# Patient Record
Sex: FEMALE | Race: WHITE | Employment: FULL TIME | URBAN - METROPOLITAN AREA
[De-identification: names, ages, dates, MRNs, and addresses within clinical notes are randomized per-mention and may not be internally consistent; named-entity substitution may affect disease eponyms.]

---

## 2015-09-17 LAB — PAP SMEAR - HIM PATIENT REPORTED: NEGATIVE

## 2017-10-10 ENCOUNTER — HOSPITAL ENCOUNTER (OUTPATIENT)
Facility: CLINIC | Age: 42
Setting detail: OBSERVATION
Discharge: HOME | End: 2017-10-11
Attending: EMERGENCY MEDICINE | Admitting: INTERNAL MEDICINE
Payer: COMMERCIAL

## 2017-10-10 DIAGNOSIS — R55 VASOVAGAL SYNCOPE: Primary | ICD-10-CM

## 2017-10-10 LAB
ALBUMIN UR-MCNC: NEGATIVE MG/DL
AMORPH CRY #/AREA URNS HPF: ABNORMAL /HPF
ANION GAP SERPL CALCULATED.3IONS-SCNC: 7 MMOL/L (ref 3–14)
APPEARANCE UR: CLEAR
BACTERIA #/AREA URNS HPF: ABNORMAL /HPF
BASOPHILS # BLD AUTO: 0 10E9/L (ref 0–0.2)
BASOPHILS NFR BLD AUTO: 0.5 %
BILIRUB UR QL STRIP: NEGATIVE
BUN SERPL-MCNC: 16 MG/DL (ref 7–30)
CALCIUM SERPL-MCNC: 8.5 MG/DL (ref 8.5–10.1)
CHLORIDE SERPL-SCNC: 104 MMOL/L (ref 94–109)
CO2 SERPL-SCNC: 27 MMOL/L (ref 20–32)
COLOR UR AUTO: YELLOW
CREAT SERPL-MCNC: 0.66 MG/DL (ref 0.52–1.04)
DIFFERENTIAL METHOD BLD: NORMAL
EOSINOPHIL # BLD AUTO: 0.1 10E9/L (ref 0–0.7)
EOSINOPHIL NFR BLD AUTO: 1.3 %
ERYTHROCYTE [DISTWIDTH] IN BLOOD BY AUTOMATED COUNT: 12.9 % (ref 10–15)
GFR SERPL CREATININE-BSD FRML MDRD: >90 ML/MIN/1.7M2
GLUCOSE SERPL-MCNC: 96 MG/DL (ref 70–99)
GLUCOSE UR STRIP-MCNC: NEGATIVE MG/DL
HCG UR QL: NEGATIVE
HCT VFR BLD AUTO: 36.5 % (ref 35–47)
HGB BLD-MCNC: 12.4 G/DL (ref 11.7–15.7)
HGB UR QL STRIP: ABNORMAL
IMM GRANULOCYTES # BLD: 0 10E9/L (ref 0–0.4)
IMM GRANULOCYTES NFR BLD: 0.2 %
INTERPRETATION ECG - MUSE: NORMAL
KETONES UR STRIP-MCNC: NEGATIVE MG/DL
LEUKOCYTE ESTERASE UR QL STRIP: ABNORMAL
LYMPHOCYTES # BLD AUTO: 2 10E9/L (ref 0.8–5.3)
LYMPHOCYTES NFR BLD AUTO: 32.8 %
MCH RBC QN AUTO: 30.5 PG (ref 26.5–33)
MCHC RBC AUTO-ENTMCNC: 34 G/DL (ref 31.5–36.5)
MCV RBC AUTO: 90 FL (ref 78–100)
MONOCYTES # BLD AUTO: 0.6 10E9/L (ref 0–1.3)
MONOCYTES NFR BLD AUTO: 9.9 %
NEUTROPHILS # BLD AUTO: 3.4 10E9/L (ref 1.6–8.3)
NEUTROPHILS NFR BLD AUTO: 55.3 %
NITRATE UR QL: NEGATIVE
NRBC # BLD AUTO: 0 10*3/UL
NRBC BLD AUTO-RTO: 0 /100
PH UR STRIP: 7.5 PH (ref 5–7)
PLATELET # BLD AUTO: 205 10E9/L (ref 150–450)
POTASSIUM SERPL-SCNC: 3.8 MMOL/L (ref 3.4–5.3)
RBC # BLD AUTO: 4.07 10E12/L (ref 3.8–5.2)
RBC #/AREA URNS AUTO: ABNORMAL /HPF
SODIUM SERPL-SCNC: 138 MMOL/L (ref 133–144)
SOURCE: ABNORMAL
SP GR UR STRIP: 1.01 (ref 1–1.03)
TROPONIN I SERPL-MCNC: <0.015 UG/L (ref 0–0.04)
UROBILINOGEN UR STRIP-ACNC: 0.2 EU/DL (ref 0.2–1)
WBC # BLD AUTO: 6.2 10E9/L (ref 4–11)
WBC #/AREA URNS AUTO: ABNORMAL /HPF

## 2017-10-10 PROCEDURE — 84484 ASSAY OF TROPONIN QUANT: CPT | Performed by: EMERGENCY MEDICINE

## 2017-10-10 PROCEDURE — 93005 ELECTROCARDIOGRAM TRACING: CPT

## 2017-10-10 PROCEDURE — 25000128 H RX IP 250 OP 636: Performed by: EMERGENCY MEDICINE

## 2017-10-10 PROCEDURE — 81001 URINALYSIS AUTO W/SCOPE: CPT | Performed by: EMERGENCY MEDICINE

## 2017-10-10 PROCEDURE — 96360 HYDRATION IV INFUSION INIT: CPT

## 2017-10-10 PROCEDURE — 99220 ZZC INITIAL OBSERVATION CARE,LEVL III: CPT | Performed by: INTERNAL MEDICINE

## 2017-10-10 PROCEDURE — G0378 HOSPITAL OBSERVATION PER HR: HCPCS

## 2017-10-10 PROCEDURE — 80048 BASIC METABOLIC PNL TOTAL CA: CPT | Performed by: EMERGENCY MEDICINE

## 2017-10-10 PROCEDURE — 25000128 H RX IP 250 OP 636: Performed by: INTERNAL MEDICINE

## 2017-10-10 PROCEDURE — 96361 HYDRATE IV INFUSION ADD-ON: CPT

## 2017-10-10 PROCEDURE — 99285 EMERGENCY DEPT VISIT HI MDM: CPT | Mod: 25

## 2017-10-10 PROCEDURE — 81025 URINE PREGNANCY TEST: CPT | Performed by: EMERGENCY MEDICINE

## 2017-10-10 PROCEDURE — 85025 COMPLETE CBC W/AUTO DIFF WBC: CPT | Performed by: EMERGENCY MEDICINE

## 2017-10-10 RX ORDER — SODIUM CHLORIDE 9 MG/ML
1000 INJECTION, SOLUTION INTRAVENOUS CONTINUOUS
Status: DISCONTINUED | OUTPATIENT
Start: 2017-10-10 | End: 2017-10-10

## 2017-10-10 RX ORDER — ACETAMINOPHEN 325 MG/1
650 TABLET ORAL EVERY 4 HOURS PRN
Status: DISCONTINUED | OUTPATIENT
Start: 2017-10-10 | End: 2017-10-11 | Stop reason: HOSPADM

## 2017-10-10 RX ORDER — IBUPROFEN 200 MG
200-400 TABLET ORAL EVERY 6 HOURS PRN
Status: DISCONTINUED | OUTPATIENT
Start: 2017-10-10 | End: 2017-10-11 | Stop reason: HOSPADM

## 2017-10-10 RX ORDER — ONDANSETRON 4 MG/1
4 TABLET, ORALLY DISINTEGRATING ORAL EVERY 6 HOURS PRN
Status: DISCONTINUED | OUTPATIENT
Start: 2017-10-10 | End: 2017-10-11 | Stop reason: HOSPADM

## 2017-10-10 RX ORDER — LIDOCAINE 40 MG/G
CREAM TOPICAL
Status: DISCONTINUED | OUTPATIENT
Start: 2017-10-10 | End: 2017-10-11 | Stop reason: HOSPADM

## 2017-10-10 RX ORDER — ONDANSETRON 2 MG/ML
4 INJECTION INTRAMUSCULAR; INTRAVENOUS EVERY 6 HOURS PRN
Status: DISCONTINUED | OUTPATIENT
Start: 2017-10-10 | End: 2017-10-11 | Stop reason: HOSPADM

## 2017-10-10 RX ORDER — NITROGLYCERIN 0.4 MG/1
0.4 TABLET SUBLINGUAL EVERY 5 MIN PRN
Status: DISCONTINUED | OUTPATIENT
Start: 2017-10-10 | End: 2017-10-11 | Stop reason: HOSPADM

## 2017-10-10 RX ORDER — SODIUM CHLORIDE 9 MG/ML
INJECTION, SOLUTION INTRAVENOUS CONTINUOUS
Status: ACTIVE | OUTPATIENT
Start: 2017-10-10 | End: 2017-10-11

## 2017-10-10 RX ORDER — ACETAMINOPHEN 650 MG/1
650 SUPPOSITORY RECTAL EVERY 4 HOURS PRN
Status: DISCONTINUED | OUTPATIENT
Start: 2017-10-10 | End: 2017-10-11 | Stop reason: HOSPADM

## 2017-10-10 RX ADMIN — SODIUM CHLORIDE 1000 ML: 9 INJECTION, SOLUTION INTRAVENOUS at 19:16

## 2017-10-10 RX ADMIN — SODIUM CHLORIDE: 9 INJECTION, SOLUTION INTRAVENOUS at 19:44

## 2017-10-10 RX ADMIN — SODIUM CHLORIDE 1000 ML: 9 INJECTION, SOLUTION INTRAVENOUS at 16:48

## 2017-10-10 ASSESSMENT — ENCOUNTER SYMPTOMS
HEADACHES: 0
WEAKNESS: 0
FEVER: 0
ABDOMINAL PAIN: 0
DIZZINESS: 0
NUMBNESS: 0
SHORTNESS OF BREATH: 0
DYSURIA: 0
COUGH: 0
DIAPHORESIS: 1
VOMITING: 0
APPETITE CHANGE: 0
DIARRHEA: 1
LIGHT-HEADEDNESS: 1
NAUSEA: 0
PALPITATIONS: 0
BACK PAIN: 0
CHILLS: 0

## 2017-10-10 NOTE — IP AVS SNAPSHOT
MRN:[id number]                      After Visit Summary   10/10/2017    Brittney Morris    MRN: [id number]           Thank you!     Thank you for choosing [facility] for your care. Our goal is always to provide you with excellent care. Hearing back from our patients is one way we can continue to improve our services. Please take a few minutes to complete the written survey that you may receive in the mail after you visit with us. Thank you!        Patient Information     Date Of Birth          1975        About your hospital stay     You were admitted on:  October 10, 2017 You last received care in the:  [facility]    You were discharged on:  October 11, 2017        Reason for your hospital stay       You were hospitalized for further evaluation and treatment of a syncopal episode (fainting spell).                  Who to Call     For medical emergencies, please call 911.  For non-urgent questions about your medical care, please call your primary care provider or clinic, [phone]          Attending Provider     Provider Specialty    Trierweiler, Chad A, MD Emergency Medicine    [place], Jj Badillo MD Internal Medicine       Primary Care Provider Office Phone # Fax #    Jordon Murcia [phone]      After Care Instructions     Activity       Your activity upon discharge: activity as tolerated            Diet       Follow this diet upon discharge: Resume home diet            Discharge Instructions       1) Follow-up with your primary care provider (newly established) in the next week to discuss hospitalization   2) Follow-up on Holter monitor results   3) No medication changes made                  Follow-up Appointments     Follow-up and recommended labs and tests        You have an appointment for Thursday October 19th at 1:30 p.m. To establish care with Jordon Murcia MD located at:    [facility] [place] [facility] [address] [place]  "[place] [address]                  Your next 10 appointments already scheduled     Oct 12, 2017  7:30 AM CDT   MA SCREENING BILATERAL W/ KELLY with [id number]   [facility] [place] ([facility])    [address], [address]  [facility] [address]   [phone]           Three-dimensional (3D) mammograms are available at [place] locations in [facility], [place], [place], [facility], [place], [place], and [place]. -Health locations include [facility] in [place]. Benefits of 3D mammograms include: - Improved rate of cancer detection - Decreases your chance of having to go back for more tests, which means fewer: - \"False-positive\" results (This means that there is an abnormal area but it isn't cancer.) - Invasive testing procedures, such as a biopsy or surgery - Can provide clearer images of the breast if you have dense breast tissue. 3D mammography is an optional exam that anyone can have with a 2D mammogram. It doesn't replace or take the place of a 2D mammogram. 2D mammograms remain an effective screening test for all women.  Not all insurance companies cover the cost of a 3D mammogram. Check with your insurance.              Pending Results     No orders found for last 3 day(s).            Statement of Approval     Ordered          10/11/17 1258  I have reviewed and agree with all the recommendations and orders detailed in this document.  EFFECTIVE NOW     Approved and electronically signed by:  Amber Fisher PA-C             Admission Information     Date & Time Provider Department Dept. Phone    10/10/2017 Jj Olvera MD [facility] [phone]      Your Vitals Were     Blood Pressure Pulse Temperature Respirations Height Weight    118/73 (BP Location: Left arm) 78 97.2  F (36.2  C) (Oral) 16 1.6 m (5' 3\") 56.7 kg (125 lb)    Pulse Oximetry BMI (Body Mass Index)                97% 22.14 kg/m2     " "     MyChart Information     [employer] lets you send messages to your doctor, view your test results, renew your prescriptions, schedule appointments and more. To sign up, go to [email or web address] . Click on \"Log in\" on the left side of the screen, which will take you to the Welcome page. Then click on \"Sign up Now\" on the right side of the page.     You will be asked to enter the access code listed below, as well as some personal information. Please follow the directions to create your username and password.     Your access code is: [email or web address]  Expires: 2018  1:17 PM     Your access code will  in 90 days. If you need help or a new code, please call your [facility] clinic or [phone].        Care EveryWhere ID     This is your Care EveryWhere ID. This could be used by other organizations to access your [facility] medical records  [id number]        Equal Access to Services     DOUG HAMPTON : Vicente stallwortho Somarcell, waaxda luiza, qaybta kaalmada adeevi, keaton conway . So [facility] [phone].    ATENCIÓN: Si ishala español, tiene a allen disposición servicios gratuitos de asistencia lingüística. Llame al [phone].    We comply with applicable federal civil rights laws and [place] laws. We do not discriminate on the basis of race, color, national origin, age, disability, sex, sexual orientation, or gender identity.               Review of your medicines      CONTINUE these medicines which have NOT CHANGED        Dose / Directions    * IBUPROFEN PO        Dose:  200-400 mg   Take 200-400 mg by mouth every 6 hours as needed for moderate pain   Refills:  0       * ibuprofen 400-800 mg tablet   Commonly known as:  ADVIL,MOTRIN   Used for:  Vacuum extractor delivery, delivered        Dose:  400-800 mg   Take 1-2 tablets (400-800 mg) by mouth every 6 hours as needed for other (cramping)   Quantity:  90 tablet   Refills:  0       SERTRALINE HCL PO        Dose:  50 mg "   Take 50 mg by mouth daily   Refills:  0       * Notice:  This list has 2 medication(s) that are the same as other medications prescribed for you. Read the directions carefully, and ask your doctor or other care provider to review them with you.             Protect others around you: Learn how to safely use, store and throw away your medicines at www.disposemymeds.org.             Medication List: This is a list of all your medications and when to take them. Check marks below indicate your daily home schedule. Keep this list as a reference.      Medications           Morning Afternoon Evening Bedtime As Needed    * IBUPROFEN PO   Take 200-400 mg by mouth every 6 hours as needed for moderate pain                                * ibuprofen 400-800 mg tablet   Commonly known as:  ADVIL,MOTRIN   Take 1-2 tablets (400-800 mg) by mouth every 6 hours as needed for other (cramping)                                SERTRALINE HCL PO   Take 50 mg by mouth daily   Last time this was given:  50 mg on 10/11/2017  9:30 AM                                * Notice:  This list has 2 medication(s) that are the same as other medications prescribed for you. Read the directions carefully, and ask your doctor or other care provider to review them with you.

## 2017-10-10 NOTE — ED NOTES
"Pt was sitting in semi-fowlers position using her cell phone and began feeling warm sensation in her chest and like she was going to pass out again. Monitor showed sinus rhythm but rate had decreased to 55. Pt laid flat and heart rate increased back to 80's. Rate fluctuated between 60 and 80 for a few minutes then. Pt reports feeling a \"mike vu\" sensation in her head. Dr. Trierweiler notified and in to see pt again as well.   "

## 2017-10-10 NOTE — IP AVS SNAPSHOT
[facility] Observ[facility]    [address] [place] [address]    Phone:  [phone]                                       After Visit Summary   10/10/2017    Brittney Morris    MRN: [id number]           After Visit Summary Signature Page     I have received my discharge instructions, and my questions have been answered. I have discussed any challenges I see with this plan with the nurse or doctor.    ..........................................................................................................................................  Patient/Patient Representative Signature      ..........................................................................................................................................  Patient Representative Print Name and Relationship to Patient    ..................................................               ................................................  Date                                            Time    ..........................................................................................................................................  Reviewed by Signature/Title    ...................................................              ..............................................  Date                                                            Time

## 2017-10-10 NOTE — ED NOTES
"[facility]  ED Nurse Handoff Report    ED Chief complaint: Loss of Consciousness (pt was sitting at work in a meeting and felt hot and flushed and like she would faint and then passed out for 10 seconds, no fall. )      ED Diagnosis:   Final diagnoses:   Vasovagal syncope       Code Status: Full Code    Allergies: No Known Allergies    Activity level - Baseline/Home:  Independent    Activity Level - Current:   Independent     Needed?: No    Isolation: No  Infection: Not Applicable    Bariatric?: No    Vital Signs:   Vitals:    10/10/17 1715 10/10/17 1730 10/10/17 1745 10/10/17 1800   BP: 124/87 124/81 120/76 129/85   Pulse:       Resp: 20 11  11   Temp:       TempSrc:       SpO2: 100% 100% 100% 100%   Weight:       Height:           Cardiac Rhythm: ,   Cardiac  Cardiac Rhythm: Normal sinus rhythm    Pain level: 0-10 Pain Scale: 0    Is this patient confused?: No    Patient Report: Initial Complaint: Syncopal episode  Focused Assessment: Pt was sitting at work in meeting and got hot and flushed and felt like she was going to pass out. Pt did pass out for about 10 seconds per coworkers. Pt came around and felt fine other than her head felt a little \"heavy.\" Pt had several sensation several times while in ED and felt warm in her chest at the same time. Noted to be bradycardic the first time it happened as nurse was present and pt was just sitting using phone.   Tests Performed: Labs, EKG  Abnormal Results: moderate leuk vasu in urine  Treatments provided: 1L NS bolus, cardiac monitoring    Family Comments: spouse present, pt has 2 young children at home    OBS brochure/video discussed/provided to patient: Yes    ED Medications:   Medications   0.9% sodium chloride BOLUS (0 mLs Intravenous Stopped 10/10/17 0226)     Followed by   0.9% sodium chloride infusion (not administered)       Drips infusing?:  No      ED NURSE PHONE NUMBER: [phone]         "

## 2017-10-10 NOTE — ED PROVIDER NOTES
History     Chief Complaint:  Loss of Consciousness     HPI   Brittney Morris is an otherwise healthy 41 year old female who presents via EMS for evaluation after a syncopal episode. The patient reports she was sitting in a meeting at work this afternoon when she started to feel light-headed and diaphoretic and then proceeded to pass out in her chair. She was out for about 10 seconds but did not fall out of her chair. Coworkers called 911 and the patient was brought to the ED by EMS for evaluation. On arrival, the patient reports she is feeling improved. She states she was feeling fairly normal throughout the day, though did have an episode of diarrhea this morning. She notes she had a month long bout of diarrhea a couple months ago for which she was evaluated by gastroenterology and told it was likely viral. The patient also reports she had an episode of right upper back pain 1 week ago that she describes as muscular pain. She also had a brief episode of midsternal chest pain along with the back pain 1 week ago. No chest pain currently. The patient denies any recent changes or anything unusual in her life recently, though she is busy at home with two little kids. She denies any chest pain or back pain with deep breathing. She denies any recent exertional symptoms and is an active person. The patient ate lunch today. She denies any leg pain or swelling, fevers, abdominal pain, palpitations, vomiting, dysuria, headache, or recent travel or antibiotic use. No history of epilepsy. The patient reports she passed out once as a child and once during a D&C, but has never passed out like this while she was at rest. Her last period was last week and was normal and on time.     CARDIAC RISK FACTORS:  Sex:    Female  Tobacco:   No  Hypertension:   No  Hyperlipidemia:  No  Diabetes:   No  Family History:  No    PE/DVT RISK FACTORS:  Sex:    Female  Hormones:   No  Tobacco:   No  Cancer:   No  Travel:   No  Surgery:  "  No  Other immobilization: No  Personal history:  No  Family history:  No    Allergies:  No Known Allergies     Medications:    Seroquel     Past Medical History:    The patient does not have any past pertinent medical history.    Past Surgical History:    D&C    Family History:    Seizures  Atrial fibrillation  Coronary artery disease, father age 74    Social History:  Smoking status: No  Alcohol use: No  Marital Status:   [2]     Review of Systems   Constitutional: Positive for diaphoresis. Negative for appetite change, chills and fever.   Eyes: Negative for visual disturbance.   Respiratory: Negative for cough and shortness of breath.    Cardiovascular: Positive for chest pain (none currently ). Negative for palpitations and leg swelling.   Gastrointestinal: Positive for diarrhea. Negative for abdominal pain, nausea and vomiting.   Genitourinary: Negative for dysuria.   Musculoskeletal: Negative for back pain.   Skin: Negative for rash.   Neurological: Positive for syncope and light-headedness. Negative for dizziness, weakness, numbness and headaches.   All other systems reviewed and are negative.      Physical Exam   Patient Vitals for the past 24 hrs:   BP Temp Temp src Pulse Heart Rate Resp SpO2 Height Weight   10/10/17 1815 (!) 140/92 - - - 74 13 99 % - -   10/10/17 1800 129/85 - - - 59 11 100 % - -   10/10/17 1730 124/81 - - - 60 11 100 % - -   10/10/17 1715 124/87 - - - 68 20 100 % - -   10/10/17 1700 122/70 - - - 67 12 100 % - -   10/10/17 1630 140/89 - - - 67 16 100 % - -   10/10/17 1627 114/82 - - - - - 99 % - -   10/10/17 1615 (!) 134/91 - - - 90 - 100 % - -   10/10/17 1612 132/88 98.1  F (36.7  C) Oral 78 - 16 100 % 1.6 m (5' 3\") 56.7 kg (125 lb)     Lying Orthostatic BP - Lying Orthostatic BP: 140/89 ; Lying Orthostatic Pulse: 65 bpm   Sitting Orthostatic BP - Sitting Orthostatic BP: 126/104 ; Sitting Orthostatic Pulse: 72 bpm   Standing Orthostatic BP - Standing Orthostatic BP: 137/88 ; " Standing Orthostatic Pulse: 75 bpm    Physical Exam  Eye:  Pupils are equal, round, and reactive.  Extraocular movements intact.    ENT:  No rhinorrhea.  Moist mucus membranes.  Normal tongue and tonsil.    Cardiac:  Regular rate and rhythm.  No murmurs, gallops, or rubs.    Pulmonary:  Clear to auscultation bilaterally.  No wheezes, rales, or rhonchi.    Abdomen:  Positive bowel sounds.  Abdomen is soft and non-distended, without focal tenderness.    Musculoskeletal:  Normal movement of all extremities without evidence for deficit. No lower extremity edema or asymmetry.     Skin:  Warm and dry without rashes.    Neurologic:  Non-focal exam without asymmetric weakness or numbness.     Psychiatric:  Normal affect with appropriate interaction with examiner.    Emergency Department Course   ECG (16:18:12):  Rate 82 bpm. ND interval 110. QRS duration 80. QT/QTc 394/460. P-R-T axes 55 45 20. Sinus rhythm with short ND. Otherwise normal ECG   Interpreted at 1620 by Trierweiler, Chad A, MD.    Laboratory:  Troponin: <0.015  CBC: WNL (WBC 6.2, HGB 12.4, )   BMP: WNL (Creatinine 0.66)  HCG qual: Negative  UA: Blood small, pH 7.5(H), Leukocyte esterase moderate, WBC 2-5, Bacteria few, Amorphous crystal few, o/w negative.     Interventions:  NS 1L IV Bolus    Emergency Department Course:  The patient arrived in the emergency department via EMS.  Past medical records, nursing notes, and vitals reviewed.  1613: I performed an exam of the patient and obtained history, as documented above.  IV inserted and blood drawn. The patient was placed on continuous cardiac monitoring and pulse oximetry.  ECG obtained, results above.     1630: Patient was sitting in semi-fowlers position using her cell phone and began feeling warm sensation in her chest and like she was going to pass out again. Monitor showed sinus rhythm but rate had decreased to 55. Pt laid flat and heart rate increased back to 80's. Rate fluctuated between 60 and  "80 for a few minutes then. Pt reports feeling a \"mike vu\" sensation in her head.  Repeat blood pressure 118/60. I rechecked the patient at this time.     Orthostatics as above.     1803: I rechecked the patient. Explained findings to the patient.    1832: I spoke to Dr. Olvera of the hospitalist service who accepts the patient for admission.     Findings and plan explained to the Patient who consents to admission.   Discussed the patient with Dr. Olvera, who will admit the patient to an obs tele bed for further monitoring, evaluation, and treatment.     Impression & Plan      Medical Decision Making:  This delightful 41-year-old without cardiac or pulmonary embolism risk factors presents to us because of a syncopal spell at work today.  She denies having a significant past history of lightheadedness or syncope.  She denies any dehydration.  She knew that she was becoming lightheaded, describing nausea, tightness in her chest, and diaphoresis followed by a witnessed 10 2nd syncopal spell.  There was no trauma.    On my assessment here, the patient appears completely well.  Her EKG was unremarkable.  However, shortly after my initial assessment, she complained of having another spell where she thought she might pass out.  Nursing observed on the monitor that her heart rate had dropped into the low 50s, quickly returning back to its baseline of 70s shortly thereafter with resolution of the patient's symptoms.  This occurred with her simply lying on the bed.  Orthostatic vital signs were taken and were unremarkable.  The patient was given a bolus of fluid.  While I was waiting for her laboratory investigation to return, she had 2 further short spells of similar symptoms with associated bradycardia.  This is very unusual, of unclear cause as to whether this is a tachycardia/bradycardia issue versus autonomic dysfunction.  Nonetheless, with her suffering for spells within a period of 2 hours, I feel it is prudent to admit " her to the hospital for overnight telemetry monitoring, echocardiogram, and assessment by cardiology.  I spoke with Dr. Olvera of the hospitalist service who agrees to admit the patient to the telemetry observation unit.    Diagnosis:    ICD-10-CM   1. Vasovagal syncope R55     Disposition: Admitted    Dotty Reddy  10/10/2017    EMERGENCY DEPARTMENT    I, Dotty Reddy, am serving as a scribe at 4:13 PM on 10/10/2017 to document services personally performed by Trierweiler, Chad A, MD based on my observations and the provider's statements to me.        Trierweiler, Chad A, MD  10/10/17 2032

## 2017-10-11 ENCOUNTER — APPOINTMENT (OUTPATIENT)
Dept: CARDIOLOGY | Facility: CLINIC | Age: 42
End: 2017-10-11
Attending: INTERNAL MEDICINE
Payer: COMMERCIAL

## 2017-10-11 VITALS
OXYGEN SATURATION: 97 % | WEIGHT: 125 LBS | RESPIRATION RATE: 16 BRPM | DIASTOLIC BLOOD PRESSURE: 73 MMHG | HEART RATE: 78 BPM | SYSTOLIC BLOOD PRESSURE: 118 MMHG | TEMPERATURE: 97.2 F | BODY MASS INDEX: 22.15 KG/M2 | HEIGHT: 63 IN

## 2017-10-11 LAB
ANION GAP SERPL CALCULATED.3IONS-SCNC: 7 MMOL/L (ref 3–14)
BASOPHILS # BLD AUTO: 0 10E9/L (ref 0–0.2)
BASOPHILS NFR BLD AUTO: 0.2 %
BUN SERPL-MCNC: 9 MG/DL (ref 7–30)
CALCIUM SERPL-MCNC: 7.9 MG/DL (ref 8.5–10.1)
CHLORIDE SERPL-SCNC: 111 MMOL/L (ref 94–109)
CO2 SERPL-SCNC: 25 MMOL/L (ref 20–32)
CREAT SERPL-MCNC: 0.62 MG/DL (ref 0.52–1.04)
DIFFERENTIAL METHOD BLD: NORMAL
EOSINOPHIL # BLD AUTO: 0.1 10E9/L (ref 0–0.7)
EOSINOPHIL NFR BLD AUTO: 0.9 %
ERYTHROCYTE [DISTWIDTH] IN BLOOD BY AUTOMATED COUNT: 13 % (ref 10–15)
GFR SERPL CREATININE-BSD FRML MDRD: >90 ML/MIN/1.7M2
GLUCOSE SERPL-MCNC: 87 MG/DL (ref 70–99)
HCT VFR BLD AUTO: 36.9 % (ref 35–47)
HGB BLD-MCNC: 12.3 G/DL (ref 11.7–15.7)
IMM GRANULOCYTES # BLD: 0 10E9/L (ref 0–0.4)
IMM GRANULOCYTES NFR BLD: 0 %
LYMPHOCYTES # BLD AUTO: 1.7 10E9/L (ref 0.8–5.3)
LYMPHOCYTES NFR BLD AUTO: 32.6 %
MCH RBC QN AUTO: 30.4 PG (ref 26.5–33)
MCHC RBC AUTO-ENTMCNC: 33.3 G/DL (ref 31.5–36.5)
MCV RBC AUTO: 91 FL (ref 78–100)
MONOCYTES # BLD AUTO: 0.5 10E9/L (ref 0–1.3)
MONOCYTES NFR BLD AUTO: 9.9 %
NEUTROPHILS # BLD AUTO: 3 10E9/L (ref 1.6–8.3)
NEUTROPHILS NFR BLD AUTO: 56.4 %
NRBC # BLD AUTO: 0 10*3/UL
NRBC BLD AUTO-RTO: 0 /100
PLATELET # BLD AUTO: 198 10E9/L (ref 150–450)
POTASSIUM SERPL-SCNC: 3.6 MMOL/L (ref 3.4–5.3)
RBC # BLD AUTO: 4.05 10E12/L (ref 3.8–5.2)
SODIUM SERPL-SCNC: 143 MMOL/L (ref 133–144)
TROPONIN I SERPL-MCNC: <0.015 UG/L (ref 0–0.04)
TSH SERPL DL<=0.005 MIU/L-ACNC: 3.46 MU/L (ref 0.4–4)
WBC # BLD AUTO: 5.3 10E9/L (ref 4–11)

## 2017-10-11 PROCEDURE — 93306 TTE W/DOPPLER COMPLETE: CPT | Mod: 26 | Performed by: INTERNAL MEDICINE

## 2017-10-11 PROCEDURE — 93270 REMOTE 30 DAY ECG REV/REPORT: CPT | Performed by: PHYSICIAN ASSISTANT

## 2017-10-11 PROCEDURE — 84484 ASSAY OF TROPONIN QUANT: CPT | Performed by: PHYSICIAN ASSISTANT

## 2017-10-11 PROCEDURE — 36415 COLL VENOUS BLD VENIPUNCTURE: CPT | Performed by: PHYSICIAN ASSISTANT

## 2017-10-11 PROCEDURE — 85025 COMPLETE CBC W/AUTO DIFF WBC: CPT | Performed by: PHYSICIAN ASSISTANT

## 2017-10-11 PROCEDURE — 84443 ASSAY THYROID STIM HORMONE: CPT | Performed by: PHYSICIAN ASSISTANT

## 2017-10-11 PROCEDURE — G0378 HOSPITAL OBSERVATION PER HR: HCPCS

## 2017-10-11 PROCEDURE — 25000132 ZZH RX MED GY IP 250 OP 250 PS 637: Performed by: INTERNAL MEDICINE

## 2017-10-11 PROCEDURE — 93306 TTE W/DOPPLER COMPLETE: CPT

## 2017-10-11 PROCEDURE — 99217 ZZC OBSERVATION CARE DISCHARGE: CPT | Performed by: PHYSICIAN ASSISTANT

## 2017-10-11 PROCEDURE — 80048 BASIC METABOLIC PNL TOTAL CA: CPT | Performed by: PHYSICIAN ASSISTANT

## 2017-10-11 PROCEDURE — 96361 HYDRATE IV INFUSION ADD-ON: CPT

## 2017-10-11 RX ADMIN — SERTRALINE HYDROCHLORIDE 50 MG: 50 TABLET ORAL at 09:30

## 2017-10-11 NOTE — PROGRESS NOTES
Patient discharged to home by wheelchair at 3:54 PM 10/11/17.  Medication regimen and new medications discussed with patient and patient verbalizes understanding. Diet and activity and  discussed with patient. Upcoming appointments reviewed. Education on event monitor provided. No questions at this time.

## 2017-10-11 NOTE — PLAN OF CARE
Problem: Patient Care Overview  Goal: Plan of Care/Patient Progress Review  Outcome: Improving  List all  goals to be met before discharge:   - Diagnostic tests and consults completed and resulted - not met   - No further episodes of syncope and any new arrhythmia addressed with controlled heart rates working on goal - met   - Vital signs normal or at patient baseline - met    - orthostatic vitals are normal - met    -  patient not lightheaded with standing - not met   - Tolerating oral intake to maintain hydration - met     A&Ox4, VSS on RA. Denies pain. C/o dizziness when ambulating, has not gotten out of bed majority of shift. Combo reg diet no caffeine, voiding adequately. IV SL. Up SBA. Tele NSR. Plan for echocardiogram in AM. Will continue to monitor.

## 2017-10-11 NOTE — PLAN OF CARE
Problem: Patient Care Overview  Goal: Plan of Care/Patient Progress Review  Outcome: Improving  List all  goals to be met before discharge:   - Diagnostic tests and consults completed and resulted - not met   - No further episodes of syncope and any new arrhythmia addressed with controlled heart rates working on goal - met   - Vital signs normal or at patient baseline - met    - orthostatic vitals are normal - met    -  patient not lightheaded with standing - not met   - Tolerating oral intake to maintain hydration - met

## 2017-10-11 NOTE — PHARMACY-ADMISSION MEDICATION HISTORY
Admission medication history interview status for the 10/10/2017  admission is complete. See EPIC admission navigator for prior to admission medications     Medication history source reliability:Good    Actions taken by pharmacist (provider contacted, etc): Called [facility] for sertraline strength      Additional medication history information not noted on PTA med list :None    Medication reconciliation/reorder completed by provider prior to medication history? No    Time spent in this activity: 7 min    Prior to Admission medications    Medication Sig Last Dose Taking? Auth Provider   IBUPROFEN PO Take 200-400 mg by mouth every 6 hours as needed for moderate pain prn Yes Unknown, Entered By History   SERTRALINE HCL PO Take 50 mg by mouth daily 10/10/2017 at Unknown time Yes Unknown, Entered By History   ibuprofen (ADVIL,MOTRIN) 400-800 mg tablet Take 1-2 tablets (400-800 mg) by mouth every 6 hours as needed for other (cramping) prn Yes Shana Walton MD

## 2017-10-11 NOTE — PLAN OF CARE
"Problem: Patient Care Overview  Goal: Plan of Care/Patient Progress Review  Outcome: No Change  List all  goals to be met before discharge:   - Diagnostic tests and consults completed and resulted Not Met  - No further episodes of syncope and any new arrhythmia addressed with controlled heart rates working on gaol   - Vital signs normal or at patient baseline goal met     orthostatic vitals are normal goal met      patient not lightheaded with standing goal not met  - Tolerating oral intake to maintain hydration goal met     Pt. Became lightheaded in bed and felt like she was going to \"pass out\". Subsided quickly      "

## 2017-10-11 NOTE — H&P
PRIMARY CARE PHYSICIAN:  Shana Walton MD.      CODE STATUS:  Full code.      CHIEF COMPLAINT:  Syncopal episode.      HISTORY OF PRESENT ILLNESS:  Brittney Morris is a very pleasant 41-year-old female.  She has been pretty healthy, not had too many problems.  She was at work today sitting in a meeting, noted she was feeling lightheaded and diaphoretic and was noted by co-workers to have passed out for roughly 10 seconds.  They called EMS to bring her in for further evaluation.  The patient only notes having bouts of diarrhea for a few months and she did actually have 1 isolated episode of diarrhea this morning.  She has had the diarrhea worked up in the past and told that it was nothing significant and it does not seem to have persisted.  She has not had any more diarrhea today.  She has not had any vomiting.  She says that she has had syncopal episodes twice before in her life, but one time was as a small child and the other time was believed to be secondary to pain during a procedure.  No seizure-like activity such as tremors or losing control of bowel and bladder was noted during the episode.  She is denying any chest pain or shortness of breath, although, she says that about 1 week ago, she had a very short-lived period of pain under her sternum, described it as sharp and nonradiating.  She occasionally has some back pain, most recent right upper back pain she attributed to carrying a small child around, with the occasional sciatic type of nerve pain down into her right leg and she takes NSAIDs for this on a p.r.n. basis.  Workup so far looks fairly benign.  Troponin negative.  Metabolic panel and CBC negative.  Urinalysis with a few white blood cells and a small amount of leukocyte esterase but she is not complaining of any symptoms a UTI such polyuria or dysuria, no fever or chills.  Her EKG also looked pretty normal.  The ED provider was going to send the patient home from ED, but he noted a fluctuation  in heart rate seeing that she went from about 80, dropped down to 60 with in a few seconds.  They were unable to really capture this rhythm either they were asking if we would bring the patient in observation tonight to keep on the monitor and a workup for any other causes of syncope.      ALLERGIES:  No known drug allergies.      HOME MEDICATIONS:   1.  Sertraline 50 mg a day.   2.  Ibuprofen 200-400 mg q. 6 hours p.r.n. for back pain.      PAST MEDICAL HISTORY:   1.  UTIs.   2.  Seizure as a child in  only took medications for 2 years and has been for off them ever since.   3.  Missed    4.  Anxiety.      PAST SURGICAL HISTORY:  [place] teeth extraction and she had a dilatation and curettage, suction in 2012.      FAMILY HISTORY:  Father had atrial fibrillation and an MI at the age of 87.  He is ; no other contributory family history.      SOCIAL HISTORY:  She has never used tobacco.  Does not drink.  No illicit drug history.  She works for [employer].  She is .  She and her  live independently.      REVIEW OF SYSTEMS:  A 10-system review conducted with patient and other than those systems listed in history present illness are negative.      PHYSICAL EXAMINATION:   VITAL SIGNS:  Blood pressure lying down 140/89, sitting up 126/104, standing 137/88, heart rate lying down 65, sitting 72, standing 75, temperature is 98.1 oral, O2 sats 100% on room air, respiratory rate 12.   GENERAL:  Well-nourished appearing middle-aged female in no apparent distress, alert and oriented x4, afebrile.   HEENT:  Normocephalic, atraumatic.  No oropharyngeal erythema.   NECK:  No cervical lymphadenopathy, no thyromegaly.   RESPIRATORY:  Lungs clear to auscultation bilaterally.  Normal work of breathing.  No wheezes, rales or rhonchi.   CARDIOVASCULAR:  Normal S1, S2, no S3, S4, regular rhythm, no murmurs, rubs or gallops, 2+ pulses palpable in all 4 extremities.   ABDOMEN:  Normal bowel sounds.  Abdomen  is soft, nontender, nondistended.  No hepatosplenomegaly or mass palpable.   EXTREMITIES:  No clubbing, cyanosis or edema.   NEUROLOGIC:  Cranial nerves II-XII are intact, 5/5 strength in 4 extremities, sensation intact diffusely, normal coordination by bilateral finger-nose testing.      LABORATORY DATA:  Complete metabolic profile, all values are within normal limits.  Troponin less than 0.015.  Urine hCG negative.  CBC all values are normal.  Urinalysis shows moderate amount of leukocyte esterase, 2-5 white blood cells, otherwise normal.  EKG shows sinus rhythm, short ME, the interval is 110.  QTc is 460.      IMAGING STUDIES:  None wereconducted.      ASSESSMENT:  This is a 41-year-old female with past medical history of anxiety, a very remote history of childhood seizures but has been off medications for decades and had no recurrence.  She is being admitted tonight for a 10 second syncopal episode at work and she had some fluctuation in heart rate in the Emergency Department being admitted for observation and further workup.      PLAN:   1.  Syncopal episode:  I do not think it is unreasonable to keep the patient tonight on cardiac monitoring.  Will monitor for any arrhythmia issues, fluctuations in heart rate or abnormal rhythm such as atrial fibrillation, atrial flutter, SVTs, etc.  The other thing will do in the morning is get an echo to evaluate for any valve issues.  On physical exam, I do not get a sense of a valve problem when I auscultate the heart and normally I would not get an echo on a syncopal patient, but if she is having some fluctuations in heart rate this is something we should workup.  I suspect this might all just be attributed to orthostatic hypotension from hypovolemia.  She did have some diarrhea this morning.  Her orthostatic blood pressures did not seem to do what one would expect on exam and her heart rate did have the normal compensatory response, but I think this is after a liter of  fluids.  I will give her an additional liter, but I will do it slowly over 10 hours;  I will give 100 mL an hour and then stop fluids.  I think if the echo is normal and there is no evidence on monitor tonight, she will probably be adequate to go home tomorrow.   2.  History of anxiety:  She is dealing with this really well.  She is in a very good mood tonight.  I will continue her home medication, which is sertraline, Ativan can be made available if necessary on a p.r.n. basis, but I will not order it right now.   3.  History of back pain:  Not really bothering her at this moment.  I have made p.r.n. ibuprofen available to per home regimen.   4.  Deep venous thrombosis prophylaxis:  Anticipate a short stay, would encourage ambulation.      CODE STATUS:  Patient prefers FULL CODE.      DISPOSITION:  I anticipate that the patient will be able to discharge in 12-24 hours if all cardiac testing is normal and she is adequately rehydrated.         RUSTY GRAY MD             D: 10/10/2017 19:16   T: 10/10/2017 20:24   MT:       Name:     TED MONROY   MRN:      [id number]        Account:      [id number]   :      1975           Admitted:     [id number]      Document: [id number]       cc: Shana Walton MD

## 2017-10-11 NOTE — DISCHARGE SUMMARY
"[facility]    Discharge Summary  Hospitalist    Date of Admission:  10/10/2017  Date of Discharge:  10/11/2017  Discharging Provider: Amber Fisher PA-C  Date of Service (when I saw the patient): 10/11/17    Discharge Diagnoses   Vasovagal syncope    History of Present Illness   Brittney Morris is an 41 year old female with past medical history of anxiety and a very remote history of childhood seizures (off medications for decades, no recurrence) who was admitted on 10/10/17 under observation status after a syncopal event at work. See H&P by Dr. Olvera from 10/10/17 for complete details on presentation.     Pt is without recurrence of events since admission. Had a  Couple bouts of dizziness last evening, but no syncopal events. Afebrile. Testing as outlined below negative. Able to ambulate independently around the room without recurrence of symptoms.     Hospital Course   Brittney Morris was admitted on 10/10/2017.  The following problems were addressed during her hospitalization:    Syncopal episode, likely vasovagal: Event occurred while patient was sitting during a work meeting. Describes becoming lightheaded and diaphoretic, reportedly \"slumped over\" by coworkers and awoke 10 seconds later on the ground. CBC, BMP, HGB, UA, TSH WNL. EKG showing NSR without overt ST depression, elevation, or T wave abnormalities. Orthostatics negative (although borderline). Tele without overt abnormality since admission. Echocardiogram with preserved EF, no WMA or valvular disease noted. Hydrated with IVF overnight. Etiology likely vasovagal vs orthostatic vs ? Wells score 0 making pretest probability for DVT and thus, PE, low.   -- Given above negative work-up, will discharge patient with 30 day cardiac event monitor; results to be followed up with PCP   -- Care Coordinator helped pt establish with PCP; follow-up recommended in the next week  -- No new medications at discharge   -- Adequate oral " fluid intake encouraged     Anxiety:  Continue PTA Sertraline      Amber Fisher PA-C    This patient was discussed with Dr. Bergeron of the Hospitalist Service who agrees with current plans as outlined above.     Significant Results and Procedures   See below     Pending Results   These results will be followed up by PCP  Unresulted Labs Ordered in the Past 30 Days of this Admission     Date and Time Order Name Status Description    10/11/2017 0830 TSH with free T4 reflex In process           Code Status   Full Code       Primary Care Physician   Shana Walton MD    Physical Exam   Temp: 97.2  F (36.2  C) Temp src: Oral BP: 118/73 Pulse: 78 Heart Rate: 72 Resp: 16 SpO2: 97 % O2 Device: None (Room air)    Vitals:    10/10/17 1612   Weight: 56.7 kg (125 lb)     Vital Signs with Ranges  Temp:  [97.2  F (36.2  C)-98.2  F (36.8  C)] 97.2  F (36.2  C)  Pulse:  [78] 78  Heart Rate:  [59-90] 72  Resp:  [11-20] 16  BP: (102-140)/(66-92) 118/73  SpO2:  [96 %-100 %] 97 %  I/O last 3 completed shifts:  In: 3 [I.V.:3]  Out: -     CONSTITUTIONAL: Pt laying in bed, dressed in hospital garb. Appears comfortable. Cooperative with interview. Accompanied by  and son at bedside.   HEENT: Normocephalic, atraumatic. Negative for conjunctival redness or scleral icterus.   CARDIOVASCULAR: RRR, no murmurs, rubs, or extra heart sounds appreciated. Pulses +2/4 and regular in upper and lower extremities, bilaterally.   RESPIRATORY: No increased work of breathing.  CTA, bilat; no wheezes, rales, or rhonchi appreciated.  GASTROINTESTINAL:  Abdomen soft, non-distended. BS auscultated in all four quadrants. Negative for tenderness to palpation.  No masses or organomegaly noted.  MUSCULOSKELETAL: No gross deformities noted. Normal muscle tone.   HEMATOLOGIC/LYMPHATIC/IMMUNOLOGIC: Negative for lower extremity edema, bilaterally.  NEUROLOGIC: Alert and oriented to person, place, and time.  strength intact. No focal neuro  deficits appreciated   SKIN: Warm, dry, intact. No jaundice noted. Negative for suspicious lesions, rashes, bruising, open sores or abrasions.     Discharge Disposition   Discharged to home  Condition at discharge: Stable    Consultations This Hospital Stay   CARE COORDINATOR IP CONSULT    Time Spent on this Encounter   I, Amber Simajoshua Fisher, personally saw the patient today and spent greater than 30 minutes discharging this patient.    Discharge Orders     Reason for your hospital stay   You were hospitalized for further evaluation and treatment of a syncopal episode (fainting spell).     Follow-up and recommended labs and tests    Follow up with primary care provider, Shana Walton MD, within 7 days for hospital follow- up.  No follow up labs or test are needed.     Activity   Your activity upon discharge: activity as tolerated     Discharge Instructions   1) Follow-up with your primary care provider (newly established) in the next week to discuss hospitalization   2) Follow-up on Holter monitor results   3) No medication changes made     Full Code     Diet   Follow this diet upon discharge: Resume home diet       Discharge Medications   Current Discharge Medication List      CONTINUE these medications which have NOT CHANGED    Details   !! IBUPROFEN PO Take 200-400 mg by mouth every 6 hours as needed for moderate pain      SERTRALINE HCL PO Take 50 mg by mouth daily      !! ibuprofen (ADVIL,MOTRIN) 400-800 mg tablet Take 1-2 tablets (400-800 mg) by mouth every 6 hours as needed for other (cramping)  Qty: 90 tablet, Refills: 0    Associated Diagnoses: Vacuum extractor delivery, delivered       !! - Potential duplicate medications found. Please discuss with provider.        Allergies   No Known Allergies  Data   Most Recent 3 CBC's:  Recent Labs   Lab Test  10/11/17   0830  10/10/17   1615  03/30/16   0828   WBC  5.3  6.2   --    HGB  12.3  12.4  11.5*   MCV  91  90   --    PLT  198  205   --       Most  Recent 3 BMP's:  Recent Labs   Lab Test  10/11/17   0830  10/10/17   1615   NA  143  138   POTASSIUM  3.6  3.8   CHLORIDE  111*  104   CO2  25  27   BUN  9  16   CR  0.62  0.66   ANIONGAP  7  7   CESAR  7.9*  8.5   GLC  87  96     Most Recent 2 LFT's:No lab results found.  Most Recent INR's and Anticoagulation Dosing History:  Anticoagulation Dose History     There is no flowsheet data to display.        Most Recent 3 Troponin's:  Recent Labs   Lab Test  10/11/17   0830  10/10/17   1615   TROPI  <0.015  <0.015     Most Recent Cholesterol Panel:No lab results found.  Most Recent 6 Bacteria Isolates From Any Culture (See EPIC Reports for Culture Details):No lab results found.  Most Recent TSH, T4 and A1c Labs:No lab results found.  Results for orders placed or performed during the hospital encounter of 10/11/16   MA Screen Bilateral w/Mike    Narrative    SCREENING MAMMOGRAM, BILATERAL, DIGITAL w/CAD and TOMOSYNTHESIS,  10/11/2016 8:03 AM    BREAST DENSITY: Scattered fibroglandular densities.    CLINICAL INFORMATION:  Encounter for screening mammogram for malignant  neoplasm of breast, 6/2/2015     FINDINGS: Negative. Stable exam. Screening exam in one year  recommended.      Impression    IMPRESSION: BI-RADS CATEGORY: 1 -  Negative.    RECOMMENDED FOLLOW-UP: Annual Mammography.      MARIEL ZACARIAS MD

## 2017-10-11 NOTE — PROGRESS NOTES
Met with the patient at the bedside regarding discharge planning.  Patient is requesting new PCP called and scheduled the patient per preference for [facility] in [place]. Hand off to PCP at fax [phone].  Called EKG for event monitor they will come to see the patient around ~14:30. Updated Charge RN.

## 2017-10-11 NOTE — PLAN OF CARE
Problem: Patient Care Overview  Goal: Plan of Care/Patient Progress Review  Outcome: Improving  List all  goals to be met before discharge:   - Diagnostic tests and consults completed and resulted - not met   - No further episodes of syncope and any new arrhythmia addressed with controlled heart rates working on goal - met   - Vital signs normal or at patient baseline - met    - orthostatic vitals are normal - met    -  patient not lightheaded with standing -  met   - Tolerating oral intake to maintain hydration - met     A&Ox4, VSS on RA. Denies pain. Denied dizziness,lightheadedness. Combo reg diet no caffeine, voiding adequately. IV SL. Up SBA. Tele NSR. Plan for echocardiogram in AM. Will continue to monitor.

## 2017-10-12 ENCOUNTER — HOSPITAL ENCOUNTER (OUTPATIENT)
Dept: MAMMOGRAPHY | Facility: CLINIC | Age: 42
Discharge: HOME | End: 2017-10-12
Attending: OBSTETRICS & GYNECOLOGY | Admitting: OBSTETRICS & GYNECOLOGY
Payer: COMMERCIAL

## 2017-10-12 DIAGNOSIS — Z12.31 VISIT FOR SCREENING MAMMOGRAM: ICD-10-CM

## 2017-10-12 PROCEDURE — G0202 SCR MAMMO BI INCL CAD: HCPCS

## 2018-05-11 ENCOUNTER — HOSPITAL ENCOUNTER (EMERGENCY)
Facility: CLINIC | Age: 43
Discharge: HOME | End: 2018-05-11
Attending: EMERGENCY MEDICINE | Admitting: EMERGENCY MEDICINE
Payer: COMMERCIAL

## 2018-05-11 VITALS
HEIGHT: 63 IN | DIASTOLIC BLOOD PRESSURE: 82 MMHG | RESPIRATION RATE: 16 BRPM | OXYGEN SATURATION: 99 % | TEMPERATURE: 98.2 F | SYSTOLIC BLOOD PRESSURE: 148 MMHG | BODY MASS INDEX: 22.68 KG/M2 | HEART RATE: 71 BPM | WEIGHT: 128 LBS

## 2018-05-11 DIAGNOSIS — R20.9 ALTERATION IN SENSORY PERCEPTION: ICD-10-CM

## 2018-05-11 DIAGNOSIS — R51.9 NONINTRACTABLE EPISODIC HEADACHE, UNSPECIFIED HEADACHE TYPE: ICD-10-CM

## 2018-05-11 LAB
ANION GAP SERPL CALCULATED.3IONS-SCNC: 8 MMOL/L (ref 3–14)
BASOPHILS # BLD AUTO: 0 10E9/L (ref 0–0.2)
BASOPHILS NFR BLD AUTO: 0.1 %
BUN SERPL-MCNC: 15 MG/DL (ref 7–30)
CALCIUM SERPL-MCNC: 8.2 MG/DL (ref 8.5–10.1)
CHLORIDE SERPL-SCNC: 104 MMOL/L (ref 94–109)
CO2 SERPL-SCNC: 24 MMOL/L (ref 20–32)
CREAT SERPL-MCNC: 0.63 MG/DL (ref 0.52–1.04)
DIFFERENTIAL METHOD BLD: NORMAL
EOSINOPHIL # BLD AUTO: 0 10E9/L (ref 0–0.7)
EOSINOPHIL NFR BLD AUTO: 0.5 %
ERYTHROCYTE [DISTWIDTH] IN BLOOD BY AUTOMATED COUNT: 12.5 % (ref 10–15)
GFR SERPL CREATININE-BSD FRML MDRD: >90 ML/MIN/1.7M2
GLUCOSE SERPL-MCNC: 98 MG/DL (ref 70–99)
HCT VFR BLD AUTO: 38 % (ref 35–47)
HGB BLD-MCNC: 12.6 G/DL (ref 11.7–15.7)
IMM GRANULOCYTES # BLD: 0 10E9/L (ref 0–0.4)
IMM GRANULOCYTES NFR BLD: 0.2 %
INTERPRETATION ECG - MUSE: NORMAL
LYMPHOCYTES # BLD AUTO: 1 10E9/L (ref 0.8–5.3)
LYMPHOCYTES NFR BLD AUTO: 12.3 %
MCH RBC QN AUTO: 29.6 PG (ref 26.5–33)
MCHC RBC AUTO-ENTMCNC: 33.2 G/DL (ref 31.5–36.5)
MCV RBC AUTO: 89 FL (ref 78–100)
MONOCYTES # BLD AUTO: 0.5 10E9/L (ref 0–1.3)
MONOCYTES NFR BLD AUTO: 6.5 %
NEUTROPHILS # BLD AUTO: 6.5 10E9/L (ref 1.6–8.3)
NEUTROPHILS NFR BLD AUTO: 80.4 %
NRBC # BLD AUTO: 0 10*3/UL
NRBC BLD AUTO-RTO: 0 /100
PLATELET # BLD AUTO: 189 10E9/L (ref 150–450)
POTASSIUM SERPL-SCNC: 4 MMOL/L (ref 3.4–5.3)
RBC # BLD AUTO: 4.25 10E12/L (ref 3.8–5.2)
SODIUM SERPL-SCNC: 136 MMOL/L (ref 133–144)
WBC # BLD AUTO: 8.1 10E9/L (ref 4–11)

## 2018-05-11 PROCEDURE — 96374 THER/PROPH/DIAG INJ IV PUSH: CPT

## 2018-05-11 PROCEDURE — 25000132 ZZH RX MED GY IP 250 OP 250 PS 637: Performed by: EMERGENCY MEDICINE

## 2018-05-11 PROCEDURE — 25000128 H RX IP 250 OP 636: Performed by: EMERGENCY MEDICINE

## 2018-05-11 PROCEDURE — 85025 COMPLETE CBC W/AUTO DIFF WBC: CPT | Performed by: EMERGENCY MEDICINE

## 2018-05-11 PROCEDURE — 93005 ELECTROCARDIOGRAM TRACING: CPT

## 2018-05-11 PROCEDURE — 80048 BASIC METABOLIC PNL TOTAL CA: CPT | Performed by: EMERGENCY MEDICINE

## 2018-05-11 PROCEDURE — 99284 EMERGENCY DEPT VISIT MOD MDM: CPT | Mod: 25

## 2018-05-11 RX ORDER — METOCLOPRAMIDE HYDROCHLORIDE 5 MG/ML
10 INJECTION INTRAMUSCULAR; INTRAVENOUS ONCE
Status: COMPLETED | OUTPATIENT
Start: 2018-05-11 | End: 2018-05-11

## 2018-05-11 RX ORDER — ACETAMINOPHEN 500 MG
500 TABLET ORAL ONCE
Status: COMPLETED | OUTPATIENT
Start: 2018-05-11 | End: 2018-05-11

## 2018-05-11 RX ADMIN — METOCLOPRAMIDE HYDROCHLORIDE 10 MG: 5 INJECTION INTRAMUSCULAR; INTRAVENOUS at 18:24

## 2018-05-11 RX ADMIN — ACETAMINOPHEN 500 MG: 500 TABLET, FILM COATED ORAL at 19:31

## 2018-05-11 ASSESSMENT — ENCOUNTER SYMPTOMS
DIFFICULTY URINATING: 0
FATIGUE: 1
VOMITING: 0
FREQUENCY: 0
DIZZINESS: 1
COLOR CHANGE: 0
HEADACHES: 1
NAUSEA: 1
DYSURIA: 0

## 2018-05-11 NOTE — ED PROVIDER NOTES
History     Chief Complaint:  Dizziness    HPI   Brittney Morris is a 42 year old female with a history of anxiety, seizure, and syncope who presents to the emergency department with her  for evaluation of dizziness. Of note, the patient has a family history of seizures and has had one seizure in the past but she has not had a seizure diagnosis and is not on any seizure medications. In October 2017, she experienced the same symptoms in addition to fainting. MRI and EEG tests were conducted in October which was normal. However, there was a cyst on her brain but she was told it was nothing concerning. Today, the patient reported feelings of dizziness, nausea, a really hot sensation, and experiencing an out of body dream like state while driving her car. She sometimes feels like throwing up, which improves while lying down. She additionally has had headaches and has been taking ibuprofen with no improvement. Her arm and leg movements are normal and she denies any tingling sensations. Her  states she is lethargic and tired. She denies vomiting, diarrhea, vaginal bleeding, or pain with urination. She is currently menstruating which has gotten heavier with age, otherwise normal. No history of migraines. She is on Sertraline for anxiety.    Of note, patient also experiences pain on the side of her buttocks which radiates down her legs which she attributes to holding kids. The pain comes and goes but sometimes is so bad she can t walk.      Allergies:  NKDA     Medications:    Sertraline     Past Medical History:    Anxiety  Seizure  UTI  Syncope  No history of migraines    Past Surgical History:    Dilation and curettage  [place] teeth removal    Family History:    No past pertinent family history.    Social History:  Negative for tobacco use.  Negative for alcohol use.  Patient presents with her   Marital Status:        Review of Systems   Constitutional: Positive for fatigue.  "  Gastrointestinal: Positive for nausea. Negative for vomiting.   Genitourinary: Negative for difficulty urinating, dysuria, frequency and urgency.   Musculoskeletal:        Right buttocks pain   Skin: Negative for color change and rash.   Neurological: Positive for dizziness and headaches.   All other systems reviewed and are negative.    Physical Exam   First Vitals:  BP: 148/82  Pulse: 71  Temp: 98.2  F (36.8  C)  Resp: 16  Height: 160 cm (5' 3\")  Weight: 58.1 kg (128 lb)  SpO2: 99 %    Physical Exam  Vitals: reviewed by me  General: Pt seen on [facility], cooperative, and alert to conversation  Eyes: Tracking well, clear conjunctiva BL  ENT: MMM, midline trachea.   Lungs: No tachypnea, no accessory muscle use. No respiratory distress.   CV: Rate as above, regular rhythm.    Abd: Soft, non tender, no guarding, no rebound. Non distended  MSK: no peripheral edema or joint effusion.  No evidence of trauma  Skin: No rash, normal turgor and temperature  Neuro: Clear speech and no facial droop.  Bilateral upper and bilateral lower extremities are with sensation intact light touch and 5 out of 5 motor.  Ambulatory in the ED was strong independent gait.  Cranial nerves II through XII are intact bilaterally.  Following all commands, clear speech.  Psych: Not RIS, no e/o AH/VH      Emergency Department Course   ECG:  Indication: dizziness  Time: 1810  Vent. Rate 70 bpm. SD interval 120. QRS duration 86. QT/QTc 398/429. P-R-T axis 63 45 25. Normal sinus rhythm with sinus arrhythmia. Normal ECG. Agrees with computer interpretation.  Read time: 1824.     Laboratory:  CBC: WBC: 8.1, HGB: 12.6, PLT: 189  BMP:  Calcium, o/w WNL (Creatinine: 0.63)    Interventions:  1824 Reglan 10 mg IV  1931 Tylenol 500 mg PO    Emergency Department Course:  1810 Nursing notes and vitals reviewed.  I performed an exam of the patient as documented above.     IV inserted. Medicine administered as documented above. Blood drawn. This " "was sent to the lab for further testing, results above.    EKG obtained in the ED, see results above.     2042 I rechecked the patient and discussed the results of her workup thus far.     Findings and plan explained to the Patient. Patient discharged home with instructions regarding supportive care, medications, and reasons to return. The importance of close follow-up was reviewed.    I personally reviewed the laboratory results with the Patient and answered all related questions prior to discharge.   Impression & Plan    Medical Decision Making:  Brittney Morris is a 42 year old female who presents to the emergency room with what appears to be a certain type of lightheadedness as well as mild frontal headache. Headache itself is not thunder clap in origin, comes and goes throughout the week, and is not associated with any visual changes. She does appear to have a \"out of body experience\" that she feels like she is a dream-like state. She is able to do all of her ADLs, she has a normal neurological exam here in the ED, has normal vital signs, and normal labs. With her fatigue, and her stated heavy periods, I did consider possible anemia versus metabolic derangement, but these labs are also normal. Her symptoms are mildly improved here with tylenol and Reglan. This may be a migraine syndrome. We did discuss the possibility of advanced imaging, though I don't think it is worth the risk in the risk-benefit ratio as the patient has a non focal neurological exam and her headache is not thunderclap. She has also had this before and has had extensive imaging including MRI and EEG. We will discharge with very clear return to ED precautions, she was given two different neurological outpatient visits for the patient to follow up with should this be an atypical migraine. Patient is okay with this plan, understands she did not get a formal diagnosis today and that outpatient follow-up is therefore even more " important.    Critical Care time:  none    Diagnosis:    ICD-10-CM    1. Nonintractable episodic headache, unspecified headache type R51    2. Alteration in sensory perception R20.9        Disposition:  discharged to home    Scribe Disclosure:  I, Richelle Obrien, am serving as a scribe on 5/11/2018 at 6:10 PM to personally document services performed by Jj Alexandre MD based on my observations and the provider's statements to me.     Richelle Obrien  5/11/2018    EMERGENCY DEPARTMENT       Jj Alexandre MD  05/11/18 2222

## 2018-05-11 NOTE — ED AVS SNAPSHOT
Emergency Department    [address] [facility] [address]    Phone:  [phone]    Fax:  [phone]                                       Brittney Morris   MRN: [id number]    Department:   Emergency Department   Date of Visit:  5/11/2018           Patient Information     Date Of Birth          1975        Your diagnoses for this visit were:     Nonintractable episodic headache, unspecified headache type     Alteration in sensory perception        You were seen by Jj Alexandre MD.      Follow-up Information     Follow up with [facility]. Schedule an appointment as soon as possible for a visit in 1 week.    Why:  For repeat evaluation and symptom check    Contact information:    [address]  [address]  [facility] [phone]        Follow up with Neurology, [facility] In 1 week.    Why:  For repeat evaluation and symptom check    Contact information:    [address]  [address]  [facility] [id number] [phone]          Follow up with  Emergency Department.    Specialty:  EMERGENCY MEDICINE    Why:  If symptoms worsen    Contact information:    [address] [facility] [id number] [phone]        Discharge Instructions         Rebound Headache     Overuse of pain medications can lead to rebound headaches.   You use pain medicines called analgesics to treat your headaches. You are now having more frequent or intense headaches (rebound headaches). They are your body s response to too much pain medicine. Prescription pain medicines can cause these headaches. But so can over-the-counter medicines like acetaminophen or ibuprofen. A drug that contains caffeine or butalbital is most likely to cause rebound headache.  Symptoms of rebound headache include:    Mild to moderate headache for 15 or more days each month for 3 months or more    Headache when you wake up that continues most of  the day    Headaches getting worse over time    Need for more and more medicine to treat headaches  Rebound headaches are most often diagnosed by your symptoms and medicine history. You may need tests to rule out other causes of your headaches. In the emergency room, you may be given a non-analgesic pain medicine to treat the pain or stop future headaches.  Home care  Treatment involves stopping use of your pain medicines. Your healthcare provider can tell you how to safely do this. You may be able to stop right away. Or you may need to take less and less over time (taper off). This will depend on the medicines you have been taking. To do this, follow the schedule that your provider gives you. If you are taking pain medicines for other types of pain at the same time, your healthcare provider may need other specialists to participate in your care.    For the first week or so after stopping, the headaches will likely get worse. You may also have withdrawal symptoms. These often include nausea, vomiting, and trouble sleeping. You may be given a medicine to help relieve pain and withdrawal symptoms. Take this exactly as you have been told. It is vital to avoid taking daily pain medicine. If you do so, rebound headaches will continue.    Caffeine can make rebound headaches worse. If you have caffeinated drinks every day, slowly cut your intake.    Keep a written log of your headaches. This can help you and your healthcare provider track your progress.    Be patient. It can take about 2 to 6 months to stop having rebound headaches.    Once you have broken the headache cycle, be careful not to start it again. Work with your provider to make a treatment plan for headache pain that has low risk of causing rebound headaches.    Relaxation can help lower tension and relieve pain. Try a massage, meditation, yoga, or other relaxation techniques. Or make time for a relaxing hobby that you enjoy.  Follow-up care  Follow up with  your healthcare provider, or as advised.  When to seek medical advice  Call your healthcare provider right away if any of these occur:    Fever of 100.4 F (38 C) or higher    Headaches that wake you from sleep    Repeated vomiting or visual problems that don't go away    Headache with a stiff neck, rash, confusion, weakness, numbness, seizure (convulsion), or trouble talking    Headache that starts after a head injury or fall    A type of headache you have never had before    Headache that gets worse despite rest and medicine  Date Last Reviewed: 11/20/2015 2000-2017 [employer]. [address] [place] [address]. All rights reserved. This information is not intended as a substitute for professional medical care. Always follow your healthcare professional's instructions.           * HEADACHE [unspecified]    The cause of your headache today is not clear, but it does not appear to be the sign of any serious illness.  Under stress, some people tense the muscles of their shoulder, neck and scalp without knowing it. If this condition lasts long enough, a TENSION HEADACHE can occur.  A MIGRAINE HEADACHE is caused by changes in blood flow to the brain. It can be mild or severe. A migraine attack may be triggered by emotional stress, hormone changes during the menstrual cycle, oral contraceptives, alcohol use, certain foods containing tyramine, eye strain, weather changes, missing meals, lack of sleep or oversleeping.  Other causes of headache include a viral illness, sinus, ear or throat infection, dental pain and TMJ (jaw joint) pain.  HOME CARE:      If you were given pain medicine for this headache, do not drive yourself home. Arrange for a ride, instead. When you get home, try to sleep. You should feel much better when you wake up.    If you are having nausea or vomiting, follow a light diet until your headache is relieved.    If you have a migraine type headache, use sunglasses when in the  daylight or around bright indoor lighting until symptoms improve. Bright glaring light can worsen this kind of headache.  FOLLOW UP with your doctor if the headache is not better within the next 24 hours. If you have frequent headaches you should discuss a treatment plan with your primary care doctor. By being aware of the earliest signs of headache, and starting treatment right away, you may be able to stop the pain yourself.  GET PROMPT MEDICAL ATTENTION if any of the following occur:    Worsening of your head pain or no improvement within 24 hours    Repeated vomiting (unable to keep liquids down)    Fever over 101 F (38.3 C)    Stiff neck    Extreme drowsiness, confusion or fainting    Weakness of an arm or leg or one side of the face    Difficulty with speech or vision    7303-9699 [employer]. [address] [place] [address], [place] [address]. All rights reserved. This information is not intended as a substitute for professional medical care. Always follow your healthcare professional's instructions.  This information has been modified by your health care provider with permission from the publisher.      24 Hour Appointment Hotline       To make an appointment at any [facility], call [phone] ([phone]). If you don't have a family doctor or clinic, we will help you find one. [facility] are conveniently located to serve the needs of you and your family.             Review of your medicines      Our records show that you are taking the medicines listed below. If these are incorrect, please call your family doctor or clinic.        Dose / Directions Last dose taken    * IBUPROFEN PO   Dose:  200-400 mg        Take 200-400 mg by mouth every 6 hours as needed for moderate pain   Refills:  0        * ibuprofen 400-800 mg tablet   Commonly known as:  ADVIL,MOTRIN   Dose:  400-800 mg   Quantity:  90 tablet        Take 1-2 tablets (400-800 mg) by mouth every 6 hours as needed for other  (cramping)   Refills:  0        SERTRALINE HCL PO   Dose:  50 mg        Take 50 mg by mouth daily   Refills:  0        * Notice:  This list has 2 medication(s) that are the same as other medications prescribed for you. Read the directions carefully, and ask your doctor or other care provider to review them with you.            Procedures and tests performed during your visit     Basic metabolic panel    CBC with platelets differential    EKG 12-lead, tracing only      Orders Needing Specimen Collection     None      Pending Results     Date and Time Order Name Status Description    5/11/2018 1802 EKG 12-lead, tracing only Preliminary             Pending Culture Results     No orders found from 5/9/2018 to 5/12/2018.            Pending Results Instructions     If you had any lab results that were not finalized at the time of your Discharge, you can call the ED Lab Result RN at [phone]. You will be contacted by this team for any positive Lab results or changes in treatment. The nurses are available 7 days a week from 10A to 6:30P.  You can leave a message 24 hours per day and they will return your call.        Test Results From Your Hospital Stay        5/11/2018  7:07 PM      Component Results     Component Value Ref Range & Units Status    WBC 8.1 4.0 - 11.0 10e9/L Final    RBC Count 4.25 3.8 - 5.2 10e12/L Final    Hemoglobin 12.6 11.7 - 15.7 g/dL Final    Hematocrit 38.0 35.0 - 47.0 % Final    MCV 89 78 - 100 fl Final    MCH 29.6 26.5 - 33.0 pg Final    MCHC 33.2 31.5 - 36.5 g/dL Final    RDW 12.5 10.0 - 15.0 % Final    Platelet Count 189 150 - 450 10e9/L Final    Diff Method Automated Method  Final    % Neutrophils 80.4 % Final    % Lymphocytes 12.3 % Final    % Monocytes 6.5 % Final    % Eosinophils 0.5 % Final    % Basophils 0.1 % Final    % Immature Granulocytes 0.2 % Final    Nucleated RBCs 0 0 /100 Final    Absolute Neutrophil 6.5 1.6 - 8.3 10e9/L Final    Absolute Lymphocytes 1.0 0.8 - 5.3 10e9/L Final     Absolute Monocytes 0.5 0.0 - 1.3 10e9/L Final    Absolute Eosinophils 0.0 0.0 - 0.7 10e9/L Final    Absolute Basophils 0.0 0.0 - 0.2 10e9/L Final    Abs Immature Granulocytes 0.0 0 - 0.4 10e9/L Final    Absolute Nucleated RBC 0.0  Final         5/11/2018  7:27 PM      Component Results     Component Value Ref Range & Units Status    Sodium 136 133 - 144 mmol/L Final    Potassium 4.0 3.4 - 5.3 mmol/L Final    Chloride 104 94 - 109 mmol/L Final    Carbon Dioxide 24 20 - 32 mmol/L Final    Anion Gap 8 3 - 14 mmol/L Final    Glucose 98 70 - 99 mg/dL Final    Urea Nitrogen 15 7 - 30 mg/dL Final    Creatinine 0.63 0.52 - 1.04 mg/dL Final    GFR Estimate >90 >60 mL/min/1.7m2 Final    Non  GFR Calc    GFR Estimate If Black >90 >60 mL/min/1.7m2 Final    African American GFR Calc    Calcium 8.2 (L) 8.5 - 10.1 mg/dL Final                Clinical Quality Measure: Blood Pressure Screening     Your blood pressure was checked while you were in the emergency department today. The last reading we obtained was  BP: 148/82 . Please read the guidelines below about what these numbers mean and what you should do about them.  If your systolic blood pressure (the top number) is less than 120 and your diastolic blood pressure (the bottom number) is less than 80, then your blood pressure is normal. There is nothing more that you need to do about it.  If your systolic blood pressure (the top number) is 120-139 or your diastolic blood pressure (the bottom number) is 80-89, your blood pressure may be higher than it should be. You should have your blood pressure rechecked within a year by a primary care provider.  If your systolic blood pressure (the top number) is 140 or greater or your diastolic blood pressure (the bottom number) is 90 or greater, you may have high blood pressure. High blood pressure is treatable, but if left untreated over time it can put you at risk for heart attack, stroke, or kidney failure. You should  "have your blood pressure rechecked by a primary care provider within the next 4 weeks.  If your provider in the emergency department today gave you specific instructions to follow-up with your doctor or provider even sooner than that, you should follow that instruction and not wait for up to 4 weeks for your follow-up visit.        Thank you for choosing [facility]       Thank you for choosing [facility] for your care. Our goal is always to provide you with excellent care. Hearing back from our patients is one way we can continue to improve our services. Please take a few minutes to complete the written survey that you may receive in the mail after you visit with us. Thank you!        [employer] Information     [email or web address] lets you send messages to your doctor, view your test results, renew your prescriptions, schedule appointments and more. To sign up, go to [email or web address] . Click on \"Log in\" on the left side of the screen, which will take you to the Welcome page. Then click on \"Sign up Now\" on the right side of the page.     You will be asked to enter the access code listed below, as well as some personal information. Please follow the directions to create your username and password.     Your access code is: [email or web address]  Expires: 2018  7:44 PM     Your access code will  in 90 days. If you need help or a new code, please call your [facility] clinic or [phone].        Care EveryWhere ID     This is your Care EveryWhere ID. This could be used by other organizations to access your [facility] medical records  [id number]        Equal Access to Services     MILLICENT HAMPTON : Hadii heather clark hadasho Soomaali, waaxda luqadaha, qaybta kaalmada adeegyada, keaton conway . So [facility] [phone].    ATENCIÓN: Si habla español, tiene a allen disposición servicios gratuitos de asistencia lingüística. Llame al [phone].    We comply with applicable federal civil rights laws and [place] laws. We " do not discriminate on the basis of race, color, national origin, age, disability, sex, sexual orientation, or gender identity.            After Visit Summary       This is your record. Keep this with you and show to your community pharmacist(s) and doctor(s) at your next visit.

## 2018-05-11 NOTE — ED AVS SNAPSHOT
Emergency Department    [address] [facility] [address]    Phone:  [phone]    Fax:  [phone]                                       Brittney Morris   MRN: [id number]    Department:   Emergency Department   Date of Visit:  5/11/2018           After Visit Summary Signature Page     I have received my discharge instructions, and my questions have been answered. I have discussed any challenges I see with this plan with the nurse or doctor.    ..........................................................................................................................................  Patient/Patient Representative Signature      ..........................................................................................................................................  Patient Representative Print Name and Relationship to Patient    ..................................................               ................................................  Date                                            Time    ..........................................................................................................................................  Reviewed by Signature/Title    ...................................................              ..............................................  Date                                                            Time

## 2018-05-12 NOTE — DISCHARGE INSTRUCTIONS
Rebound Headache     Overuse of pain medications can lead to rebound headaches.   You use pain medicines called analgesics to treat your headaches. You are now having more frequent or intense headaches (rebound headaches). They are your body s response to too much pain medicine. Prescription pain medicines can cause these headaches. But so can over-the-counter medicines like acetaminophen or ibuprofen. A drug that contains caffeine or butalbital is most likely to cause rebound headache.  Symptoms of rebound headache include:    Mild to moderate headache for 15 or more days each month for 3 months or more    Headache when you wake up that continues most of the day    Headaches getting worse over time    Need for more and more medicine to treat headaches  Rebound headaches are most often diagnosed by your symptoms and medicine history. You may need tests to rule out other causes of your headaches. In the emergency room, you may be given a non-analgesic pain medicine to treat the pain or stop future headaches.  Home care  Treatment involves stopping use of your pain medicines. Your healthcare provider can tell you how to safely do this. You may be able to stop right away. Or you may need to take less and less over time (taper off). This will depend on the medicines you have been taking. To do this, follow the schedule that your provider gives you. If you are taking pain medicines for other types of pain at the same time, your healthcare provider may need other specialists to participate in your care.    For the first week or so after stopping, the headaches will likely get worse. You may also have withdrawal symptoms. These often include nausea, vomiting, and trouble sleeping. You may be given a medicine to help relieve pain and withdrawal symptoms. Take this exactly as you have been told. It is vital to avoid taking daily pain medicine. If you do so, rebound headaches will continue.    Caffeine can make rebound  headaches worse. If you have caffeinated drinks every day, slowly cut your intake.    Keep a written log of your headaches. This can help you and your healthcare provider track your progress.    Be patient. It can take about 2 to 6 months to stop having rebound headaches.    Once you have broken the headache cycle, be careful not to start it again. Work with your provider to make a treatment plan for headache pain that has low risk of causing rebound headaches.    Relaxation can help lower tension and relieve pain. Try a massage, meditation, yoga, or other relaxation techniques. Or make time for a relaxing hobby that you enjoy.  Follow-up care  Follow up with your healthcare provider, or as advised.  When to seek medical advice  Call your healthcare provider right away if any of these occur:    Fever of 100.4 F (38 C) or higher    Headaches that wake you from sleep    Repeated vomiting or visual problems that don't go away    Headache with a stiff neck, rash, confusion, weakness, numbness, seizure (convulsion), or trouble talking    Headache that starts after a head injury or fall    A type of headache you have never had before    Headache that gets worse despite rest and medicine  Date Last Reviewed: 11/20/2015 2000-2017 [employer]. [address] [place] [address]. All rights reserved. This information is not intended as a substitute for professional medical care. Always follow your healthcare professional's instructions.           * HEADACHE [unspecified]    The cause of your headache today is not clear, but it does not appear to be the sign of any serious illness.  Under stress, some people tense the muscles of their shoulder, neck and scalp without knowing it. If this condition lasts long enough, a TENSION HEADACHE can occur.  A MIGRAINE HEADACHE is caused by changes in blood flow to the brain. It can be mild or severe. A migraine attack may be triggered by emotional stress, hormone  changes during the menstrual cycle, oral contraceptives, alcohol use, certain foods containing tyramine, eye strain, weather changes, missing meals, lack of sleep or oversleeping.  Other causes of headache include a viral illness, sinus, ear or throat infection, dental pain and TMJ (jaw joint) pain.  HOME CARE:      If you were given pain medicine for this headache, do not drive yourself home. Arrange for a ride, instead. When you get home, try to sleep. You should feel much better when you wake up.    If you are having nausea or vomiting, follow a light diet until your headache is relieved.    If you have a migraine type headache, use sunglasses when in the daylight or around bright indoor lighting until symptoms improve. Bright glaring light can worsen this kind of headache.  FOLLOW UP with your doctor if the headache is not better within the next 24 hours. If you have frequent headaches you should discuss a treatment plan with your primary care doctor. By being aware of the earliest signs of headache, and starting treatment right away, you may be able to stop the pain yourself.  GET PROMPT MEDICAL ATTENTION if any of the following occur:    Worsening of your head pain or no improvement within 24 hours    Repeated vomiting (unable to keep liquids down)    Fever over 101 F (38.3 C)    Stiff neck    Extreme drowsiness, confusion or fainting    Weakness of an arm or leg or one side of the face    Difficulty with speech or vision    2997-3605 [employer]. [address] [place] [address]. All rights reserved. This information is not intended as a substitute for professional medical care. Always follow your healthcare professional's instructions.  This information has been modified by your health care provider with permission from the publisher.

## 2018-06-06 ENCOUNTER — TRANSFERRED RECORDS (OUTPATIENT)
Dept: HEALTH INFORMATION MANAGEMENT | Facility: CLINIC | Age: 43
End: 2018-06-06

## 2018-06-07 ENCOUNTER — TRANSFERRED RECORDS (OUTPATIENT)
Dept: HEALTH INFORMATION MANAGEMENT | Facility: CLINIC | Age: 43
End: 2018-06-07

## 2018-06-14 ENCOUNTER — TRANSFERRED RECORDS (OUTPATIENT)
Dept: HEALTH INFORMATION MANAGEMENT | Facility: CLINIC | Age: 43
End: 2018-06-14

## 2018-06-14 ENCOUNTER — MEDICAL CORRESPONDENCE (OUTPATIENT)
Dept: HEALTH INFORMATION MANAGEMENT | Facility: CLINIC | Age: 43
End: 2018-06-14

## 2018-06-22 ENCOUNTER — TELEPHONE (OUTPATIENT)
Dept: SURGERY | Facility: CLINIC | Age: 43
End: 2018-06-22

## 2018-06-22 NOTE — TELEPHONE ENCOUNTER
Patient scheduled to see Dr. Naomi Lewis on 7/17/2018 check in at 1245 at [facility].    Stacey Chu RN, BSN, OCN  Nurse Navigator   [facility]  [phone]

## 2018-07-17 ENCOUNTER — TELEPHONE (OUTPATIENT)
Dept: SURGERY | Facility: CLINIC | Age: 43
End: 2018-07-17

## 2018-07-17 ENCOUNTER — OFFICE VISIT (OUTPATIENT)
Dept: SURGERY | Facility: CLINIC | Age: 43
End: 2018-07-17
Payer: COMMERCIAL

## 2018-07-17 VITALS
SYSTOLIC BLOOD PRESSURE: 137 MMHG | BODY MASS INDEX: 22.68 KG/M2 | HEIGHT: 63 IN | WEIGHT: 128 LBS | DIASTOLIC BLOOD PRESSURE: 80 MMHG | HEART RATE: 67 BPM

## 2018-07-17 DIAGNOSIS — N64.89 RADIAL SCAR OF BREAST: Primary | ICD-10-CM

## 2018-07-17 PROCEDURE — 99205 OFFICE O/P NEW HI 60 MIN: CPT | Performed by: SURGERY

## 2018-07-17 NOTE — LETTER
2018    Re: Brittney Morris - 1975      Brittney Morris is a 42 year old female who is seen in consultation at the request of Dr. Walton for evaluation of increased risk of breast cancer. She has a sister who was diagnosed with breast cancer at age 42. She was seen by genetics and negative at that time for gene mutation (5 years ago). Brittney reports having some right breast pain in  and Dr. Walton ordered diagnostic right breast imaging. This revealed two small areas of concern and these were biopsied. I have copies of reports from [facility] and am able to view her images in PACS. On the reports from [facility] the pathology from site A was a benign papilloma. Site B was a radial scar (complex sclerosing lesion). She was told her pathology was benign and to return to routine imaging. Given her family history she was sent to see me for discussion regarding high risk screening. She is no longer having right breast pain. No nipple drainage or discharge. No prior biopsies.      Hormonal history:  Pre menopausal, 5 years OCP use, no HRT, no fertility treatment.  2 children, 1st at age 38, menarche 12. She  for 2 months per child. .     Family history of breast cancer: Yes - sister at 42 and maternal aunt in her 50s.   Family history of ovarian cancer:  No  Family history of colon cancer: No  Family history of prostate cancer: No     [email or web address]  Digital mammogram diagnostic unilateral Meli synthesis: No suspicious mass, suspicious microcalcifications or areas of architectural distortion.  Specifically no mammographic abnormality seen in the region of the localized pain in the posterior medial right breast.     Ultrasound: Ultrasound of the right medial breast was performed.  An oval hypoechoic solid-appearing mass is present at the 12 o'clock position 4 cm from the nipple measuring 0.6 cm.  A round hypoechoic mass with through transmission is present at the 1230 position, 2 cm from the  "nipple measuring 0.6 cm which may represent a complicated cyst.  Scattered tiny benign cysts are noted in the medial right breast.     BI-RADS category: 4 suspicious -ultrasound-guided biopsy recommended.     6/7/2018: Site a colon 1230 position, 2 cm from nipple measuring 0.6 cm was biopsied.  An X shaped clip was placed     Site B; 0.6 cm ill-defined hypoechoic lesion at 12:00, 4 cm from the nipple was biopsied -Q shaped clip was placed     Percutaneous core needle biopsy, right:   Formal pathology report not currently available but pathology reports listed on radiology imaging states:      \"A: Benign intraductal papilloma  B: Fragments of a radial scar (complex sclerosing lesion (with associated calcifications.  There is no evidence of invasive malignancy on this biopsy specimen.  Imaging reviewed with Dr. Santiago and Dr. Parisi who agreed the pathologic findings are concordant with radiologic findings.     Both lesions have been adequately sampled and no further follow-up is necessary.  The patient may return to routine screening mammograms.\"     Past Medical History:   has a past medical history of Anxiety; Missed ab; Seizures (H) (1985); and UTI (urinary tract infection).        Current Outpatient Prescriptions:      ibuprofen (ADVIL,MOTRIN) 400-800 mg tablet, Take 1-2 tablets (400-800 mg) by mouth every 6 hours as needed for other (cramping), Disp: 90 tablet, Rfl: 0     IBUPROFEN PO, Take 200-400 mg by mouth every 6 hours as needed for moderate pain, Disp: , Rfl:      SERTRALINE HCL PO, Take 50 mg by mouth daily, Disp: , Rfl:   No current facility-administered medications for this visit.      Facility-Administered Medications Ordered in Other Visits:      lidocaine-EPINEPHrine 1.5 %-1:038497 injection, , , PRN, [place] MD, 3 mL at 03/29/16 1331                ROS:  The 10 point review of systems is negative other than noted in the HPI and above.     PE:  Vitals: /80  Pulse 67  Ht 5' " "3\" (1.6 m)  Wt 128 lb (58.1 kg)  BMI 22.67 kg/m2  General appearance: well-nourished, sitting comfortably, no apparent distress  Psych: normal affect, pleasant  HEENT:  Head normocephalic and atraumatic, pupils equal and round, conjunctivae clear, mucous membranes moist, external ears and nose normal  Neck: Supple without thyromegaly or masses  Lungs: Respirations unlabored  Lymphatic: No cervical, or supraclavicular lymphadenopathy  Extremities: Without edema  Musculoskeletal:  Normal station and gait  Neurologic: nonfocal, grossly intact times four extremities, alert and oriented times three  Psychiatric: Mood and affect are appropriate  Skin: Without lesions or rashes     Breast:  A bilateral breast exam was performed in the supine position.. Bilateral breasts were palpated in a circumferential clockwise fashion including the supraclavicular and axillary areas.   Breasts are symmetrical with no skin or nipple changes.  The contour is normal.  Parenchyma is very dense especially in the central portion of the breast.  There are no palpable masses.      Lymph:                                                No supraclavicular/infraclavicular adenopathy.                         Axillary adenopathy: none     Assessment:  Right  breast radial scar formation     Plan:  Brittney is a very pleasant 42-year-old female with a strong family history of breast cancer in a sister who was diagnosed at 42 years old and meets criteria for high risk screening by alternating MRI and mammogram going forward every 6 months.  I will have her meet with genetic counselor to discuss possibility of genetic testing, her sister did have genetic testing this was 5 years ago and there may be updates.       In regard to her pathology from her recent ultrasound-guided biopsy did reveal a radial scar at 12:00 4 cm from the nipple.  We discussed options regarding this lesion and the surgical literature would support excisional biopsy to ensure no " associated DCIS or malignancy.  I discussed with her that our current literature reports between 8-17% chance of associated DCIS or malignancy for radial scars.  We discussed the procedure for a seed localized excisional breast biopsy of the lesion at 12:00 4 cm from the nipple.  She would like to proceed with excision and we will work on scheduling this.  She understands the risks, benefits, indications and alternatives.  I will also work on obtaining the formal pathology report from her biopsies.           Naomi Lewis MD

## 2018-07-17 NOTE — NURSING NOTE
Breast Patients    BREAST PATIENTS (ALL)    1-Do you have any of the following symptoms? Other: none  2-In which breast are you having the symptoms? none  3-Do you use hormones?  No  4-Have you had a Mammogram? Yes  Where: [facility] imaging  Date: 6/18  5-Have you ever had a breast cyst drained? No  6-Have you ever had a breast biopsy? Yes  Side: right  Date: 6/18  7-Have you ever had a Breast Cancer? No   8-Is there a history of Breast Cancer in your family? Yes   Relationship to you:    Sister  Aunt  9-Have you ever had Ovarian Cancer? No  10-Is there a history of Ovarian Cancer in your family? No  11-Summarize your caffeine intake (i.e. coffee, tea, chocolate, soda etc.): 1 soda per day     BREAST PATIENTS (FEMALE)    12-What age did your periods begin? 12  13-Date your last menstrual period began? 7/4/18  14-Number of full-term pregnancies: 2  15-Your age when your first child was born? 27  16-Did you nurse your children? Yes  17-Are you pregnant now? No  18-Have you begun menopause? No  19-Have you had either ovary removed?No  20-Do you have breast implants? No

## 2018-07-17 NOTE — TELEPHONE ENCOUNTER
Type of surgery: Seed localized right breast excisional biopsy  Location of surgery: [facility]  Date and time of surgery: 8/22/18 at 1:35pm  Surgeon: Dr. Naomi Lewis  Pre-Op Appt Date: Patient to schedule  Post-Op Appt Date: Patient to schedule   Packet sent out: Yes  Pre-cert/Authorization completed:  Not Applicable  Date: 7/17/18

## 2018-07-17 NOTE — PROGRESS NOTES
[facility] Breast Surgery Consultation    HPI:   Brittney Morris is a 42 year old female who is seen in consultation at the request of Dr. Walton for evaluation of increased risk of breast cancer. She has a sister who was diagnosed with breast cancer at age 42. She was seen by genetics and negative at that time for gene mutation (5 years ago). Brittney reports having some right breast pain in  and Dr. Walton ordered diagnostic right breast imaging. This revealed two small areas of concern and these were biopsied. I have copies of reports from [facility] and am able to view her images in PACS. On the reports from [facility] the pathology from site A was a benign papilloma. Site B was a radial scar (complex sclerosing lesion). She was told her pathology was benign and to return to routine imaging. Given her family history she was sent to see me for discussion regarding high risk screening. She is no longer having right breast pain. No nipple drainage or discharge. No prior biopsies.     Hormonal history:  Pre menopausal, 5 years OCP use, no HRT, no fertility treatment.  2 children, 1st at age 38, menarche 12. She  for 2 months per child. .    Family history of breast cancer: Yes - sister at 42 and maternal aunt in her 50s.   Family history of ovarian cancer:  No  Family history of colon cancer: No  Family history of prostate cancer: No    [email or web address]  Digital mammogram diagnostic unilateral Meli synthesis: No suspicious mass, suspicious microcalcifications or areas of architectural distortion.  Specifically no mammographic abnormality seen in the region of the localized pain in the posterior medial right breast.    Ultrasound: Ultrasound of the right medial breast was performed.  An oval hypoechoic solid-appearing mass is present at the 12 o'clock position 4 cm from the nipple measuring 0.6 cm.  A round hypoechoic mass with through transmission is present at the 1230 position, 2 cm from the nipple  "measuring 0.6 cm which may represent a complicated cyst.  Scattered tiny benign cysts are noted in the medial right breast.    BI-RADS category: 4 suspicious -ultrasound-guided biopsy recommended.    6/7/2018: Site a colon 1230 position, 2 cm from nipple measuring 0.6 cm was biopsied.  An X shaped clip was placed    Site B; 0.6 cm ill-defined hypoechoic lesion at 12:00, 4 cm from the nipple was biopsied -Q shaped clip was placed    Percutaneous core needle biopsy, right:   Formal pathology report not currently available but pathology reports listed on radiology imaging states:     \"A: Benign intraductal papilloma  B: Fragments of a radial scar (complex sclerosing lesion (with associated calcifications.  There is no evidence of invasive malignancy on this biopsy specimen.  Imaging reviewed with Dr. Santiago and Dr. Parisi who agreed the pathologic findings are concordant with radiologic findings.    Both lesions have been adequately sampled and no further follow-up is necessary.  The patient may return to routine screening mammograms.\"    Past Medical History:   has a past medical history of Anxiety; Missed ab; Seizures (H) (1985); and UTI (urinary tract infection).      Current Outpatient Prescriptions:      ibuprofen (ADVIL,MOTRIN) 400-800 mg tablet, Take 1-2 tablets (400-800 mg) by mouth every 6 hours as needed for other (cramping), Disp: 90 tablet, Rfl: 0     IBUPROFEN PO, Take 200-400 mg by mouth every 6 hours as needed for moderate pain, Disp: , Rfl:      SERTRALINE HCL PO, Take 50 mg by mouth daily, Disp: , Rfl:   No current facility-administered medications for this visit.     Facility-Administered Medications Ordered in Other Visits:      lidocaine-EPINEPHrine 1.5 %-1:920170 injection, , , PRN, [place] MD, 3 mL at 03/29/16 1331    Past Surgical History:  Past Surgical History:   Procedure Laterality Date     DILATION AND CURETTAGE SUCTION  12/3/2012    Procedure: DILATION AND CURETTAGE " "SUCTION;  SUCTION DILATION AND CURETTAGE;  Surgeon: Nargis Gordon MD;  Location: [facility]     wisdom teeth[           No Known Allergies      Social History:  Social History     Social History     Marital status:      Spouse name: N/A     Number of children: N/A     Years of education: N/A     Occupational History     Not on file.     Social History Main Topics     Smoking status: Never Smoker     Smokeless tobacco: Never Used     Alcohol use No     Drug use: No     Sexual activity: Yes     Partners: Male     Other Topics Concern     Not on file     Social History Narrative        ROS:  The 10 point review of systems is negative other than noted in the HPI and above.    PE:  Vitals: /80  Pulse 67  Ht 5' 3\" (1.6 m)  Wt 128 lb (58.1 kg)  BMI 22.67 kg/m2  General appearance: well-nourished, sitting comfortably, no apparent distress  Psych: normal affect, pleasant  HEENT:  Head normocephalic and atraumatic, pupils equal and round, conjunctivae clear, mucous membranes moist, external ears and nose normal  Neck: Supple without thyromegaly or masses  Lungs: Respirations unlabored  Lymphatic: No cervical, or supraclavicular lymphadenopathy  Extremities: Without edema  Musculoskeletal:  Normal station and gait  Neurologic: nonfocal, grossly intact times four extremities, alert and oriented times three  Psychiatric: Mood and affect are appropriate  Skin: Without lesions or rashes    Breast:  A bilateral breast exam was performed in the supine position.. Bilateral breasts were palpated in a circumferential clockwise fashion including the supraclavicular and axillary areas.   Breasts are symmetrical with no skin or nipple changes.  The contour is normal.  Parenchyma is very dense especially in the central portion of the breast.  There are no palpable masses.     Lymph:       No supraclavicular/infraclavicular adenopathy.   Axillary adenopathy: none    Assessment:  Right  breast radial scar " formation    Plan:  Brittney is a very pleasant 42-year-old female with a strong family history of breast cancer in a sister who was diagnosed at 42 years old and meets criteria for high risk screening by alternating MRI and mammogram going forward every 6 months.  I will have her meet with genetic counselor to discuss possibility of genetic testing, her sister did have genetic testing this was 5 years ago and there may be updates.      In regard to her pathology from her recent ultrasound-guided biopsy did reveal a radial scar at 12:00 4 cm from the nipple.  We discussed options regarding this lesion and the surgical literature would support excisional biopsy to ensure no associated DCIS or malignancy.  I discussed with her that our current literature reports between 8-17% chance of associated DCIS or malignancy for radial scars.  We discussed the procedure for a seed localized excisional breast biopsy of the lesion at 12:00 4 cm from the nipple.  She would like to proceed with excision and we will work on scheduling this.  She understands the risks, benefits, indications and alternatives.  I will also work on obtaining the formal pathology report from her biopsies.        Naomi Lewis MD      Please route or send letter to:  Primary Care Provider (PCP) and Referring Provider

## 2018-07-17 NOTE — MR AVS SNAPSHOT
After Visit Summary   7/17/2018    Brittney Morris    MRN: [id number]           Patient Information     Date Of Birth          1975        Visit Information        Provider Department      7/17/2018 1:00 PM Naomi Lewis MD [facility]      Today's Diagnoses     Radial scar of breast    -  1       Follow-ups after your visit        Additional Services     CANCER RISK MGMT/CANCER GENETIC COUNSELING REFERRAL       Your provider has referred you to the Cancer Risk Management Program - Cancer Genetic Counseling.    Reason for Referral: right breast radial scar, strong family history of breast cancer    We have a sent a notice to a staff member of the Cancer Risk Management Program to give you a call to assist with scheduling your appointment.  You may also call  [phone]-[facility] ([phone]) to initiate scheduling.    Please be aware that coverage of these services is subject to the terms and limitations of your health insurance plan.  Call member services at your health plan with any benefit or coverage questions.      Please bring the completed family history sheet to your appointment in addition to any available outside medical records documenting your cancer diagnosis.                  Your next 10 appointments already scheduled     Aug 22, 2018 11:30 AM CDT   US BREAST RADIOACTIVE SEED LOCALIZATION RIGHT with BCUS1,  BREAST NURSE,  BREAST RAD   [facility] ([facility])    [address] [place] [address], [address] [place] [id number] [phone]           Please bring a list of your medicines (including vitamins, minerals and over-the-counter drugs). Also, tell your doctor about any allergies you may have. Wear comfortable clothes and leave your valuables at home.  You do not need to do anything special to prepare for your exam.  Please call the Imaging Department  at your exam site with any questions.            Aug 22, 2018 12:25 PM CDT   MA POST PROCEDURE RIGHT with [email or web address]   [facility] ([facility])    [address], [address]  [facility] [address]   [phone]           Do not use any powder, lotion or deodorant under your arms or on your breast. If you do, we will ask you to remove it before your exam.  Wear comfortable, two-piece clothing.  If you have any allergies, tell your care team.  Bring any previous mammograms from other facilities or have them mailed to the breast center.            Aug 22, 2018   Procedure with Naomi Lewis MD   [facility] (--)    [address]  [facility] [address]   [phone]            Aug 22, 2018  1:20 PM CDT   MA BREAST SPECIMEN RIGHT OR with [email or web address]   [facility] ([facility])    [address] [place] [address], [address]  [facility] [address]   [phone]           Do not use any powder, lotion or deodorant under your arms or on your breast. If you do, we will ask you to remove it before your exam.  Wear comfortable, two-piece clothing.  If you have any allergies, tell your care team.  Bring any previous mammograms from other facilities or have them mailed to the breast center.            Aug 22, 2018  1:30 PM CDT   [facility] Same Day Surgery with Naomi Lewis MD   [facility]    [facility]   [phone]              Future tests that were ordered for you today     Open Future Orders        Priority Expected Expires Ordered    US Breast Radioactive Seed Placement, 1St Lesion Right Routine 7/17/2018 7/17/2019 7/17/2018    MA Breast Specimen Right OR Routine 7/17/2018 7/17/2019 7/17/2018    MA Post Procedure Right Routine 7/17/2018 7/17/2019 7/17/2018            Who to contact     If you  "have questions or need follow up information about today's clinic visit or your schedule please contact [facility] directly at [phone].  Normal or non-critical lab and imaging results will be communicated to you by MyChart, letter or phone within 4 business days after the clinic has received the results. If you do not hear from us within 7 days, please contact the clinic through [employer] or phone. If you have a critical or abnormal lab result, we will notify you by phone as soon as possible.  Submit refill requests through [employer] or call your pharmacy and they will forward the refill request to us. Please allow 3 business days for your refill to be completed.          Additional Information About Your Visit        [employer] Information     [employer] lets you send messages to your doctor, view your test results, renew your prescriptions, schedule appointments and more. To sign up, go to [email or web address] . Click on \"Log in\" on the left side of the screen, which will take you to the Welcome page. Then click on \"Sign up Now\" on the right side of the page.     You will be asked to enter the access code listed below, as well as some personal information. Please follow the directions to create your username and password.     Your access code is: [email or web address]  Expires: 2018  7:44 PM     Your access code will  in 90 days. If you need help or a new code, please call your [facility] clinic or [phone].        Care EveryWhere ID     This is your Care EveryWhere ID. This could be used by other organizations to access your [facility] medical records  [id number]        Your Vitals Were     Pulse Height BMI (Body Mass Index)             67 5' 3\" (1.6 m) 22.67 kg/m2          Blood Pressure from Last 3 Encounters:   18 137/80   18 148/82   10/11/17 118/73    Weight from Last 3 Encounters:   18 128 lb (58.1 kg)   18 128 lb (58.1 kg)   10/10/17 125 lb (56.7 kg) "              We Performed the Following     CANCER RISK MGMT/CANCER GENETIC COUNSELING REFERRAL        Primary Care Provider Office Phone # Fax #    Renetta Golden [phone]       [facility] [address] [place] [address]        Equal Access to Services     DOUG HAMPTON : Hadii aad ku hadasho Soomaali, waaxda luqadaha, qaybta kaalmada adeegyada, waxay richardin hayaan jeramy taliachelsea lastephanie ewing. So [facility] [phone].    ATENCIÓN: Si habla español, tiene a allen disposición servicios gratuitos de asistencia lingüística. Hermes al [phone].    We comply with applicable federal civil rights laws and [place] laws. We do not discriminate on the basis of race, color, national origin, age, disability, sex, sexual orientation, or gender identity.            Thank you!     Thank you for choosing [facility]  for your care. Our goal is always to provide you with excellent care. Hearing back from our patients is one way we can continue to improve our services. Please take a few minutes to complete the written survey that you may receive in the mail after your visit with us. Thank you!             Your Updated Medication List - Protect others around you: Learn how to safely use, store and throw away your medicines at www.disposemymeds.org.          This list is accurate as of 7/17/18 11:59 PM.  Always use your most recent med list.                   Brand Name Dispense Instructions for use Diagnosis    * IBUPROFEN PO      Take 200-400 mg by mouth every 6 hours as needed for moderate pain        * ibuprofen 400-800 mg tablet    ADVIL,MOTRIN    90 tablet    Take 1-2 tablets (400-800 mg) by mouth every 6 hours as needed for other (cramping)    Vacuum extractor delivery, delivered       SERTRALINE HCL PO      Take 50 mg by mouth daily        * Notice:  This list has 2 medication(s) that are the same as other medications prescribed for you. Read the directions  carefully, and ask your doctor or other care provider to review them with you.

## 2018-08-21 ENCOUNTER — TRANSFERRED RECORDS (OUTPATIENT)
Dept: HEALTH INFORMATION MANAGEMENT | Facility: CLINIC | Age: 43
End: 2018-08-21

## 2018-08-22 ENCOUNTER — ANESTHESIA (OUTPATIENT)
Dept: SURGERY | Facility: CLINIC | Age: 43
End: 2018-08-22
Payer: COMMERCIAL

## 2018-08-22 ENCOUNTER — HOSPITAL ENCOUNTER (OUTPATIENT)
Facility: CLINIC | Age: 43
Discharge: HOME | End: 2018-08-22
Attending: SURGERY | Admitting: SURGERY
Payer: COMMERCIAL

## 2018-08-22 ENCOUNTER — HOSPITAL ENCOUNTER (OUTPATIENT)
Dept: MAMMOGRAPHY | Facility: CLINIC | Age: 43
End: 2018-08-22
Attending: SURGERY
Payer: COMMERCIAL

## 2018-08-22 ENCOUNTER — SURGERY (OUTPATIENT)
Age: 43
End: 2018-08-22

## 2018-08-22 ENCOUNTER — ANESTHESIA EVENT (OUTPATIENT)
Dept: SURGERY | Facility: CLINIC | Age: 43
End: 2018-08-22
Payer: COMMERCIAL

## 2018-08-22 ENCOUNTER — APPOINTMENT (OUTPATIENT)
Dept: SURGERY | Facility: PHYSICIAN GROUP | Age: 43
End: 2018-08-22
Payer: COMMERCIAL

## 2018-08-22 VITALS
TEMPERATURE: 98.4 F | OXYGEN SATURATION: 98 % | WEIGHT: 130.7 LBS | SYSTOLIC BLOOD PRESSURE: 118 MMHG | DIASTOLIC BLOOD PRESSURE: 79 MMHG | HEART RATE: 65 BPM | RESPIRATION RATE: 16 BRPM | BODY MASS INDEX: 23.16 KG/M2 | HEIGHT: 63 IN

## 2018-08-22 DIAGNOSIS — N64.89 RADIAL SCAR OF BREAST: ICD-10-CM

## 2018-08-22 DIAGNOSIS — G89.18 ACUTE POST-OPERATIVE PAIN: Primary | ICD-10-CM

## 2018-08-22 LAB — HCG UR QL: NEGATIVE

## 2018-08-22 PROCEDURE — 37000009 ZZH ANESTHESIA TECHNICAL FEE, EACH ADDTL 15 MIN: Performed by: SURGERY

## 2018-08-22 PROCEDURE — 25000132 ZZH RX MED GY IP 250 OP 250 PS 637: Performed by: SURGERY

## 2018-08-22 PROCEDURE — 25000125 ZZHC RX 250: Performed by: SURGERY

## 2018-08-22 PROCEDURE — 71000013 ZZH RECOVERY PHASE 1 LEVEL 1 EA ADDTL HR: Performed by: SURGERY

## 2018-08-22 PROCEDURE — 37000008 ZZH ANESTHESIA TECHNICAL FEE, 1ST 30 MIN: Performed by: SURGERY

## 2018-08-22 PROCEDURE — 88307 TISSUE EXAM BY PATHOLOGIST: CPT | Performed by: SURGERY

## 2018-08-22 PROCEDURE — 25000125 ZZHC RX 250: Performed by: NURSE ANESTHETIST, CERTIFIED REGISTERED

## 2018-08-22 PROCEDURE — 25000128 H RX IP 250 OP 636: Performed by: NURSE ANESTHETIST, CERTIFIED REGISTERED

## 2018-08-22 PROCEDURE — 40000986 MA POST PROCEDURE RIGHT

## 2018-08-22 PROCEDURE — 36000052 ZZH SURGERY LEVEL 2 EA 15 ADDTL MIN: Performed by: SURGERY

## 2018-08-22 PROCEDURE — 19285 PERQ DEV BREAST 1ST US IMAG: CPT | Mod: RT

## 2018-08-22 PROCEDURE — 36000050 ZZH SURGERY LEVEL 2 1ST 30 MIN: Performed by: SURGERY

## 2018-08-22 PROCEDURE — 71000012 ZZH RECOVERY PHASE 1 LEVEL 1 FIRST HR: Performed by: SURGERY

## 2018-08-22 PROCEDURE — 25000128 H RX IP 250 OP 636: Performed by: SURGERY

## 2018-08-22 PROCEDURE — 88307 TISSUE EXAM BY PATHOLOGIST: CPT | Mod: 26 | Performed by: SURGERY

## 2018-08-22 PROCEDURE — 27210794 ZZH OR GENERAL SUPPLY STERILE: Performed by: SURGERY

## 2018-08-22 PROCEDURE — 40000170 ZZH STATISTIC PRE-PROCEDURE ASSESSMENT II: Performed by: SURGERY

## 2018-08-22 PROCEDURE — 19301 PARTIAL MASTECTOMY: CPT | Performed by: SURGERY

## 2018-08-22 PROCEDURE — 25000128 H RX IP 250 OP 636: Performed by: ANESTHESIOLOGY

## 2018-08-22 PROCEDURE — 40000268 MA BREAST SPECIMEN RIGHT OR

## 2018-08-22 PROCEDURE — 71000027 ZZH RECOVERY PHASE 2 EACH 15 MINS: Performed by: SURGERY

## 2018-08-22 PROCEDURE — 81025 URINE PREGNANCY TEST: CPT | Performed by: ANESTHESIOLOGY

## 2018-08-22 RX ORDER — FENTANYL CITRATE 50 UG/ML
25-50 INJECTION, SOLUTION INTRAMUSCULAR; INTRAVENOUS
Status: DISCONTINUED | OUTPATIENT
Start: 2018-08-22 | End: 2018-08-22 | Stop reason: HOSPADM

## 2018-08-22 RX ORDER — BUPIVACAINE HYDROCHLORIDE 2.5 MG/ML
INJECTION, SOLUTION EPIDURAL; INFILTRATION; INTRACAUDAL
Status: DISCONTINUED
Start: 2018-08-22 | End: 2018-08-22 | Stop reason: HOSPADM

## 2018-08-22 RX ORDER — HYDROMORPHONE HYDROCHLORIDE 1 MG/ML
.3-.5 INJECTION, SOLUTION INTRAMUSCULAR; INTRAVENOUS; SUBCUTANEOUS EVERY 10 MIN PRN
Status: DISCONTINUED | OUTPATIENT
Start: 2018-08-22 | End: 2018-08-22 | Stop reason: HOSPADM

## 2018-08-22 RX ORDER — SODIUM CHLORIDE, SODIUM LACTATE, POTASSIUM CHLORIDE, CALCIUM CHLORIDE 600; 310; 30; 20 MG/100ML; MG/100ML; MG/100ML; MG/100ML
INJECTION, SOLUTION INTRAVENOUS CONTINUOUS
Status: DISCONTINUED | OUTPATIENT
Start: 2018-08-22 | End: 2018-08-22 | Stop reason: HOSPADM

## 2018-08-22 RX ORDER — HYDROCODONE BITARTRATE AND ACETAMINOPHEN 5; 325 MG/1; MG/1
1-2 TABLET ORAL EVERY 4 HOURS PRN
Qty: 20 TABLET | Refills: 0 | Status: SHIPPED | OUTPATIENT
Start: 2018-08-22 | End: 2020-12-18

## 2018-08-22 RX ORDER — HYDROCODONE BITARTRATE AND ACETAMINOPHEN 5; 325 MG/1; MG/1
1 TABLET ORAL ONCE
Status: COMPLETED | OUTPATIENT
Start: 2018-08-22 | End: 2018-08-22

## 2018-08-22 RX ORDER — ONDANSETRON 4 MG/1
4 TABLET, ORALLY DISINTEGRATING ORAL EVERY 30 MIN PRN
Status: DISCONTINUED | OUTPATIENT
Start: 2018-08-22 | End: 2018-08-22 | Stop reason: HOSPADM

## 2018-08-22 RX ORDER — MEPERIDINE HYDROCHLORIDE 25 MG/ML
12.5 INJECTION INTRAMUSCULAR; INTRAVENOUS; SUBCUTANEOUS
Status: DISCONTINUED | OUTPATIENT
Start: 2018-08-22 | End: 2018-08-22 | Stop reason: HOSPADM

## 2018-08-22 RX ORDER — SODIUM CHLORIDE, SODIUM LACTATE, POTASSIUM CHLORIDE, CALCIUM CHLORIDE 600; 310; 30; 20 MG/100ML; MG/100ML; MG/100ML; MG/100ML
INJECTION, SOLUTION INTRAVENOUS CONTINUOUS PRN
Status: DISCONTINUED | OUTPATIENT
Start: 2018-08-22 | End: 2018-08-22

## 2018-08-22 RX ORDER — LIDOCAINE HYDROCHLORIDE 10 MG/ML
INJECTION, SOLUTION EPIDURAL; INFILTRATION; INTRACAUDAL; PERINEURAL
Status: DISCONTINUED
Start: 2018-08-22 | End: 2018-08-22 | Stop reason: HOSPADM

## 2018-08-22 RX ORDER — CEFAZOLIN SODIUM 1 G/3ML
1 INJECTION, POWDER, FOR SOLUTION INTRAMUSCULAR; INTRAVENOUS SEE ADMIN INSTRUCTIONS
Status: DISCONTINUED | OUTPATIENT
Start: 2018-08-22 | End: 2018-08-22 | Stop reason: HOSPADM

## 2018-08-22 RX ORDER — CEFAZOLIN SODIUM 2 G/100ML
2 INJECTION, SOLUTION INTRAVENOUS
Status: COMPLETED | OUTPATIENT
Start: 2018-08-22 | End: 2018-08-22

## 2018-08-22 RX ORDER — LIDOCAINE HYDROCHLORIDE 20 MG/ML
INJECTION, SOLUTION INFILTRATION; PERINEURAL PRN
Status: DISCONTINUED | OUTPATIENT
Start: 2018-08-22 | End: 2018-08-22

## 2018-08-22 RX ORDER — SENNOSIDES A AND B 8.6 MG/1
1 TABLET, FILM COATED ORAL 2 TIMES DAILY PRN
Qty: 10 TABLET | Refills: 0 | Status: SHIPPED | OUTPATIENT
Start: 2018-08-22 | End: 2020-12-18

## 2018-08-22 RX ORDER — NALOXONE HYDROCHLORIDE 0.4 MG/ML
.1-.4 INJECTION, SOLUTION INTRAMUSCULAR; INTRAVENOUS; SUBCUTANEOUS
Status: DISCONTINUED | OUTPATIENT
Start: 2018-08-22 | End: 2018-08-22 | Stop reason: HOSPADM

## 2018-08-22 RX ORDER — ONDANSETRON 2 MG/ML
4 INJECTION INTRAMUSCULAR; INTRAVENOUS EVERY 30 MIN PRN
Status: DISCONTINUED | OUTPATIENT
Start: 2018-08-22 | End: 2018-08-22 | Stop reason: HOSPADM

## 2018-08-22 RX ORDER — PROPOFOL 10 MG/ML
INJECTION, EMULSION INTRAVENOUS PRN
Status: DISCONTINUED | OUTPATIENT
Start: 2018-08-22 | End: 2018-08-22

## 2018-08-22 RX ORDER — ONDANSETRON 2 MG/ML
INJECTION INTRAMUSCULAR; INTRAVENOUS PRN
Status: DISCONTINUED | OUTPATIENT
Start: 2018-08-22 | End: 2018-08-22

## 2018-08-22 RX ORDER — FENTANYL CITRATE 50 UG/ML
INJECTION, SOLUTION INTRAMUSCULAR; INTRAVENOUS PRN
Status: DISCONTINUED | OUTPATIENT
Start: 2018-08-22 | End: 2018-08-22

## 2018-08-22 RX ORDER — PROPOFOL 10 MG/ML
INJECTION, EMULSION INTRAVENOUS CONTINUOUS PRN
Status: DISCONTINUED | OUTPATIENT
Start: 2018-08-22 | End: 2018-08-22

## 2018-08-22 RX ORDER — DEXAMETHASONE SODIUM PHOSPHATE 4 MG/ML
INJECTION, SOLUTION INTRA-ARTICULAR; INTRALESIONAL; INTRAMUSCULAR; INTRAVENOUS; SOFT TISSUE PRN
Status: DISCONTINUED | OUTPATIENT
Start: 2018-08-22 | End: 2018-08-22

## 2018-08-22 RX ADMIN — LIDOCAINE HYDROCHLORIDE 5 ML: 10 INJECTION, SOLUTION INFILTRATION; PERINEURAL at 11:47

## 2018-08-22 RX ADMIN — PROPOFOL 100 MCG/KG/MIN: 10 INJECTION, EMULSION INTRAVENOUS at 13:22

## 2018-08-22 RX ADMIN — SODIUM CHLORIDE, POTASSIUM CHLORIDE, SODIUM LACTATE AND CALCIUM CHLORIDE: 600; 310; 30; 20 INJECTION, SOLUTION INTRAVENOUS at 13:22

## 2018-08-22 RX ADMIN — PROPOFOL 20 MG: 10 INJECTION, EMULSION INTRAVENOUS at 13:22

## 2018-08-22 RX ADMIN — PROPOFOL 20 MG: 10 INJECTION, EMULSION INTRAVENOUS at 13:49

## 2018-08-22 RX ADMIN — BUPIVACAINE HYDROCHLORIDE 10 ML: 2.5 INJECTION, SOLUTION EPIDURAL; INFILTRATION; INTRACAUDAL; PERINEURAL at 13:59

## 2018-08-22 RX ADMIN — ONDANSETRON 4 MG: 2 INJECTION INTRAMUSCULAR; INTRAVENOUS at 13:41

## 2018-08-22 RX ADMIN — HYDROCODONE BITARTRATE AND ACETAMINOPHEN 1 TABLET: 5; 325 TABLET ORAL at 15:27

## 2018-08-22 RX ADMIN — CEFAZOLIN SODIUM 2 G: 2 INJECTION, SOLUTION INTRAVENOUS at 13:32

## 2018-08-22 RX ADMIN — FENTANYL CITRATE 50 MCG: 50 INJECTION, SOLUTION INTRAMUSCULAR; INTRAVENOUS at 13:49

## 2018-08-22 RX ADMIN — DEXAMETHASONE SODIUM PHOSPHATE 4 MG: 4 INJECTION, SOLUTION INTRA-ARTICULAR; INTRALESIONAL; INTRAMUSCULAR; INTRAVENOUS; SOFT TISSUE at 13:41

## 2018-08-22 RX ADMIN — FENTANYL CITRATE 50 MCG: 50 INJECTION, SOLUTION INTRAMUSCULAR; INTRAVENOUS at 14:55

## 2018-08-22 RX ADMIN — PROPOFOL 20 MG: 10 INJECTION, EMULSION INTRAVENOUS at 13:21

## 2018-08-22 RX ADMIN — FENTANYL CITRATE 50 MCG: 50 INJECTION, SOLUTION INTRAMUSCULAR; INTRAVENOUS at 13:20

## 2018-08-22 RX ADMIN — FENTANYL CITRATE 50 MCG: 50 INJECTION, SOLUTION INTRAMUSCULAR; INTRAVENOUS at 14:19

## 2018-08-22 RX ADMIN — PROPOFOL 20 MG: 10 INJECTION, EMULSION INTRAVENOUS at 13:20

## 2018-08-22 RX ADMIN — SODIUM CHLORIDE, POTASSIUM CHLORIDE, SODIUM LACTATE AND CALCIUM CHLORIDE: 600; 310; 30; 20 INJECTION, SOLUTION INTRAVENOUS at 14:19

## 2018-08-22 RX ADMIN — FENTANYL CITRATE 50 MCG: 50 INJECTION, SOLUTION INTRAMUSCULAR; INTRAVENOUS at 14:47

## 2018-08-22 RX ADMIN — LIDOCAINE HYDROCHLORIDE 40 MG: 20 INJECTION, SOLUTION INFILTRATION; PERINEURAL at 13:22

## 2018-08-22 RX ADMIN — MIDAZOLAM 2 MG: 1 INJECTION INTRAMUSCULAR; INTRAVENOUS at 13:20

## 2018-08-22 ASSESSMENT — LIFESTYLE VARIABLES: TOBACCO_USE: 0

## 2018-08-22 NOTE — ANESTHESIA POSTPROCEDURE EVALUATION
Patient: Brittney Zepedacody    Procedure(s):  RIGHT SEED LOCALIZED BREAST BIOPSY  - Wound Class: I-Clean    Diagnosis:RIGHT BREAST RADIAL SCAR   Diagnosis Additional Information: No value filed.    Anesthesia Type:  MAC    Note:  Anesthesia Post Evaluation    Patient location during evaluation: PACU  Patient participation: Able to fully participate in evaluation  Level of consciousness: awake  Pain management: adequate  Cardiovascular status: acceptable  Respiratory status: acceptable  Hydration status: acceptable  PONV: none     Anesthetic complications: None          Last vitals:  Vitals:    08/22/18 1227   BP: 123/71   Pulse: 65   Resp: 18   Temp: 35.9  C (96.7  F)   SpO2: 100%         Electronically Signed By: MIRA CARDOSO  August 22, 2018  3:01 PM

## 2018-08-22 NOTE — DISCHARGE INSTRUCTIONS
[facility]  Discharge Instructions: Post-Operative Breast Surgery    ACTIVITY    Take frequent short walks and increase your activity gradually.      Avoid strenuous physical activity or heavy lifting greater than 15-20 lbs. for 1-2 weeks with arm on the surgery side.  You may climb stairs.    Gentle rotation and stretching of your arms and shoulders will prevent joint stiffness.    You may drive without restrictions when you are not using any prescription pain medication and feel comfortable in a car.    You may return to work/school when you are comfortable without any prescription pain medication.    WOUND CARE    You may remove your outer dressing and shower 48 hours after the surgery.  Dr. Lewis did say, if your ACE wrap is intolerable, you may take this part of your dressing off before 48 hours is up.  Pat your incisions dry and leave them open to air.  Re-apply dressing (Band-Aids or gauze/tape) as needed for drainage.    You may have steri-strips (looks like white tape) or Dermabond (looks like glue) on your incisions.  You may peel off the steri-strips 2 weeks after your surgery if they have not peeled off on their own.  If you have Dermabond, it will peel up and fall off on its own.    Do not soak your incisions in a tub or pool for 2 weeks.     Do not apply any lotions, creams, or ointments to your incisions.    A ridge under your incisions is normal and will gradually resolve.    Wear a supportive bra for 1-2 weeks, day and night.    DIET    Start with liquids, then gradually resume your regular diet as tolerated.     Drink plenty of liquids to stay hydrated.    PAIN    Expect some tenderness and discomfort at the incision site(s).  Use the prescribed pain medication at your discretion.  Expect gradual resolution of your pain over several days.    You may take ibuprofen with food (unless you have been told not to) instead of or in addition to your prescribed  pain medication.  If you are taking Norco or Percocet, do not take any additional acetaminophen/APAP/Tylenol.    Do not drink alcohol or drive while you are taking pain medications.    You may apply ice to your incisions in 20 minute intervals as needed for the next 48 hours.  After that time, consider switching to heat if you prefer.    EXPECTATIONS    Pain medications can cause constipation.  Limit use when possible.  Take over the counter stool softener/stimulant, such as Colace or Senna, 1-2 times a day with plenty of water.  You may take a mild over the counter laxative, such as Miralax or a suppository, as needed.      You may discontinue these medications once you are having regular bowel movements and/or are no longer taking your narcotic pain medication.      RETURN APPOINTMENT    Follow up with your surgeon in 2 weeks.  Please call the office at [phone] to schedule your appointment.      CALL OUR OFFICE [phone] IF YOU HAVE:     Chills or fever above 101 F.    Increased redness, warmth, or drainage at your incisions.    Significant bleeding.    Pain not relieved by your pain medication or rest.    Increasing pain after the first 48 hours.    Any other concerns or questions.    Revised January 2018      Same Day Surgery Discharge Instructions for  Sedation and General Anesthesia       It's not unusual to feel dizzy, light-headed or faint for up to 24 hours after surgery or while taking pain medication.  If you have these symptoms: sit for a few minutes before standing and have someone assist you when you get up to walk or use the bathroom.      You should rest and relax for the next 24 hours. We recommend you make arrangements to have an adult stay with you for at least 24 hours after your discharge.  Avoid hazardous and strenuous activity.      DO NOT DRIVE any vehicle or operate mechanical equipment for 24 hours following the end of your surgery.  Even though you may feel normal, your  reactions may be affected by the medication you have received.      Do not drink alcoholic beverages for 24 hours following surgery.       Slowly progress to your regular diet as you feel able. It's not unusual to feel nauseated and/or vomit after receiving anesthesia.  If you develop these symptoms, drink clear liquids (apple juice, ginger ale, broth, 7-up, etc. ) until you feel better.  If your nausea and vomiting persists for 24 hours, please notify your surgeon.        All narcotic pain medications, along with inactivity and anesthesia, can cause constipation. Drinking plenty of liquids and increasing fiber intake will help.      For any questions of a medical nature, call your surgeon.      Do not make important decisions for 24 hours.      If you had general anesthesia, you may have a sore throat for a couple of days related to the breathing tube used during surgery.  You may use Cepacol lozenges to help with this discomfort.  If it worsens or if you develop a fever, contact your surgeon.       If you feel your pain is not well managed with the pain medications prescribed by your surgeon, please contact your surgeon's office to let them know so they can address your concerns.

## 2018-08-22 NOTE — PROGRESS NOTES
SBAR Seed Localization    SITUATION:  Patient to breast imaging center for imaging guided seed localizations before breast lumpectomy or excision biopsy without sentinel node injection.    BACKGROUND:  Breast imaging cancer, breast abnormality  Ordered procedure completed: Yes  Special needs identified: No     ASSESSMENT:  SBAR report called to patient care unit because of unexpected event in radiology: No  Allergies and medication list reviewed prior to procedure. Yes  Skin cleansed with ChloraPrep One-Step.  Anesthesia: approximately 5ml of 1% Lidocaine injection subcutaneous before seed insertion administered by the radiologist.   Gauze dressing over insertion site(s).  Post procedure mammogram completed: Yes    Patient tolerance:well    RECOMMENDATIONS:  Patient transferred to Same Day Surgery in stable condition via wheelchair with Breast Imaging Staff.  Copy of note given to patient and instructions to hand this note to surgery staff.    Please call [facility] [phone] if there are any questions.

## 2018-08-22 NOTE — OP NOTE
[facility] Breast Surgery Operative Note      Pre-operative diagnosis: Right breast radial scar formation   Post-operative diagnosis: Right breast radial scar formation     Procedure: 1.  RIGHT SEED LOCALIZED PARTIAL MASTECTOMY       Surgeon: Naomi Lewis MD   Assistant(s):  Jeremiah Barbosa PA-C  The PA s assistance was medically necessary to provide adequate exposure in the operating field, maintain hemostasis, cutting suture, clamping and ligating bleeding vessels, and visualization of anatomic structures throughout the surgical procedure.      Anesthesia: Local with MAC    Estimated blood loss:   15 cc     Specimens:   ID Type Source Tests Collected by Time Destination   A : right breast biopsy Tissue Breast, Right SURGICAL PATHOLOGY EXAM Naomi Lewis MD 8/22/2018  1:55 PM         INDICATION:  Brittney is a 42yof with a significant family history for breast cancer. She had a screening mammogram and two areas of concern were noted. These were biopsied and one was found to be a radial scar. The other was a benign papilloma. She elected to proceed with excisional biopsy with seed localization of the radial scar.   DESCRIPTION OF PROCEDURE: The patient was placed on the table in supine position. Conscious sedation was induced. Perioperative antibiotics were given.  The right breast and axilla were prepped and draped in standard sterile fashion.  We used the seed placed in the [facility] as well as the post-seed mammograms to localize the area of interest. We made a transverse incision centered at the 11 o'clock position. We carried the incision down using electrocautery into the breast tissue and excised the area of interest, including the seed.  The neoprobe was used to guide the dissection. The mass was removed in its entirety with some surrounding benign appearing breast tissue.  The inferior breast tissue was very dense. After the specimen was removed it had a high signal with the neoprobe. Once the mass  was removed, it was oriented with Ibarra dyes. A specimen mammogram was obtained and revealed both the Q shaped clip and the seed in the middle of the specimen. The specimen was then sent to pathology for review.  The wound was then examined for bleeding and hemostasis was achieved using electrocautery.    Hemostasis was maintained throughout with electrocautery. The field was irrigated with sterile saline.     The lumpectomy cavity was reapproximated with several interrupted 3-0 vicryl sutures. The skin was closed with a deep dermal 3-0 vicryl and running 4-0 Monocryl subcuticular suture and steri strips.  The patient tolerated the procedure well.  Sponge and instrument counts were correct.    Naomi Lewis MD  [facility]  [phone]

## 2018-08-22 NOTE — ANESTHESIA CARE TRANSFER NOTE
Patient: Brittney Morris    Procedure(s):  RIGHT SEED LOCALIZED BREAST BIOPSY  - Wound Class: I-Clean    Diagnosis: RIGHT BREAST RADIAL SCAR   Diagnosis Additional Information: No value filed.    Anesthesia Type:   MAC     Note:  Airway :Room Air  Patient transferred to:PACU  Comments: Pt to PACU on room air, airway patent, VSS.  Report to RN.Handoff Report: Identifed the Patient, Identified the Reponsible Provider, Reviewed the pertinent medical history, Discussed the surgical course, Reviewed Intra-OP anesthesia mangement and issues during anesthesia, Set expectations for post-procedure period and Allowed opportunity for questions and acknowledgement of understanding      Vitals: (Last set prior to Anesthesia Care Transfer)    CRNA VITALS  8/22/2018 1408 - 8/22/2018 1442      8/22/2018             Pulse: 55    SpO2: 100 %    Resp Rate (set): 10                Electronically Signed By: KUSH Kellogg CRNA  August 22, 2018  2:42 PM

## 2018-08-22 NOTE — ANESTHESIA PREPROCEDURE EVALUATION
Anesthesia Evaluation     . Pt has had prior anesthetic.     No history of anesthetic complications          ROS/MED HX    ENT/Pulmonary:      (-) tobacco use and sleep apnea   Neurologic:       Cardiovascular:  - neg cardiovascular ROS       METS/Exercise Tolerance:     Hematologic:         Musculoskeletal:         GI/Hepatic:     (+) GERD (takes meds PRN) Other,       Renal/Genitourinary:         Endo:         Psychiatric:     (+) psychiatric history anxiety      Infectious Disease:         Malignancy:         Other:                     Physical Exam  Normal systems: cardiovascular and pulmonary    Airway   Mallampati: II  TM distance: >3 FB  Neck ROM: full    Dental   Comment: native    Cardiovascular       Pulmonary                     Anesthesia Plan      History & Physical Review  History and physical reviewed and following examination; no interval change.    ASA Status:  1 .    NPO Status:  > 8 hours    Plan for MAC   PONV prophylaxis:  Ondansetron (or other 5HT-3) and Dexamethasone or Solumedrol       Postoperative Care      Consents  Anesthetic plan, risks, benefits and alternatives discussed with:  Patient..                          .

## 2018-08-22 NOTE — IP AVS SNAPSHOT
[facility] Same Day Surgery    [address]    [place] [address]    Phone:  [phone]    Fax:  [phone]                                       After Visit Summary   8/22/2018    Brittney Morris    MRN: [id number]           After Visit Summary Signature Page     I have received my discharge instructions, and my questions have been answered. I have discussed any challenges I see with this plan with the nurse or doctor.    ..........................................................................................................................................  Patient/Patient Representative Signature      ..........................................................................................................................................  Patient Representative Print Name and Relationship to Patient    ..................................................               ................................................  Date                                            Time    ..........................................................................................................................................  Reviewed by Signature/Title    ...................................................              ..............................................  Date                                                            Time

## 2018-08-22 NOTE — IP AVS SNAPSHOT
MRN:[id number]                      After Visit Summary   8/22/2018    Brittney Morris    MRN: [id number]           Thank you!     Thank you for choosing [facility] for your care. Our goal is always to provide you with excellent care. Hearing back from our patients is one way we can continue to improve our services. Please take a few minutes to complete the written survey that you may receive in the mail after you visit with us. Thank you!        Patient Information     Date Of Birth          1975        About your hospital stay     You were admitted on:  August 22, 2018 You last received care in the:  [facility] Same Day Surgery    You were discharged on:  August 22, 2018       Who to Call     For medical emergencies, please call 911.  For non-urgent questions about your medical care, please call your primary care provider or clinic, [phone]  For questions related to your surgery, please call your surgery clinic        Attending Provider     Provider Specialty    Naomi Lewis MD Surgery       Primary Care Provider Office Phone # Fax #    Johana Agrawal [phone]      Further instructions from your care team       [facility]  Discharge Instructions: Post-Operative Breast Surgery    ACTIVITY    Take frequent short walks and increase your activity gradually.      Avoid strenuous physical activity or heavy lifting greater than 15-20 lbs. for 1-2 weeks with arm on the surgery side.  You may climb stairs.    Gentle rotation and stretching of your arms and shoulders will prevent joint stiffness.    You may drive without restrictions when you are not using any prescription pain medication and feel comfortable in a car.    You may return to work/school when you are comfortable without any prescription pain medication.    WOUND CARE    You may remove your outer dressing and shower 48 hours after the surgery.  Dr. Lewis  did say, if your ACE wrap is intolerable, you may take this part of your dressing off before 48 hours is up.  Pat your incisions dry and leave them open to air.  Re-apply dressing (Band-Aids or gauze/tape) as needed for drainage.    You may have steri-strips (looks like white tape) or Dermabond (looks like glue) on your incisions.  You may peel off the steri-strips 2 weeks after your surgery if they have not peeled off on their own.  If you have Dermabond, it will peel up and fall off on its own.    Do not soak your incisions in a tub or pool for 2 weeks.     Do not apply any lotions, creams, or ointments to your incisions.    A ridge under your incisions is normal and will gradually resolve.    Wear a supportive bra for 1-2 weeks, day and night.    DIET    Start with liquids, then gradually resume your regular diet as tolerated.     Drink plenty of liquids to stay hydrated.    PAIN    Expect some tenderness and discomfort at the incision site(s).  Use the prescribed pain medication at your discretion.  Expect gradual resolution of your pain over several days.    You may take ibuprofen with food (unless you have been told not to) instead of or in addition to your prescribed pain medication.  If you are taking Norco or Percocet, do not take any additional acetaminophen/APAP/Tylenol.    Do not drink alcohol or drive while you are taking pain medications.    You may apply ice to your incisions in 20 minute intervals as needed for the next 48 hours.  After that time, consider switching to heat if you prefer.    EXPECTATIONS    Pain medications can cause constipation.  Limit use when possible.  Take over the counter stool softener/stimulant, such as Colace or Senna, 1-2 times a day with plenty of water.  You may take a mild over the counter laxative, such as Miralax or a suppository, as needed.      You may discontinue these medications once you are having regular bowel movements and/or are no longer taking your narcotic  pain medication.      RETURN APPOINTMENT    Follow up with your surgeon in 2 weeks.  Please call the office at [phone] to schedule your appointment.      CALL OUR OFFICE -[phone] IF YOU HAVE:     Chills or fever above 101 F.    Increased redness, warmth, or drainage at your incisions.    Significant bleeding.    Pain not relieved by your pain medication or rest.    Increasing pain after the first 48 hours.    Any other concerns or questions.    Revised January 2018      Same Day Surgery Discharge Instructions for  Sedation and General Anesthesia       It's not unusual to feel dizzy, light-headed or faint for up to 24 hours after surgery or while taking pain medication.  If you have these symptoms: sit for a few minutes before standing and have someone assist you when you get up to walk or use the bathroom.      You should rest and relax for the next 24 hours. We recommend you make arrangements to have an adult stay with you for at least 24 hours after your discharge.  Avoid hazardous and strenuous activity.      DO NOT DRIVE any vehicle or operate mechanical equipment for 24 hours following the end of your surgery.  Even though you may feel normal, your reactions may be affected by the medication you have received.      Do not drink alcoholic beverages for 24 hours following surgery.       Slowly progress to your regular diet as you feel able. It's not unusual to feel nauseated and/or vomit after receiving anesthesia.  If you develop these symptoms, drink clear liquids (apple juice, ginger ale, broth, 7-up, etc. ) until you feel better.  If your nausea and vomiting persists for 24 hours, please notify your surgeon.        All narcotic pain medications, along with inactivity and anesthesia, can cause constipation. Drinking plenty of liquids and increasing fiber intake will help.      For any questions of a medical nature, call your surgeon.      Do not make important decisions for 24 hours.      If you  "had general anesthesia, you may have a sore throat for a couple of days related to the breathing tube used during surgery.  You may use Cepacol lozenges to help with this discomfort.  If it worsens or if you develop a fever, contact your surgeon.       If you feel your pain is not well managed with the pain medications prescribed by your surgeon, please contact your surgeon's office to let them know so they can address your concerns.           Pending Results     Date and Time Order Name Status Description    2018 1355 Surgical pathology exam In process     2018 1110 MA Breast Specimen Right OR Preliminary     2018 1109 MA Post Procedure Right Preliminary     2018 1109 US Breast Radioactive Seed Placement, 1St Lesion Right Preliminary             Admission Information     Date & Time Provider Department Dept. Phone    2018 Naomi Lewis MD [facility] Same Day Surgery [phone]      Your Vitals Were     Blood Pressure Pulse Temperature Respirations Height Weight    104/62 65 98.4  F (36.9  C) 12 1.6 m (5' 3\") 59.3 kg (130 lb 11.2 oz)    Last Period Pulse Oximetry BMI (Body Mass Index)             2018 (Approximate) 95% 23.15 kg/m2         MyChart Information     [email or web address] lets you send messages to your doctor, view your test results, renew your prescriptions, schedule appointments and more. To sign up, go to [email or web address] . Click on \"Log in\" on the left side of the screen, which will take you to the Welcome page. Then click on \"Sign up Now\" on the right side of the page.     You will be asked to enter the access code listed below, as well as some personal information. Please follow the directions to create your username and password.     Your access code is: [email or web address]  Expires: 2018 11:09 AM     Your access code will  in 90 days. If you need help or a new code, please call your [facility] clinic or [phone].        Care EveryWhere ID     This is " your Care EveryWhere ID. This could be used by other organizations to access your [facility] medical records  [id number]        Equal Access to Services     DOUG HAMPTON : Hadii aad ku hadbrooklynantonia Kit, watutuda luiza, qaberthata kapatriciada fifi, [place] layanalarry rohan [facility] [phone].    ATENCIÓN: Si habla español, tiene a allen disposición servicios gratuitos de asistencia lingüística. Llame al [phone].    We comply with applicable federal civil rights laws and [place] laws. We do not discriminate on the basis of race, color, national origin, age, disability, sex, sexual orientation, or gender identity.               Review of your medicines      START taking        Dose / Directions    HYDROcodone-acetaminophen 5-325 MG per tablet   Commonly known as:  NORCO   Used for:  Acute post-operative pain   Notes to Patient:  You were given 1 tablet at 3:30 pm at the hospital.         Dose:  1-2 tablet   Take 1-2 tablets by mouth every 4 hours as needed for other (Moderate to Severe Pain)   Quantity:  20 tablet   Refills:  0       senna 8.6 MG tablet   Commonly known as:  SENOKOT   Used for:  Acute post-operative pain        Dose:  1 tablet   Take 1 tablet by mouth 2 times daily as needed for constipation   Quantity:  10 tablet   Refills:  0         CONTINUE these medicines which have NOT CHANGED        Dose / Directions    ibuprofen 400-800 mg tablet   Commonly known as:  ADVIL,MOTRIN   Used for:  Vacuum extractor delivery, delivered        Dose:  400-800 mg   Take 1-2 tablets (400-800 mg) by mouth every 6 hours as needed for other (cramping)   Quantity:  90 tablet   Refills:  0       SERTRALINE HCL PO        Dose:  50 mg   Take 50 mg by mouth daily   Refills:  0            Where to get your medicines      These medications were sent to [facility] - [address]  [address] [address]     Phone:  [phone]     senna 8.6 MG tablet          Some of these will need a paper prescription and others can be bought over the counter. Ask your nurse if you have questions.     Bring a paper prescription for each of these medications     HYDROcodone-acetaminophen 5-325 MG per tablet                Protect others around you: Learn how to safely use, store and throw away your medicines at www.disposemymeds.org.        Information about OPIOIDS     PRESCRIPTION OPIOIDS: WHAT YOU NEED TO KNOW   We gave you an opioid (narcotic) pain medicine. It is important to manage your pain, but opioids are not always the best choice. You should first try all the other options your care team gave you. Take this medicine for as short a time (and as few doses) as possible.    Some activities can increase your pain, such as bandage changes or therapy sessions. It may help to take your pain medicine 30 to 60 minutes before these activities. Reduce your stress by getting enough sleep, working on hobbies you enjoy and practicing relaxation or meditation. Talk to your care team about ways to manage your pain beyond prescription opioids.    These medicines have risks:    DO NOT drive when on new or higher doses of pain medicine. These medicines can affect your alertness and reaction times, and you could be arrested for driving under the influence (DUI). If you need to use opioids long-term, talk to your care team about driving.    DO NOT operate heavy machinery    DO NOT do any other dangerous activities while taking these medicines.    DO NOT drink any alcohol while taking these medicines.     If the opioid prescribed includes acetaminophen, DO NOT take with any other medicines that contain acetaminophen. Read all labels carefully. Look for the word  acetaminophen  or  Tylenol.  Ask your pharmacist if you have questions or are unsure.    You can get addicted to pain medicines, especially if you have a history of addiction (chemical, alcohol or substance dependence). Talk to your care  team about ways to reduce this risk.    All opioids tend to cause constipation. Drink plenty of water and eat foods that have a lot of fiber, such as fruits, vegetables, prune juice, apple juice and high-fiber cereal. Take a laxative (Miralax, milk of magnesia, Colace, Senna) if you don t move your bowels at least every other day. Other side effects include upset stomach, sleepiness, dizziness, throwing up, tolerance (needing more of the medicine to have the same effect), physical dependence and slowed breathing.    Store your pills in a secure place, locked if possible. We will not replace any lost or stolen medicine. If you don t finish your medicine, please throw away (dispose) as directed by your pharmacist. The [place] Pollution Control Agency has more information about safe disposal: https://www.pca.[facility].[place].us/living-green/managing-unwanted-medications             Medication List: This is a list of all your medications and when to take them. Check marks below indicate your daily home schedule. Keep this list as a reference.      Medications           Morning Afternoon Evening Bedtime As Needed    HYDROcodone-acetaminophen 5-325 MG per tablet   Commonly known as:  NORCO   Take 1-2 tablets by mouth every 4 hours as needed for other (Moderate to Severe Pain)   Last time this was given:  1 tablet on 8/22/2018  3:27 PM   Notes to Patient:  You were given 1 tablet at 3:30 pm at the hospital.                                 ibuprofen 400-800 mg tablet   Commonly known as:  ADVIL,MOTRIN   Take 1-2 tablets (400-800 mg) by mouth every 6 hours as needed for other (cramping)                                senna 8.6 MG tablet   Commonly known as:  SENOKOT   Take 1 tablet by mouth 2 times daily as needed for constipation                                SERTRALINE HCL PO   Take 50 mg by mouth daily

## 2018-08-24 LAB — COPATH REPORT: NORMAL

## 2018-09-04 ENCOUNTER — OFFICE VISIT (OUTPATIENT)
Dept: SURGERY | Facility: CLINIC | Age: 43
End: 2018-09-04
Payer: COMMERCIAL

## 2018-09-04 VITALS — HEART RATE: 63 BPM | SYSTOLIC BLOOD PRESSURE: 127 MMHG | DIASTOLIC BLOOD PRESSURE: 87 MMHG

## 2018-09-04 DIAGNOSIS — Z09 FOLLOW-UP EXAMINATION FOLLOWING SURGERY: Primary | ICD-10-CM

## 2018-09-04 PROCEDURE — 99024 POSTOP FOLLOW-UP VISIT: CPT | Performed by: SURGERY

## 2018-09-04 NOTE — LETTER
2018    Re: Brittney Morris - 1975    [facility] Breast Surgery Postoperative Note     S: Brittney returns for follow up two weeks s/p excision of right breast radial scar. She reports she is doing very well. No concerns. Minimal pain. Incision healing well.       Breasts: right breast upper outer incision is healing well. No surrounding erythema or induration. Mild underlying fibrosis.      Pathology:   - Biopsy site changes with no residual radial scar   - Fibrocystic changes with usual ductal hyperplasia and   microcalcifications   - Negative for in situ and invasive carcinoma     A/P  Brittney Morris is recovering from a excision of radial scar from right breast.   Going forward she should continue with annual screening mammogram. She had diagnostic imaging of the right breast only in 2018 due to breast pain, this led to biopsy of two areas in the right breast, one a benign papilloma and the other a radial scar. We have excised the radial scar and final pathology was benign. Her next imaging should be a screening mammogram in 2018 (tomosynthesis). We did discuss high risk screening given her family history of alternating breast MRI and Mammogram. She would like to do this and will plan for breast MRI 2019. She will also meet with genetics as we previously discussed.      Thank you for the opportunity to help in her care.     Naomi Lewis  [facility]  [phone]

## 2018-09-04 NOTE — PROGRESS NOTES
[facility] Breast Surgery Postoperative Note    S: Brittney returns for follow up two weeks s/p excision of right breast radial scar. She reports she is doing very well. No concerns. Minimal pain. Incision healing well.      Breasts: right breast upper outer incision is healing well. No surrounding erythema or induration. Mild underlying fibrosis.     Pathology:   - Biopsy site changes with no residual radial scar   - Fibrocystic changes with usual ductal hyperplasia and   microcalcifications   - Negative for in situ and invasive carcinoma    A/P  Brittney Morris is recovering from a excision of radial scar from right breast.   Going forward she should continue with annual screening mammogram. She had diagnostic imaging of the right breast only in June 2018 due to breast pain, this led to biopsy of two areas in the right breast, one a benign papilloma and the other a radial scar. We have excised the radial scar and final pathology was benign. Her next imaging should be a screening mammogram in December 2018 (tomosynthesis). We did discuss high risk screening given her family history of alternating breast MRI and Mammogram. She would like to do this and will plan for breast MRI June 2019. She will also meet with genetics as we previously discussed.     Thank you for the opportunity to help in her care.    Naomi Lewis  [facility]  [phone]    Please route or send letter to:  Primary Care Provider (PCP) and Referring Provider

## 2018-09-04 NOTE — MR AVS SNAPSHOT
"              After Visit Summary   9/4/2018    Brittney Morris    MRN: [id number]           Patient Information     Date Of Birth          1975        Visit Information        Provider Department      9/4/2018 8:45 AM Naomi Lewis MD [facility] General Surgery      Today's Diagnoses     Follow-up examination following surgery    -  1       Follow-ups after your visit        Future tests that were ordered for you today     Open Future Orders        Priority Expected Expires Ordered    MR Breast Bilateral w Contrast Routine 6/4/2019 9/4/2019 9/4/2018    MA Screen Bilateral w/Mike Routine 12/3/2018 9/4/2019 9/4/2018            Who to contact     If you have questions or need follow up information about today's clinic visit or your schedule please contact [facility] directly at [phone].  Normal or non-critical lab and imaging results will be communicated to you by [employer], letter or phone within 4 business days after the clinic has received the results. If you do not hear from us within 7 days, please contact the clinic through [employer] or phone. If you have a critical or abnormal lab result, we will notify you by phone as soon as possible.  Submit refill requests through nVoq or call your pharmacy and they will forward the refill request to us. Please allow 3 business days for your refill to be completed.          Additional Information About Your Visit        MyChart Information     [employer] lets you send messages to your doctor, view your test results, renew your prescriptions, schedule appointments and more. To sign up, go to [email or web address] . Click on \"Log in\" on the left side of the screen, which will take you to the Welcome page. Then click on \"Sign up Now\" on the right side of the page.     You will be asked to enter the access code listed below, as well as some personal information. Please follow the directions to " create your username and password.     Your access code is: [email or web address]  Expires: 2018 11:09 AM     Your access code will  in 90 days. If you need help or a new code, please call your [facility] clinic or [phone].        Care EveryWhere ID     This is your Care EveryWhere ID. This could be used by other organizations to access your [facility] medical records  [id number]        Your Vitals Were     Pulse                   63            Blood Pressure from Last 3 Encounters:   18 127/87   18 118/79   18 137/80    Weight from Last 3 Encounters:   18 130 lb 11.2 oz (59.3 kg)   18 128 lb (58.1 kg)   18 128 lb (58.1 kg)               Primary Care Provider Office Phone # Fax #    Johana Agrawal [phone]       [address] [address] [place] [address] [place] [address]        Equal Access to Services     [facility]: Hadii aad ku hadasho Soomaali, waaxda luqadaha, qaybta kaalmada adeegyada, keaton [place] . So [facility] [phone].    ATENCIÓN: Si habla español, tiene a allen disposición servicios gratuitos de asistencia lingüística. Llame al [phone].    We comply with applicable federal civil rights laws and [place] laws. We do not discriminate on the basis of race, color, national origin, age, disability, sex, sexual orientation, or gender identity.            Thank you!     Thank you for choosing [facility]  for your care. Our goal is always to provide you with excellent care. Hearing back from our patients is one way we can continue to improve our services. Please take a few minutes to complete the written survey that you may receive in the mail after your visit with us. Thank you!             Your Updated Medication List - Protect others around you: Learn how to safely use, store and throw away your medicines at www.disposemymeds.org.          This list is accurate as of 18  9:00 AM.   Always use your most recent med list.                   Brand Name Dispense Instructions for use Diagnosis    HYDROcodone-acetaminophen 5-325 MG per tablet    NORCO    20 tablet    Take 1-2 tablets by mouth every 4 hours as needed for other (Moderate to Severe Pain)    Acute post-operative pain       ibuprofen 400-800 mg tablet    ADVIL,MOTRIN    90 tablet    Take 1-2 tablets (400-800 mg) by mouth every 6 hours as needed for other (cramping)    Vacuum extractor delivery, delivered       senna 8.6 MG tablet    SENOKOT    10 tablet    Take 1 tablet by mouth 2 times daily as needed for constipation    Acute post-operative pain       SERTRALINE HCL PO      Take 50 mg by mouth daily

## 2018-12-05 ENCOUNTER — HOSPITAL ENCOUNTER (OUTPATIENT)
Dept: MAMMOGRAPHY | Facility: CLINIC | Age: 43
Discharge: HOME | End: 2018-12-05
Attending: SURGERY | Admitting: SURGERY
Payer: COMMERCIAL

## 2018-12-05 DIAGNOSIS — Z09 FOLLOW-UP EXAMINATION FOLLOWING SURGERY: ICD-10-CM

## 2018-12-05 PROCEDURE — 77063 BREAST TOMOSYNTHESIS BI: CPT

## 2019-01-21 ENCOUNTER — PRE VISIT (OUTPATIENT)
Dept: ONCOLOGY | Facility: CLINIC | Age: 44
End: 2019-01-21

## 2019-01-21 ENCOUNTER — ONCOLOGY VISIT (OUTPATIENT)
Dept: ONCOLOGY | Facility: CLINIC | Age: 44
End: 2019-01-21
Attending: GENETIC COUNSELOR, MS
Payer: COMMERCIAL

## 2019-01-21 DIAGNOSIS — Z80.3 FAMILY HISTORY OF MALIGNANT NEOPLASM OF BREAST: Primary | ICD-10-CM

## 2019-01-21 LAB — MISCELLANEOUS TEST: NORMAL

## 2019-01-21 PROCEDURE — 40000072 ZZH STATISTIC GENETIC COUNSELING, < 16 MIN: Performed by: GENETIC COUNSELOR, MS

## 2019-01-21 NOTE — PROGRESS NOTES
Patient seen, evaluated and discussed with the Genetic Counseling Intern. I have verified the documentation recorded by the scribe, which accurately reflects my assessment of the patient and the plan of care.    Gricelda Harley MS, [facility]  Licensed Genetic Counselor  [phone]

## 2019-01-21 NOTE — PROGRESS NOTES
1/21/2019    Referring Provider: Naomi Lewis MD    Presenting Information:   I met with Brittney Morris today for genetic counseling at the Cancer Risk Management Program at the [facility] to discuss her family history of breast cancer.  She is here today to review this history, cancer screening recommendations, and available genetic testing options.    Personal History:  Brittney is a 43 year old female.  She had a diagnostic imaging of the right breast only in June 2018. This led to biopsy of two lesions, one radial scar and one benign papilloma.  Brittney underwent an excisional biopsy of the radial scar, pathology was benign. Brittney  does not have any personal history of cancer.      She had her first menstrual period at age 12, her first child at age 38, and still has regular periods.  Brittney has her ovaries, fallopian tubes and uterus in place, and she has had no ovarian cancer screening to date.  She reports a 3-4 year history of oral contraceptive use and that she has no history of hormone replacement therapy.      Her most recent OB-GYN exam and Pap smear in 2018 were negative.  She has yearly clinical breast exams and mammograms. Her most recent screening mammogram in December of 2018 was negative.  Brittney has not had a colonoscopy.  Brittney reports that she has had 2 benign moles removed in 2013 and 2015. She had one severely atypical nevus with spitzoid features removed from her right upper back in 2011 and one mildly dysplastic nevus removed from her right lower chest in 2009. She follows up with dermatology annually.   She does not regularly do any other cancer screening at this time.  Brittney reported no tobacco use, and about 5 glasses of wine per week.    Family History: (Please see scanned pedigree for detailed family history information)    Brittney has a sister, age 48, who was diagnosed with breast cancer at the age of 42. She opted to undergo a double mastectomy. This sister reportedly underwent  genetic testing at the time of her diagnosis for the BRCA1 and BRCA2 genes, which came back negative.      Brittney has one maternal aunt who was diagnosed with breast cancer in her mid 50's and passed away in her mid 50's from her breast cancer.    Brittney has a paternal uncle who had a history of esophageal cancer in his late 70's with a history of smoking.     Her maternal ethnicity is [place] . Her paternal ethnicity is [place] .  There is no known Ashkenazi Mandaen ancestry on either side of her family. There is no reported consanguinity.    Discussion:    Brittney's family history of breast cancer is suggestive of a hereditary cancer syndrome.    We reviewed the features of sporadic, familial, and hereditary cancers.        We discussed the natural history and genetics of Hereditary Breast and Ovarian Cancer (HBOC) caused by BRCA1/2 mutations. Women with HBOC have an increased risk for breast and ovarian cancer.  There is also an increased risk for prostate and breast cancer among males with a BRCA1/BRCA2 mutation.  Males and females with BRCA1/BRCA2 mutations also face a slightly increased risk for pancreatic cancer.      A detailed handout regarding HBOC, hereditary breast cancer, and the information we discussed was provided to Brittney at the end of our appointment today and can be found in the after visit summary.  Topics included: inheritance pattern, cancer risks, cancer screening recommendations, and also risks, benefits and limitations of testing.    Based on her personal and family history, Brittney meets current National Comprehensive Cancer Network (NCCN) criteria for genetic testing of BRCA1 and BRCA2.      We discussed that there are additional genes  associated with an increased risk for breast cancer. As many of the genes associated with an increased risk for breast cancer present with overlapping features in a family and accurate cancer risk cannot always be established based upon the pedigree  analysis alone, it would be reasonable for Brittney to consider panel genetic testing to analyze multiple genes at once.  We reviewed options for genetic testing for hereditary breast and gynecologic cancers including actionable (CustomNext-Cancer 19-gene panel; BreastPlus + GynPlus + STK11, NBN, NF1) and expanded (OvaNext, Cancer Next) options. Brittney expressed interested in genetic testing for the actionable genes and opted to proceed with the CustomNext-Cancer 19-gene panel.   The CustomNext-Cancer high/moderate risk breast and gynecologic panel includes the following 19 genes: MARIIA, BRCA1, BRCA2, BRIP1, CDH1, CHEK2, EPCAM, MLH1, MSH2, MSH6, NBN, NF1, PALB2,  PMS2, PTEN, RAD51C, RAD51D, STK11, and TP53.    Some of the genes on this custom panel are associated with specific hereditary cancer syndromes and have published management guidelines:  Hereditary Breast and Ovarian Cancer syndrome (BRCA1, BRCA2), Chaudhry syndrome (EPCAM, MLH1, MSH2, MSH6, PMS2), Hereditary Diffuse Gastric Cancer (CDH1), Cowden Syndrome (PTEN), Peutz-Jeghers syndrome, neurofibromatosis type 1 (NF1), and Li Fraumeni syndrome (TP53).     Risk-reducing salpingo-oophorectomy can be considered in women with mutations in BRIP1, RAD51C, or RAD51D.  MARIIA, CHEK2, NBN, and PALB2 are associated with breast cancer risk and have published screening guidelines.    Consent was obtained and genetic testing for the CustomNext-Cancer 19-gene panel was sent to [facility]. The turnaround time for results is typically 3-4 weeks after insurance preverification.     Medical Management: For Brittney, we reviewed that the information from genetic testing may determine:    additional cancer screening for which Brittney may qualify (i.e. mammogram and breast MRI, more frequent colonoscopies, more frequent dermatologic exams, etc.),    options for risk reducing surgeries Brittney could consider (i.e. bilateral mastectomy, surgery to remove the ovaries and/or uterus, etc.),      and  targeted chemotherapies if she were to develop certain cancers in the future (i.e. immunotherapy for individuals with Chaudhry syndrome, PARP inhibitors, etc.).     These recommendations will be discussed in detail once genetic testing is completed.     Plan:  1) Today Brittney elected to proceed with CustomNext-Cancer19-gene panel through [employer].  2) This information should be available in 3-4 weeks after insurance preverification.  3) Brittney will be contacted to schedule a result disclosure appointment over the phone.    Face to face time: 45 minutes    Sabina Epperson MS  Genetic Counseling Intern    Addendum:  Patient seen, evaluated and discussed with the Genetic Counseling Intern. I have verified the content of the note, which accurately reflects my assessment of the patient and the plan of care.    Gricelda Harley MS, [facility]  Licensed Genetic Counselor  [phone]

## 2019-01-21 NOTE — LETTER
1/21/2019         RE: Brittney Morris  [address]  [facility] [id number]        Dear Colleague,    Thank you for referring your patient, Brittney Morris, to the CANCER RISK MANAGEMENT PROGRAM. Please see a copy of my visit note below.    1/21/2019    Referring Provider: Naomi Lewis MD    Presenting Information:   I met with Brittney Morris today for genetic counseling at the Cancer Risk Management Program at the [facility] to discuss her family history of breast cancer.  She is here today to review this history, cancer screening recommendations, and available genetic testing options.    Personal History:  Brittney is a 43 year old female.  She had a diagnostic imaging of the right breast only in June 2018. This led to biopsy of two lesions, one radial scar and one benign papilloma.  Brittney underwent an excisional biopsy of the radial scar, pathology was benign. Brittney  does not have any personal history of cancer.      She had her first menstrual period at age 12, her first child at age 38, and still has regular periods.  Brittney has her ovaries, fallopian tubes and uterus in place, and she has had no ovarian cancer screening to date.  She reports a 3-4 year history of oral contraceptive use and that she has no history of hormone replacement therapy.      Her most recent OB-GYN exam and Pap smear in 2018 were negative.  She has yearly clinical breast exams and mammograms. Her most recent screening mammogram in December of 2018 was negative.  Brittney has not had a colonoscopy.  Brittney reports that she has had 2 benign moles removed in 2013 and 2015. She had one severely atypical nevus with spitzoid features removed from her right upper back in 2011 and one mildly dysplastic nevus removed from her right lower chest in 2009. She follows up with dermatology annually.   She does not regularly do any other cancer screening at this time.  Brittney reported no tobacco use, and about 5 glasses of wine  per week.    Family History: (Please see scanned pedigree for detailed family history information)    Brittney has a sister, age 48, who was diagnosed with breast cancer at the age of 42. She opted to undergo a double mastectomy. This sister reportedly underwent genetic testing at the time of her diagnosis for the BRCA1 and BRCA2 genes, which came back negative.      Brittney has one maternal aunt who was diagnosed with breast cancer in her mid 50's and passed away in her mid 50's from her breast cancer.    Brittney has a paternal uncle who had a history of esophageal cancer in his late 70's with a history of smoking.     Her maternal ethnicity is [place] . Her paternal ethnicity is [place] .  There is no known Ashkenazi Judaism ancestry on either side of her family. There is no reported consanguinity.    Discussion:    Brittney's family history of breast cancer is suggestive of a hereditary cancer syndrome.    We reviewed the features of sporadic, familial, and hereditary cancers.        We discussed the natural history and genetics of Hereditary Breast and Ovarian Cancer (HBOC) caused by BRCA1/2 mutations. Women with HBOC have an increased risk for breast and ovarian cancer.  There is also an increased risk for prostate and breast cancer among males with a BRCA1/BRCA2 mutation.  Males and females with BRCA1/BRCA2 mutations also face a slightly increased risk for pancreatic cancer.      A detailed handout regarding HBOC, hereditary breast cancer, and the information we discussed was provided to Brittney at the end of our appointment today and can be found in the after visit summary.  Topics included: inheritance pattern, cancer risks, cancer screening recommendations, and also risks, benefits and limitations of testing.    Based on her personal and family history, Brittney meets current National Comprehensive Cancer Network (NCCN) criteria for genetic testing of BRCA1 and BRCA2.      We discussed that there are additional  genes  associated with an increased risk for breast cancer. As many of the genes associated with an increased risk for breast cancer present with overlapping features in a family and accurate cancer risk cannot always be established based upon the pedigree analysis alone, it would be reasonable for Brittney to consider panel genetic testing to analyze multiple genes at once.  We reviewed options for genetic testing for hereditary breast and gynecologic cancers including actionable (CustomNext-Cancer 19-gene panel; BreastPlus + GynPlus + STK11, NBN, NF1) and expanded (OvaNext, Cancer Next) options. Brittney expressed interested in genetic testing for the actionable genes and opted to proceed with the CustomNext-Cancer 19-gene panel.   The CustomNext-Cancer high/moderate risk breast and gynecologic panel includes the following 19 genes: MARIIA, BRCA1, BRCA2, BRIP1, CDH1, CHEK2, EPCAM, MLH1, MSH2, MSH6, NBN, NF1, PALB2,  PMS2, PTEN, RAD51C, RAD51D, STK11, and TP53.    Some of the genes on this custom panel are associated with specific hereditary cancer syndromes and have published management guidelines:  Hereditary Breast and Ovarian Cancer syndrome (BRCA1, BRCA2), Chaudhry syndrome (EPCAM, MLH1, MSH2, MSH6, PMS2), Hereditary Diffuse Gastric Cancer (CDH1), Cowden Syndrome (PTEN), Peutz-Jeghers syndrome, neurofibromatosis type 1 (NF1), and Li Fraumeni syndrome (TP53).     Risk-reducing salpingo-oophorectomy can be considered in women with mutations in BRIP1, RAD51C, or RAD51D.  MARIIA, CHEK2, NBN, and PALB2 are associated with breast cancer risk and have published screening guidelines.    Consent was obtained and genetic testing for the CustomNext-Cancer 19-gene panel was sent to [facility]. The turnaround time for results is typically 3-4 weeks after insurance preverification.     Medical Management: For Brittney, we reviewed that the information from genetic testing may determine:    additional cancer screening for which Brittney may  qualify (i.e. mammogram and breast MRI, more frequent colonoscopies, more frequent dermatologic exams, etc.),    options for risk reducing surgeries Brittney could consider (i.e. bilateral mastectomy, surgery to remove the ovaries and/or uterus, etc.),      and targeted chemotherapies if she were to develop certain cancers in the future (i.e. immunotherapy for individuals with Chaudhry syndrome, PARP inhibitors, etc.).     These recommendations will be discussed in detail once genetic testing is completed.     Plan:  1) Today Brittney elected to proceed with CustomNext-Cancer19-gene panel through [employer].  2) This information should be available in 3-4 weeks after insurance preverification.  3) Brittney will be contacted to schedule a result disclosure appointment over the phone.    Face to face time: 45 minutes    Sabina Epperson MS  Genetic Counseling Intern      Patient seen, evaluated and discussed with the Genetic Counseling Intern. I have verified the documentation recorded by the scribe, which accurately reflects my assessment of the patient and the plan of care.    Gricelda Harley MS, [facility]  Licensed Genetic Counselor  [phone]              Again, thank you for allowing me to participate in the care of your patient.        Sincerely,        Gricelda Harley, SHEILA

## 2019-01-21 NOTE — PATIENT INSTRUCTIONS
Assessing Cancer Risk  Only about 5-10% of cancers are thought to be due to an inherited cancer susceptibility gene.    These families often have:    Several people with the same or related types of cancer    Cancers diagnosed at a young age (before age 50)    Individuals with more than one primary cancer    Multiple generations of the family affected with cancer    Some people may be candidates for genetic testing of more than one gene.  For these families, genetic testing using a cancer panel may be offered.  These panels will test different genes known to increase the risk for breast, ovarian, uterine, and/or other cancers. All of the genes discussed below have published clinical management guidelines for individuals who are found to carry a mutation. The purpose of this handout is to serve as a brief summary of the genes analyzed by the panels used to inquire about hereditary breast and gynecologic cancer:  MARIIA, BRCA1, BRCA2, BRIP1, CDH1, CHEK2, MLH1, MSH2, MSH6, PMS2, EPCAM, PTEN, PALB2, RAD51C, RAD51D, and TP53.  ______________________________________________________________________________  Hereditary Breast and Ovarian Cancer Syndrome   (BRCA1 and BRCA2)  A single mutation in one of the copies of BRCA1 or BRCA2 increases the risk for breast and ovarian cancer, among others.  The risk for pancreatic cancer and melanoma may also be slightly increased in some families.  The chart below shows the chance that someone with a BRCA mutation would develop cancer in his or her lifetime1,2,3,4.        A person s ethnic background is also important to consider, as individuals of Ashkenazi Jain ancestry have a higher chance of having a BRCA gene mutation.  There are three BRCA mutations that occur more frequently in this population.    Chaudhry Syndrome   (MLH1, MSH2, MSH6, PMS2, and EPCAM)  Currently five genes are known to cause Chaudhry Syndrome: MLH1, MSH2, MSH6, PMS2, and EPCAM.  A single mutation in one of the  Chaudhry Syndrome genes increases the risk for colon, endometrial, ovarian, and stomach cancers.  Other cancers that occur less commonly in Chaudhry Syndrome include urinary tract, skin, and brain cancers.  The chart below shows the chance that a person with Chaudhry syndrome would develop cancer in his or her lifetime5.      *Cancer risk varies depending on Chaudhry syndrome gene found    Cowden Syndrome   (PTEN)  Cowden syndrome is a hereditary condition that increases the risk for breast, thyroid, endometrial, colon, and kidney cancer.  Cowden syndrome is caused by a mutation in the PTEN gene.  A single mutation in one of the copies of PTEN causes Cowden syndrome and increases cancer risk.  The chart below shows the chance that someone with a PTEN mutation would develop cancer in their lifetime6,7.  Other benign features seen in some individuals with Cowden syndrome include benign skin lesions (facial papules, keratoses, lipomas), learning disability, autism, thyroid nodules, colon polyps, and larger head size.      *One recent study found breast cancer risk to be increased to 85%    Li-Fraumeni Syndrome   (TP53)  Li-Fraumeni Syndrome (LFS) is a cancer predisposition syndrome caused by a mutation in the TP53 gene. A single mutation in one of the copies of TP53 increases the risk for multiple cancers. Individuals with LFS are at an increased risk for developing cancer at a young age. The lifetime risk for development of a LFS-associated cancer is 50% by age 30 and 90% by age 60.   Core Cancers: Sarcomas, Breast, Brain, Lung, Leukemias/Lymphomas, Adrenocortical carcinomas  Other Cancers: Gastrointestinal, Thyroid, Skin, Genitourinary    Hereditary Diffuse Gastric Cancer   (CDH1)  Currently, one gene is known to cause hereditary diffuse gastric cancer (HDGC): CDH1.  Individuals with HDGC are at increased risk for diffuse gastric cancer and lobular breast cancer. Of people diagnosed with HDGC, 30-50% have a mutation in the CDH1  gene.  This suggests there are likely other genes that may cause HDGC that have not been identified yet.      Lifetime Cancer Risks    General Population HDGC    Diffuse Gastric  <1% ~80%   Breast 12% 39-52%         Additional Genes  MARIIA  MARIIA is a moderate-risk breast cancer gene. Women who have a mutation in MARIIA can have between a 2-4 fold increased risk for breast cancer compared to the general population8. MARIIA mutations have also been associated with increased risk for pancreatic cancer, however an estimate of this cancer risk is not well understood9. Individuals who inherit two MARIIA mutations have a condition called ataxia-telangiectasia (AT).  This rare autosomal recessive condition affects the nervous system and immune system, and is associated with progressive cerebellar ataxia beginning in childhood.  Individuals with ataxia-telangiectasia often have a weakened immune system and have an increased risk for childhood cancers.    PALB2  Mutations in PALB2 have been shown to increase the risk of breast cancer up to 33-58% in some families; where individuals fall within this risk range is dependent upon family [id number]. PALB2 mutations have also been associated with increased risk for pancreatic cancer, although this risk has not been quantified yet.  Individuals who inherit two PALB2 mutations--one from their mother and one from their father--have a condition called Fanconi Anemia.  This rare autosomal recessive condition is associated with short stature, developmental delay, bone marrow failure, and increased risk for childhood cancers.    CHEK2   CHEK2 is a moderate-risk breast cancer gene.  Women who have a mutation in CHEK2 have around a 2-fold increased risk for breast cancer compared to the general population, and this risk may be higher depending upon family history.11,12,13 Mutations in CHEK2 have also been shown to increase the risk of a number of other cancers, including colon and prostate, however  these cancer risks are currently not well understood.    BRIP1, RAD51C and RAD51D  Mutations in BRIP1, RAD51C, and RAD51D have been shown to increase the risk of ovarian cancer and possibly female breast cancer as well14,15 .       Lifetime Cancer Risk    General Population BRIP1 RAD51C RAD51D   Ovarian 1-2% ~5-8% ~5-9% ~7-15%           Inheritance  All of the cancer syndromes reviewed above are inherited in an autosomal dominant pattern.  This means that if a parent has a mutation, each of his or her children will have a 50% chance of inheriting that same mutation.  Therefore, each child--male or female--would have a 50% chance of being at increased risk for developing cancer.      Image obtained from Genetics Home Reference, 2013     Mutations in some genes can occur de fernie, which means that a person s mutation occurred for the first time in them and was not inherited from a parent.  Now that they have the mutation, however, it can be passed on to future generations.    Genetic Testing  Genetic testing involves a blood test and will look at the genetic information in the MARIIA, BRCA1, BRCA2, BRIP1, CDH1, CHEK2, MLH1, MSH2, MSH6, PMS2, EPCAM, PTEN, PALB2, RAD51C, RAD51D, and TP53 genes for any harmful mutations that are associated with increased cancer risk.  If possible, it is recommended that the person(s) who has had cancer be tested before other family members.  That person will give us the most useful information about whether or not a specific gene is associated with the cancer in the family.    Results  There are three possible results of genetic testing:    Positive--a harmful mutation was identified in one or more of the genes    Negative--no mutation was identified in any of the genes on this panel    Variant of unknown significance--a variation in one of the genes was identified, but it is unclear how this impacts cancer risk in the family    Advantages and Disadvantages   There are advantages and  disadvantages to genetic testing.    Advantages    May clarify your cancer risk    Can help you make medical decisions    May explain the cancers in your family    May give useful information to your family members (if you share your results)    Disadvantages    Possible negative emotional impact of learning about inherited cancer risk    Uncertainty in interpreting a negative test result in some situations    Possible genetic discrimination concerns (see below)    Genetic Information Nondiscrimination Act (BRIAN)  BRIAN is a federal law that protects individuals from health insurance or employment discrimination based on a genetic test result alone.  Although rare, there are currently no legal discrimination protections in terms of life insurance, long term care, or disability insurances.  Visit the [employer] [place] website to learn more.    Reducing Cancer Risk  All of the genes described above have nationally recognized cancer screening guidelines that would be recommended for individuals who test positive.  In addition to increased cancer screening, surgeries may be offered or recommended to reduce cancer risk.  Recommendations are based upon an individual s genetic test result as well as their personal and family history of cancer.    Questions to Think About Regarding Genetic Testing:    What effect will the test result have on me and my relationship with my family members if I have an inherited gene mutation?  If I don t have a gene mutation?    Should I share my test results, and how will my family react to this news, which may also affect them?    Are my children ready to learn new information that may one day affect their own health?    Hereditary Cancer Resources    FORCE: Facing Our Risk of Cancer Empowered facingourrisk.org   Bright Pink bebrightpink.org   Li-Fraumeni Syndrome Association lfsassociation.org   PTEN World PTENworld.com   No stomach for cancer, Inc.  nostomachforcancer.org   Stomach cancer relief network Scrnet.org   Collaborative Group of the Americas on Inherited Colorectal Cancer (CGA) cgaicc.com    Cancer Care cancercare.org   American Cancer Society (ACS) cancer.org   National Cancer Jewell (NCI) cancer.gov     Please call us if you have any questions or concerns.   Cancer Risk Management Program [phone] ([phone])  ? Elda Ronquillo, MS, [facility]  [phone]  ? Minal Lewis, MS, [facility]  [phone]  ? Gricelda Harley, MS, [facility]  [phone]  ? Ja Latham, MS, [facility]  [phone]  ? Ellie Carlo, MS, [facility] [phone]    References  1. Emily MCKEON, Maria Luisa PDP, Jos S, Damion NICHOLAS, Jarett JE, Fabian JL, Cynthia N, Neel H, Francisco O, Ember A, Bianca B, Michelle P, Fermín S, Kwadwo DM, Armando N, Piyush E, Juan H, Gabriel E, Lubinski J, Gronwald J, Marialuisa B, Tulinius H, Thorlacius S, Eerola H, Kahlil H, Luis K, Sydnie OP. Average risks of breast and ovarian cancer associated with BRCA1 or BRCA2 mutations detected in case series unselected for family history: a combined analysis of 222 studies. Am J Hum Suyapa. 2003;72:1117-30.  2. Erik N, Sugar M, Edgard G.  BRCA1 and BRCA2 Hereditary Breast and Ovarian Cancer. Gene Reviews online. 2013.  3. Gareth YC, Alessandra S, Valente G, Moreno S. Breast cancer risk among male BRCA1 and BRCA2 mutation carriers. J Natl Cancer Inst. 2007;99:1811-4.  4. Betito GONZALEZ, Giulia I, Og J, Feliciano E, aNncy ER, Annabelle F. Risk of breast cancer in male BRCA2 carriers. J Med Suyapa. 2010;47:710-1.  5. National Comprehensive Cancer Network. Clinical practice guidelines in oncology, colorectal cancer screening. Available online (registration required). 2015.  6. Aston HOUSE, Estela J, Vishnu J, Ethan LA, Jonathan MS, Eng C. Lifetime cancer risks in individuals with germline PTEN mutations. Clin Cancer Res. 2012;18:400-7.  7. Yrn BORRERO. Cowden Syndrome: A Critical Review of the Clinical Literature. J Suyapa .  2009:18:13-27.  8. Maria Guadalupe A, Gunnar D, Beata S, Jessica P, Glenroy T, Feroz M, Derik B, Jodie H, Maine R, Osbaldo K, Eva L, Betito DG, Kwadwo D, Joe DF, Christiane MR, The Breast Cancer Susceptibility Collaboration () & Samara ROMANO. MARIIA mutations that cause ataxia-telangiectasia are breast cancer susceptibility alleles. Nature Genetics. 2006;38:873-875  9. Rafael N , Joey Y, Dayan J, Jerry L, Sarah GM , Magalys ML, Gallinger S, Goldstein AG, Syngal S, Amor ML, Summer J , Matt R, Noah SZ, Francisco Javier JR, Linh VE, Daksha M, Vojulián B, Leah N, Craig RH, Yaw KW, and Young AP. MARIIA mutations in patients with hereditary pancreatic cancer. Cancer Discover. 2012;2:41-46  10. Emily ESTRADA, et al. Breast-Cancer Risk in Families with Mutations in PALB2. NEJM. 2014; 371(6):497-506.  11. CHEK2 Breast Cancer Case-Control Consortium. CHEK2*1100delC and susceptibility to breast cancer: A collaborative analysis involving 10,860 breast cancer cases and 9,065 controls from 10 studies. Am J Hum Suyapa, 74 (2004), pp. 6874-4402  12. Nina T, Thaddeus S, Ileana K, et al. Spectrum of Mutations in BRCA1, BRCA2, CHEK2, and TP53 in Families at High Risk of Breast Cancer. UVALDO. 2006;295(12):3064-3534.   13. Fady C, Tom D, Nolvia A, et al. Risk of breast cancer in women with a CHEK2 mutation with and without a family history of breast cancer. J Clin Oncol. 2011;29:6916-0995.  14. Audie LENZ, Sandhya E, Jameel SJ, et al. Contribution of germline mutations in the RAD51B, RAD51C, and RAD51D genes to ovarian cancer in the population. J Clin Oncol. 2015;33(26):5406-6100. Doi:10.1200/JCO.2015.61.2408.  15. Marycarmen T, Nayan RANDOLPH, George P, et al. Mutations in BRIP1 confer high risk of ovarian cancer. Maggie Suyapa. 2011;43(11):6430-2571. doi:10.1038/ng.955.

## 2019-01-21 NOTE — LETTER
Cancer Risk Management  Program Locations    [facility]  Mailing Address  Cancer Risk Management Program  [facility]  [address]  [place] [address]    New patient appointments  [phone]  January 22, 2019    Brittney Morris  [address]  [place] [facility] [address]      Dear Brittney,    It was a pleasure meeting with you at the [facility] on 1/21/2019.  Here is a copy of the progress note from your recent genetic counseling visit to the Cancer Risk Management Program.  If you have any additional questions, please feel free to call.    Referring Provider: Naomi Lewis MD    Presenting Information:   I met with Brittney Morris today for genetic counseling at the Cancer Risk Management Program at the [facility] to discuss her family history of breast cancer.  She is here today to review this history, cancer screening recommendations, and available genetic testing options.    Personal History:  Brittney is a 43 year old female.  She had a diagnostic imaging of the right breast only in June 2018. This led to biopsy of two lesions, one radial scar and one benign papilloma.  Brittney underwent an excisional biopsy of the radial scar, pathology was benign. Brittney  does not have any personal history of cancer.      She had her first menstrual period at age 12, her first child at age 38, and still has regular periods.  Brittney has her ovaries, fallopian tubes and uterus in place, and she has had no ovarian cancer screening to date.  She reports a 3-4 year history of oral contraceptive use and that she has no history of hormone replacement therapy.      Her most recent OB-GYN exam and Pap smear in 2018 were negative.  She has yearly clinical breast exams and mammograms. Her most recent  screening mammogram in December of 2018 was negative.  Brittney has not had a colonoscopy.  Brittney reports that she has had 2 benign moles removed in 2013 and 2015. She had one severely atypical nevus with spitzoid features removed from her right upper back in 2011 and one mildly dysplastic nevus removed from her right lower chest in 2009. She follows up with dermatology annually.   She does not regularly do any other cancer screening at this time.  Brittney reported no tobacco use, and about 5 glasses of wine per week.    Family History: (Please see scanned pedigree for detailed family history information)    Brittney has a sister, age 48, who was diagnosed with breast cancer at the age of 42. She opted to undergo a double mastectomy. This sister reportedly underwent genetic testing at the time of her diagnosis for the BRCA1 and BRCA2 genes, which came back negative.      Brittney has one maternal aunt who was diagnosed with breast cancer in her mid 50's and passed away in her mid 50's from her breast cancer.    Brittney has a paternal uncle who had a history of esophageal cancer in his late 70's with a history of smoking.     Her maternal ethnicity is [place] . Her paternal ethnicity is [place] .  There is no known Ashkenazi Voodoo ancestry on either side of her family. There is no reported consanguinity.    Discussion:    Brittney's family history of breast cancer is suggestive of a hereditary cancer syndrome.    We reviewed the features of sporadic, familial, and hereditary cancers.        We discussed the natural history and genetics of Hereditary Breast and Ovarian Cancer (HBOC) caused by BRCA1/2 mutations. Based on her personal and family history, Brittney meets current National Comprehensive Cancer Network (NCCN) criteria for genetic testing of BRCA1 and BRCA2.      We discussed that there are additional genes that are associated with an increased risk for breast cancer.  A detailed handout regarding HBOC, hereditary  breast cancer, and the information we discussed was provided to Brittney at the end of our appointment today and can be found in the after visit summary.  Topics included: inheritance pattern, cancer risks, cancer screening recommendations, and also risks, benefits and limitations of testing.    As many of the genes associated with an increased risk for breast cancer present with overlapping features in a family and accurate cancer risk cannot always be established based upon the pedigree analysis alone, it would be reasonable for Brittney to consider panel genetic testing to analyze multiple genes at once.  We reviewed options for genetic testing for hereditary breast and gynecologic cancers including actionable (CustomNext-Cancer 19-gene panel; BreastPlus + GynPlus + STK11, NBN, NF1) and expanded (OvaNext, Cancer Next) options. Brittney expressed interested in genetic testing for the actionable genes and opted to proceed with the CustomNext-Cancer 19-gene panel.   The CustomNext-Cancer high/moderate risk breast and gynecologic panel includes the following 19 genes: MARIIA, BRCA1, BRCA2, BRIP1, CDH1, CHEK2, EPCAM, MLH1, MSH2, MSH6, NBN, NF1, PALB2,  PMS2, PTEN, RAD51C, RAD51D, STK11, and TP53.    Some of the genes on this custom panel are associated with specific hereditary cancer syndromes and have published management guidelines:  Hereditary Breast and Ovarian Cancer syndrome (BRCA1, BRCA2), Chaudhry syndrome (EPCAM, MLH1, MSH2, MSH6, PMS2), Hereditary Diffuse Gastric Cancer (CDH1), Cowden Syndrome (PTEN), Peutz-Jeghers syndrome, neurofibromatosis type 1 (NF1), and Li Fraumeni syndrome (TP53).     Risk-reducing salpingo-oophorectomy can be considered in women with mutations in BRIP1, RAD51C, or RAD51D.  MARIIA, CHEK2, NBN, and PALB2 are associated with breast cancer risk and have published screening guidelines.    Consent was obtained and genetic testing for the CustomNext-Cancer 19-gene panel was sent to [facility]. The  turnaround time for results is typically 3-4 weeks after insurance preverification.     Medical Management: For Brittney, we reviewed that the information from genetic testing may determine:    additional cancer screening for which Brittney may qualify (i.e. mammogram and breast MRI, more frequent colonoscopies, more frequent dermatologic exams, etc.),    options for risk reducing surgeries Brittney could consider (i.e. bilateral mastectomy, surgery to remove the ovaries and/or uterus, etc.),      and targeted chemotherapies if she were to develop certain cancers in the future (i.e. immunotherapy for individuals with Chaudhry syndrome, PARP inhibitors, etc.).     These recommendations will be discussed in detail once genetic testing is completed.     Plan:  1) Today Brittney elected to proceed with CustomNext-Cancer through [facility].  2) This information should be available in 3-4 weeks after insurance preverification.  3) Brittney will be contacted to schedule a result disclosure appointment over the phone.    Sabina Epperson MS  Genetic Counseling Intern    Gricelda Harley MS, [facility]  Licensed Genetic Counselor  [phone]

## 2019-01-21 NOTE — LETTER
Cancer Risk Management  Program Locations    [facility]  Mailing Address  Cancer Risk Management Program  [facility]  [address]  [place] [address]    New patient appointments  [phone]  January 23, 2019    Brittney Morris  [address]  [facility] [address]      Dear Brittney,    It was a pleasure meeting with you at the [facility] on 1/21/2019.  Here is a copy of the progress note from your recent genetic counseling visit to the Cancer Risk Management Program.  If you have any additional questions, please feel free to call.      Referring Provider: Naomi Lewis MD    Presenting Information:   I met with Brittney Morris today for genetic counseling at the Cancer Risk Management Program at the [facility] [place] to discuss her family history of breast cancer.  She is here today to review this history, cancer screening recommendations, and available genetic testing options.    Personal History:  Brittney is a 43 year old female.  She had a diagnostic imaging of the right breast only in June 2018. This led to biopsy of two lesions, one radial scar and one benign papilloma.  Brittney underwent an excisional biopsy of the radial scar, pathology was benign. Brittney  does not have any personal history of cancer.      She had her first menstrual period at age 12, her first child at age 38, and still has regular periods.  Brittney has her ovaries, fallopian tubes and uterus in place, and she has had no ovarian cancer screening to date.  She reports a 3-4 year history of oral contraceptive use and that she has no history of hormone replacement therapy.      Her most recent OB-GYN exam and Pap smear in 2018 were negative.  She has yearly clinical breast exams and mammograms. Her most  recent screening mammogram in December of 2018 was negative.  Brittney has not had a colonoscopy.  Brittney reports that she has had 2 benign moles removed in 2013 and 2015. She had one severely atypical nevus with spitzoid features removed from her right upper back in 2011 and one mildly dysplastic nevus removed from her right lower chest in 2009. She follows up with dermatology annually.   She does not regularly do any other cancer screening at this time.  Brittney reported no tobacco use, and about 5 glasses of wine per week.    Family History: (Please see scanned pedigree for detailed family history information)    Brittney has a sister, age 48, who was diagnosed with breast cancer at the age of 42. She opted to undergo a double mastectomy. This sister reportedly underwent genetic testing at the time of her diagnosis for the BRCA1 and BRCA2 genes, which came back negative.      Brittney has one maternal aunt who was diagnosed with breast cancer in her mid 50's and passed away in her mid 50's from her breast cancer.    Brittney has a paternal uncle who had a history of esophageal cancer in his late 70's with a history of smoking.     Her maternal ethnicity is [place] . Her paternal ethnicity is [place] .  There is no known Ashkenazi [employer] ancestry on either side of her family. There is no reported consanguinity.    Discussion:    Brittney's family history of breast cancer is suggestive of a hereditary cancer syndrome.    We reviewed the features of sporadic, familial, and hereditary cancers.        We discussed the natural history and genetics of Hereditary Breast and Ovarian Cancer (HBOC) caused by BRCA1/2 mutations. Women with HBOC have an increased risk for breast and ovarian cancer.  There is also an increased risk for prostate and breast cancer among males with a BRCA1/BRCA2 mutation.  Males and females with BRCA1/BRCA2 mutations also face a slightly increased risk for pancreatic cancer.      A detailed handout  regarding HBOC, hereditary breast cancer, and the information we discussed was provided to Brittney at the end of our appointment today and can be found in the after visit summary.  Topics included: inheritance pattern, cancer risks, cancer screening recommendations, and also risks, benefits and limitations of testing.    Based on her personal and family history, Brittney meets current National Comprehensive Cancer Network (NCCN) criteria for genetic testing of BRCA1 and BRCA2.      We discussed that there are additional genes  associated with an increased risk for breast cancer. As many of the genes associated with an increased risk for breast cancer present with overlapping features in a family and accurate cancer risk cannot always be established based upon the pedigree analysis alone, it would be reasonable for Brittney to consider panel genetic testing to analyze multiple genes at once.  We reviewed options for genetic testing for hereditary breast and gynecologic cancers including actionable (CustomNext-Cancer 19-gene panel; BreastPlus + GynPlus + STK11, NBN, NF1) and expanded (OvaNext, Cancer Next) options. Brittney expressed interested in genetic testing for the actionable genes and opted to proceed with the CustomNext-Cancer 19-gene panel.   The CustomNext-Cancer high/moderate risk breast and gynecologic panel includes the following 19 genes: MARIIA, BRCA1, BRCA2, BRIP1, CDH1, CHEK2, EPCAM, MLH1, MSH2, MSH6, NBN, NF1, PALB2,  PMS2, PTEN, RAD51C, RAD51D, STK11, and TP53.    Some of the genes on this custom panel are associated with specific hereditary cancer syndromes and have published management guidelines:  Hereditary Breast and Ovarian Cancer syndrome (BRCA1, BRCA2), Chaudhry syndrome (EPCAM, MLH1, MSH2, MSH6, PMS2), Hereditary Diffuse Gastric Cancer (CDH1), Cowden Syndrome (PTEN), Peutz-Jeghers syndrome, neurofibromatosis type 1 (NF1), and Li Fraumeni syndrome (TP53).     Risk-reducing salpingo-oophorectomy can be considered  in women with mutations in BRIP1, RAD51C, or RAD51D.  MARIIA, CHEK2, NBN, and PALB2 are associated with breast cancer risk and have published screening guidelines.    Consent was obtained and genetic testing for the CustomNext-Cancer 19-gene panel was sent to [facility]. The turnaround time for results is typically 3-4 weeks after insurance preverification.     Medical Management: For Brittney, we reviewed that the information from genetic testing may determine:    additional cancer screening for which Brittney may qualify (i.e. mammogram and breast MRI, more frequent colonoscopies, more frequent dermatologic exams, etc.),    options for risk reducing surgeries Brittney could consider (i.e. bilateral mastectomy, surgery to remove the ovaries and/or uterus, etc.),      and targeted chemotherapies if she were to develop certain cancers in the future (i.e. immunotherapy for individuals with Chaudhry syndrome, PARP inhibitors, etc.).     These recommendations will be discussed in detail once genetic testing is completed.     Plan:  1) Today Brittney elected to proceed with CustomNext-Cancer19-gene panel through [facility].  2) This information should be available in 3-4 weeks after insurance preverification.  3) Brittney will be contacted to schedule a result disclosure appointment over the phone.    Face to face time: 45 minutes    Sabina Epperson MS  Genetic Counseling Intern      Patient seen, evaluated and discussed with the Genetic Counseling Intern. I have verified the documentation recorded by the scribe, which accurately reflects my assessment of the patient and the plan of care.    Gricelda Harley MS, [facility]  Licensed Genetic Counselor  [phone]

## 2019-01-29 ENCOUNTER — TELEPHONE (OUTPATIENT)
Dept: ONCOLOGY | Facility: CLINIC | Age: 44
End: 2019-01-29

## 2019-01-29 NOTE — TELEPHONE ENCOUNTER
Message left for patient to schedule Return visit with Sabina Epperson for Genetice Test Results. **Schedulers Please see staff messages for days and times and will have to manually enter the appointment.**

## 2019-02-06 LAB — LAB SCANNED RESULT: NORMAL

## 2019-02-18 ENCOUNTER — TELEPHONE (OUTPATIENT)
Dept: ONCOLOGY | Facility: CLINIC | Age: 44
End: 2019-02-18

## 2019-02-18 DIAGNOSIS — Z80.3 FAMILY HISTORY OF MALIGNANT NEOPLASM OF BREAST: Primary | ICD-10-CM

## 2019-02-18 NOTE — LETTER
Cancer Risk Management  Program [facility] Cancer [facility]  [facility]  Mailing Address  Cancer Risk Management Program  [facility]  [address]  [facility] [address]  [place] [address]    New patient appointments  [phone]  February 18, 2019    Brittney Morris  [address]  [place] [facility] [address]      Dear Brittney,    It was a pleasure speaking with you on the phone on 2/18/2019.  Here is a copy of the progress note from our discussion. If you have any additional questions, please feel free to call.    Referring Provider: Naomi Lewis MD    Presenting Information:  I spoke with Brittney by phone today to discuss her genetic testing results. Her blood was drawn on 01/21/2019.  CustomNext-Cancer (19 genes, a combination of GynPlus and BRCAplus + NBN, STK11, and NF1) testing was ordered from [facility]. This testing was done because of her family history of her sister's breast cancer diagnosed at age 42, and her maternal aunt's breast cancer diagnosed in her 50's.    Genetic Testing Result: NEGATIVE  Brittney is negative for mutations in the MARIIA, BRCA1, BRCA2, BRIP1, CDH1, CHEK2, EPCAM, MLH1, MSH2, MSH6, NBN, NF1, PALB2, PMS2, PTEN, RAD51C, RAD51D, STK11, and TP53 genes.  No mutations were found in any of the 19 genes analyzed. This test involved sequencing and deletion/duplication analysis of all genes with the exception of EPCAM (deletions/duplications only).    Testing did not detect an identifiable mutation associated with Hereditary Breast and Ovarian Cancer syndrome (BRCA1, BRCA2), Hereditary Diffuse Gastric Cancer (CDH1), Chaudhry syndrome (EPCAM, MLH1, MSH2, MSH6, PMS2), Li Fraumeni syndrome (TP53), Peutz-Jeghers syndrome (STK11), Neurofibromatosis type 1 (NF1), or Cowden syndrome (PTEN).    A copy of the test report can be  "found in the Laboratory tab, dated 1/21/2019, and named \"[facility]\". The report is scanned in as a linked document.    Interpretation:  We discussed several different interpretations of this negative test result.    1. One explanation may be that there is a different gene or combination of genes and environment that are associated with the cancers in Brittney's relatives.   2. Another explanation may be that her sister and/or her maternal aunt did have a mutation in one of these genes and she did not inherit it.  3. There is also a small possibility that there is a mutation in one of these genes and we could not find it with our current testing methods.       Screening:  Based on this negative test result, it is important for Brittney and her relatives to refer back to the family history for appropriate cancer screening.      Based on her personal and family history, Brittney has a 28.9-32.8% lifetime risk of developing breast cancer based on the Bulmaro and JACOB (v8) models, respectively. As such, Brittney meets current National Comprehensive Cancer Network (NCCN) guidelines for high risk breast screening. This includes annual breast MRI in addition to annual mammogram beginning at age 32 in Brittney's family.  In addition, Brittney should be receiving clinical breast exams by her physician. We discussed that Brittney could participate in our Cancer Risk Management Program in which our clinical nurse specialist provides an individual screening plan and assists with medical management. A referral was made to see KUSH Ruiz, for this service. Brittney is interested in meeting with Imani. A referral will be placed , and Brittney will be contacted to set up an appointment.    Brittney's sister who has not had breast cancer also meets current NCCN guidelines for high risk breast screening. She should discuss her family history with her healthcare providers to ensure she is getting the appropriate breast cancer screening given this family " history.     Inheritance:  We reviewed the autosomal dominant inheritance of mutations in these breast cancer genes. We discussed that Brittney cannot/did not pass on an identifiable mutation in these genes to her children based on this test result. Mutations in these genes do not skip generations.      Additional Testing Considerations:  Although Brittney's genetic testing result was negative, other relatives may still carry a gene mutation associated with breast cancer. We discussed that Brittney's sister had negative genetic testing for BRCA1 and BRCA2 mutations. Brittney is unsure whether her sister was tested for mutations in additional genes. We discussed that Brittney's sister should consider more expanded genetic testing if this has not already been completed. If any of Brittney's relatives do pursue genetic testing, Brittney is encouraged to contact me so that we may review the impact of their test results on Brittney.    Summary:  We do not have an explanation for Brittney's sister and maternal aunt's breast cancers. Because of that, it is important that she continue with cancer screening based on her personal and family history as discussed above.    Genetic testing is rapidly advancing, and new cancer susceptibility genes will most likely be identified in the future. Therefore, I encouraged Brittney to contact me annually or if there are changes in her personal or family history. This may change how we assess her cancer risk, screening, and the testing we would offer.    Plan:  1. A copy of her test results and a handout summarizing negative genetic test results will be sent to Brittney in the mail.  2. I placed a referral to for Brittney to meet with KUSH Ruiz CNS to discuss high-risk breast cancer screening.   3. She should contact me annually, or sooner if her family history changes.    If Brittney has any further questions, I encouraged her to contact me at [phone].    Time spent counseling: 15 minutes    Sabina Epperson MS, [facility]  Genetic  Counseling Intern  [phone]

## 2019-02-18 NOTE — TELEPHONE ENCOUNTER
"2/18/2019    Referring Provider: Naomi Lewis MD    Presenting Information:  I spoke with Brittney by phone today to discuss her genetic testing results. Her blood was drawn on 01/21/2019.  CustomNext-Cancer (19 genes, a combination of GynPlus and BRCAplus + NBN, STK11, and NF1) testing was ordered from [facility]. This testing was done because of her family history of her sister's breast cancer diagnosed at age 42, and her maternal aunt's breast cancer diagnosed in her 50's.    Genetic Testing Result: NEGATIVE  Brittney is negative for mutations in the MARIIA, BRCA1, BRCA2, BRIP1, CDH1, CHEK2, EPCAM, MLH1, MSH2, MSH6, NBN, NF1, PALB2, PMS2, PTEN, RAD51C, RAD51D, STK11, and TP53 genes.  No mutations were found in any of the 19 genes analyzed. This test involved sequencing and deletion/duplication analysis of all genes with the exception of EPCAM (deletions/duplications only).    Testing did not detect an identifiable mutation associated with Hereditary Breast and Ovarian Cancer syndrome (BRCA1, BRCA2), Hereditary Diffuse Gastric Cancer (CDH1), Chaduhry syndrome (EPCAM, MLH1, MSH2, MSH6, PMS2), Li Fraumeni syndrome (TP53), Peutz-Jeghers syndrome (STK11), Neurofibromatosis type 1 (NF1), or Cowden syndrome (PTEN).    A copy of the test report can be found in the Laboratory tab, dated 1/21/2019, and named \"SEND OUTS MISC TEST\". The report is scanned in as a linked document.    Interpretation:  We discussed several different interpretations of this negative test result.    1. One explanation may be that there is a different gene or combination of genes and environment that are associated with the cancers in Brittney's relatives.   2. Another explanation may be that her sister and/or her maternal aunt did have a mutation in one of these genes and she did not inherit it.  3. There is also a small possibility that there is a mutation in one of these genes and we could not find it with our current testing methods.   "     Screening:  Based on this negative test result, it is important for Brittney and her relatives to refer back to the family history for appropriate cancer screening.      Based on her personal and family history, Brittney has a 28.9-32.8% lifetime risk of developing breast cancer based on the Bulmaro and JACOB (v8) models, respectively. As such, Brittney meets current National Comprehensive Cancer Network (NCCN) guidelines for high risk breast screening. This includes annual breast MRI in addition to annual mammogram beginning at age 32 in Brittney's family.  In addition, Brittney should be receiving clinical breast exams by her physician. We discussed that Brittney could participate in our Cancer Risk Management Program in which our clinical nurse specialist provides an individual screening plan and assists with medical management. A referral was made to see KUSH Ruiz, for this service. Brittney is interested in meeting with Imani. I will place a referral, and Brittney should be contacted to set up an appointment.    Brittney's sister who has not had breast cancer also meets current NCCN guidelines for high risk breast screening. She should discuss her family history with her healthcare providers to ensure she is getting the appropriate breast cancer screening given this family history.     Inheritance:  We reviewed the autosomal dominant inheritance of mutations in these breast cancer genes. We discussed that Brittney cannot/did not pass on an identifiable mutation in these genes to her children based on this test result. Mutations in these genes do not skip generations.      Additional Testing Considerations:  Although Brittney's genetic testing result was negative, other relatives may still carry a gene mutation associated with breast cancer. We discussed that Brittney's sister had negative genetic testing for BRCA1 and BRCA2 mutations. Brittney is unsure whether her sister was tested for mutations in additional genes. We discussed that Brittney's  sister should consider more expanded genetic testing if this has not already been completed. If any of Brittney's relatives do pursue genetic testing, Brittney is encouraged to contact me so that we may review the impact of their test results on Brittney.    Summary:  We do not have an explanation for Brittney's sister and maternal aunt's breast cancers. Because of that, it is important that she continue with cancer screening based on her personal and family history as discussed above.    Genetic testing is rapidly advancing, and new cancer susceptibility genes will most likely be identified in the future. Therefore, I encouraged Brittney to contact me annually or if there are changes in her personal or family history. This may change how we assess her cancer risk, screening, and the testing we would offer.    Plan:  1. A copy of her test results and a handout summarizing negative genetic test results will be sent to Brittney in the mail.  2. I placed a referral to KUSH Ruiz CNS to discuss high-risk breast cancer screening.   3. She should contact me annually, or sooner if her family history changes.    If Brittney has any further questions, I encouraged her to contact me at [phone].    Time spent counseling: 15 minutes    Sabina Epperson MS, [facility]  Genetic Counseling Intern  [phone]    Attestation: Patient was seen, evaluated and discussed with the Genetic Counseling Intern. I have verified the content of the note, which accurately reflects my assessment of the patient and the plan of care.    Supervising Genetic Counselor  Gricelda Harley MS, [facility]

## 2019-03-13 ENCOUNTER — TELEPHONE (OUTPATIENT)
Dept: ONCOLOGY | Facility: CLINIC | Age: 44
End: 2019-03-13

## 2019-03-13 NOTE — TELEPHONE ENCOUNTER
Left voicemail message for patient requesting a return call regarding scheduling appointment with Imani Gallagher.

## 2019-04-03 NOTE — TELEPHONE ENCOUNTER
RECORDS STATUS - BREAST    RECORDS REQUESTED FROM: Internal   DATE REQUESTED: 4.3.19   NOTES DETAILS STATUS   OFFICE NOTE from referring provider Internal 1.21.19, Onc Visit, Kne   OFFICE NOTE from medical oncologist     OFFICE NOTE from surgeon     OFFICE NOTE from radiation oncologist     DISCHARGE SUMMARY from hospital     DISCHARGE REPORT from the ER     OPERATIVE REPORT     MEDICATION LIST     CLINICAL TRIAL TREATMENTS TO DATE     LABS     PATHOLOGY REPORTS  (Tissue diagnosis, Stage, ER/UT percentage positive and intensity of staining, HER2 IHC, FISH, and all biopsies from breast and any distant metastasis)                 Internal 8.22.18, Surgical Path, R Breast     GENONOMIC TESTING     TYPE:   (Next Generation Sequencing, including Foundation One testing, and Oncotype score)     IMAGING (NEED IMAGES & REPORT)     CT SCANS     MRI     MAMMO     ULTRASOUND     PET     BONE SCAN     BRAIN MRI

## 2019-04-09 NOTE — PROGRESS NOTES
Oncology Risk Management Consultation:  Date on this visit: 4/11/2019    Britntey Morris  is referred by Sabina Lobo, Certified Genetic Counselor,  for an oncology risk management consultation. She requires evaluation for her risk of cancer secondary to having a family history of breast cancer in her sister at age 42, a maternal aunt in her 50s,  She is considered to at high risk for breast cancer and has a 28.9%  lifetime risk for breast cancer by the Bulmaro model and a 42.9% lifetime risk by the JACOB model. Her 5 year risk is 4.2% by the JACOB model.    Primary Physician:  Johana Agrawal MD    History Of Present Illness:  Ms. Morris is a very pleasant, healthy 43 year old female who presents with family history of breast cancer.    Pertinent history:  Menarche at 12.  First child at 38.  12/3/2012 - Partial molar pregnancy.  Premenopausal.  Ovaries, fallopian tubes and uterus intact.  LMP: 4/4/2019, 28 days apart, no changes except they are heavier, still last about 3 days.  Breast Density: heterogeneously dense.  Hx of OCP use x3-4 years.  Vasectomy for birth control.  No hx of HRT.  6/7/2018: Right breast core biopsies x2:    Site A: 1230 position, 2 cm from nipple measuring 0.6 cm biopsied.  An X shaped clip was placed   Site B; 0.6 cm ill-defined hypoechoic lesion at 12:00, 4 cm from the nipple was biopsied -Q shaped clip was placed   Percutaneous core needle biopsy, right:    A.  Benign intraductal papilloma  B: Fragments of a radial scar (complex sclerosing lesion (with associated calcifications.  There is no evidence of invasive malignancy on this biopsy specimen.  Imaging reviewed with Dr. Santiago and Dr. Parisi who agreed the pathologic findings are concordant with radiologic findings.    8/22/2018 - Excisional biopsy, right breast: no evidence of residual radial scar, fibrocystic changes with usual ductal hyperplasia and microcalcifications. Negative for in situ or invasive carcinoma.  No hx of  atypia or malignancy.     Genetic testin2019 NEGATIVE for mutations in the MARIIA, BRCA1, BRCA2, BRIP1, CDH1, CHEK2, EPCAM, MLH1, MSH2, MSH6, NBN, NF1, PALB2, PMS2, PTEN, RAD51C, RAD51D, STK11, and TP53 genes using a Custom Next-Cancer (19 genes, a combination of Gyn Plus and BRCA plus + NBN, STK11, and NF1) panel  from [employer].  No mutations were found in any of the 19 genes analyzed. This test involved sequencing and deletion/duplication analysis of all genes with the exception of EPCAM (deletions/duplications only). This testing was done because of her family history of her sister's breast cancer diagnosed at age 42, and her maternal aunt's breast cancer diagnosed in her 50's.     Pertinent screening history:  2014 right breast ultrasound for palpable right axillary lump. Patient is currently breast-feeding.  BI-RADS Category 1.      2015: Baseline screening mammogram, BI-RADS 1.    10/11/2016: Screening Tomosynthesis mammogram, BI-RADS 1  .  10/12/2017: Screening Tomosynthesis mammogram, BI-RADS 1.  2018 -- Digital mammogram diagnostic unilateral tomosynthesis and Ultrasound of the right medial breast:  An oval hypoechoic solid-appearing mass is present at the 12 o'clock position 4 cm from the nipple measuring 0.6 cm.  A round hypoechoic mass with through transmission is present at the 1230 position, 2 cm from the nipple measuring 0.6 cm which may represent a complicated cyst.  Scattered tiny benign cysts are noted in the medial right breast. BI-RADS category: 4 suspicious -ultrasound-guided biopsy recommended.    At this visit, she checks her breasts occasionally and knows they are dense and fibrous; she denies new fatigue, breast pain, asymmetry, lumps, masses, thickening, nipple discharge and skin changes in her breasts.    Past Medical/Surgical History:  Past Medical History:   Diagnosis Date     Anxiety      Missed ab      Seizures (H) 1985    Seizure as a child. Took meds for 2  years.     UTI (urinary tract infection)      Past Surgical History:   Procedure Laterality Date     BIOPSY BREAST SEED LOCALIZATION Right 8/22/2018    Procedure: BIOPSY BREAST SEED LOCALIZATION;  RIGHT SEED LOCALIZED BREAST BIOPSY ;  Surgeon: Naomi Lewis MD;  Location: [facility]     DILATION AND CURETTAGE SUCTION  12/3/2012    Procedure: DILATION AND CURETTAGE SUCTION;  SUCTION DILATION AND CURETTAGE;  Surgeon: Nargis Gordon MD;  Location: [facility]     wisdom teeth[         Allergies:  Allergies as of 04/11/2019     (No Known Allergies)       Current Medications:  Current Outpatient Medications   Medication Sig Dispense Refill     HYDROcodone-acetaminophen (NORCO) 5-325 MG per tablet Take 1-2 tablets by mouth every 4 hours as needed for other (Moderate to Severe Pain) 20 tablet 0     ibuprofen (ADVIL,MOTRIN) 400-800 mg tablet Take 1-2 tablets (400-800 mg) by mouth every 6 hours as needed for other (cramping) 90 tablet 0     senna (SENOKOT) 8.6 MG tablet Take 1 tablet by mouth 2 times daily as needed for constipation 10 tablet 0     SERTRALINE HCL PO Take 50 mg by mouth daily       tamoxifen (NOLVADEX) 10 MG tablet Take 0.5 tablets (5 mg) by mouth daily 60 tablet 0        Family History:  Family History   Problem Relation Age of Onset     Hypertension Mother      Breast Cancer Sister        Social History:  Social History     Socioeconomic History     Marital status:      Spouse name: Not on file     Number of children: Not on file     Years of education: Not on file     Highest education level: Not on file   Occupational History     Employer: [employer]   Social Needs     Financial resource strain: Not on file     Food insecurity:     Worry: Not on file     Inability: Not on file     Transportation needs:     Medical: Not on file     Non-medical: Not on file   Tobacco Use     Smoking status: Never Smoker     Smokeless tobacco: Never Used   Substance and Sexual Activity     Alcohol  use: Yes     Comment: occas     Drug use: No     Sexual activity: Yes     Partners: Male   Lifestyle     Physical activity:     Days per week: Not on file     Minutes per session: Not on file     Stress: Not on file   Relationships     Social connections:     Talks on phone: Not on file     Gets together: Not on file     Attends Jewish service: Not on file     Active member of club or organization: Not on file     Attends meetings of clubs or organizations: Not on file     Relationship status: Not on file     Intimate partner violence:     Fear of current or ex partner: Not on file     Emotionally abused: Not on file     Physically abused: Not on file     Forced sexual activity: Not on file   Other Topics Concern     Parent/sibling w/ CABG, MI or angioplasty before 65F 55M? Not Asked   Social History Narrative     Not on file       Review of Systems:  GENERAL: No change in weight, sleep or appetite.  Normal energy.  No fever or chills  EYES: Negative for vision changes or eye problems  ENT: No problems with ears, nose or throat.  No difficulty swallowing.  RESP: No coughing, wheezing or shortness of breath  CV: No chest pains or palpitations; no issues with blood clots.  GI: No nausea, vomiting,  heartburn, abdominal pain, diarrhea, constipation or change in bowel habits  : No urinary frequency or dysuria, bladder or kidney problems  MUSCULOSKELETAL: No significant muscle or joint pains  Neurologic: POSITIVE for several episodes of syncope, which her neurologist attributes to lack of nutrient balance (B12, Vitamin D); she takes supplements prescribed for her and states she has had no issues.   PSYCHIATRIC: No problems with anxiety, depression or mental health  HEME/IMMUNE/ALLERGY: No history of bleeding or clotting problems or anemia.  No allergies or immune system problems  ENDOCRINE: No history of thyroid disease, diabetes or other endocrine disorders  SKIN: No rashes,worrisome lesions or skin  "problems    Physical Exam:  /82 (BP Location: Left arm, Patient Position: Sitting, Cuff Size: Adult Regular)   Pulse 66   Temp 98.1  F (36.7  C) (Oral)   Resp 18   Ht 1.6 m (5' 3\")   Wt 59.6 kg (131 lb 6.4 oz)   SpO2 100%   BMI 23.28 kg/m        GENERAL APPEARANCE: healthy, alert and no apparent distress     NECK: no adenopathy, no asymmetry or masses     LYMPHATICS: No cervical, supraclavicular, axillary or inguinal lymphadenopathy     RESP: lungs clear to auscultation - no rales, rhonchi or wheezes     BREAST: A multipositional, bilateral breast exam was performed.  Fairly symmetrical breasts. Right breast: well healed, dark red horizontal scar superior to upper outer quadrant, about 4-5 cm long.  No palpable dominant masses, no nipple discharge, no skin changes. Fibrocystic tissue throughout.  Right axilla: no palpable adenopathy. Left breast: no palpable dominant masses, no nipple discharge, no skin changes. Left axilla: no palpable adenopathy. Fibrocystic changes throughout tissue.   CARDIOVASCULAR: regular rate and rhythm, normal S1 S2, no S3 or S4 and no murmur.        SKIN: no suspicious lesions or rashes on anterior torso, upper extremities or face.    Laboratory/Imaging Studies  Results for orders placed or performed in visit on 01/21/19   [facility]: Laboratory Miscellaneous Order   Result Value Ref Range    Miscellaneous Test         Specimen Received, Reordered and sent to Performing laboratory - Report to follow upon   completion.     Send outs misc test   Result Value Ref Range    Lab Scanned Result SEND OUTS MISC TEST-Scanned        ASSESSMENT  We reviewed prevention strategies and surveillance strategies. We reviewed the risks and benefits of low dose tamoxifen, which has been shown to reduce the incidence of DCIS, LCIS and breast cancer by 52% in the SANDY-01 clinical trials. We discussed taking a 5 mg dose daily for a period of 5 years and she was given " printed information about both high dose and low dose tamoxifen. She is a candidate for this as tamoxifen is recommend by NCCN for women who have a 1.7% risk for breast cancer within 5 years; her 5-year risk is 4.2%. She has no risk factors for adverse effects (clotting) and is not planning a pregnancy. She is active, maintains a healthy weight and is a good candidate to try this. She is amenable to trying low dose tamoxifen; a prescription has been sent to [facility] mail order pharmacy for this.     We discussed signs and symptoms to be watchful for in between surveillance visits. She verbalized understanding of signs and symptoms to address with her health care providers including lumps, thickening, swelling, tenderness, nipple discharge or changes in skin of breasts.    We also discussed following  a healthy lifestyle plan recommended by both NCCN and the American Cancer Society that can reduce the risk of breast cancer. She agreed to the following recommendations:  1 Limit alcohol consumption to less than 1 drink per day (1 drink=5 oz.wine, 12 oz. Beer or 1.5 oz. 80-proof liquor). She is well within this guideline.  2. Exercise per American Cancer Society guidelines of at least 150 minutes of moderate-intensity activity or 75 minutes of vigorous activity each week. (Or a combination of both.) Exercise should be spread  out over the week. Regular recreational exercise has been shown to reduce the risk of cancer by 30%. She is very active, running and working out several days per week.  3. Maintain a healthy weight with a Body Mass Index between 19-24.9. A postmenopausal BMI of 30.7 has been shown to have increased the risk for breast cancer by 37% in some studies. Her BMI is 23.2, fully clothed.  4. Do not use tobacco products and limit exposure to passive smoke. She does not smoke.   5. Breastfeed if possible. Research shows that 16+ months of breastfeeding, over a lifetime, can reduce a woman's risk of breast cancer  by up to 27%. She  about 4 months total.    INDIVIDUALIZED CANCER RISK MANAGEMENT PLAN:  Individualized Surveillance Plan for women  With 20% or greater lifetime risk of breast cancer   Per NCCN Breast Cancer Screening and Diagnosis Guidelines Version 2.2018    Recommended screening Test or procedure Last done Next Scheduled    Clinical encounter Ongoing risk assessment, risk reduction counseling and clinical breast exam, every 6-12 months. Refer to genetic counseling if not already done.   4/11/2019 October 2019   However, some family histories with breast cancers at a very young age, may warrant screening starting earlier.    *May begin at age 40 if breast cancers in the family occur at later ages.    Annual mammogram beginning 10 years younger than the earliest breast cancer in the family but not prior to age 30.    Recommend annual breast MRI to begin 10 years younger than the earliest breast cancer in the family but not prior to age 25.    Breast MRIs are preferably done on day 7-15 of the menstrual cycle in premenopausal women. 6/6/2018 -- Digital mammogram diagnostic unilateral tomosynthesis and Ultrasound of the right medial breast:  An oval hypoechoic solid-appearing mass is present at the 12 o'clock position 4 cm from the nipple measuring 0.6 cm.  A round hypoechoic mass with through transmission is present at the 1230 position, 2 cm from the nipple measuring 0.6 cm which may represent a complicated cyst.  Scattered tiny benign cysts are noted in the medial right breast. BI-RADS category: 4 suspicious -ultrasound-guided biopsy recommended.    No history of breast MRI Breast MRI ordered to be done soon.    Next exam: October followed by screening tomosynthesis mammogram   Breast screening for patients at high risk due to thoracic radiation before 30 years old    Begin 10 years post radiation treatment or age 25.   Annual mammogram beginning 10 years after radiation but not prior to age  30    Annual breast MRI, preferably on day 7-15 of menstrual cycle for premenopausal women.       NA     NA   Women who have a lifetime risk of >20% based on history of LCIS or ADH/ALH Annual screening mammogram beginning at age of LCIS or ADH/ALH but not prior to age 30.    Consider annual MRI to begin at age of diagnosis of LCIS or ADH/ALH but not prior to age 25.    Consider risk reducing strategies.     NA     NA    Recommend risk reducing strategies for women with 1.7% 5 year risk of breast cancer. 4.2% 5 year risk by JACOB model      I will be happy to work with her to manage her cancer risk, will follow up on her screenings and see her in the Cancer Risk Management clinic in six months.    I spent 35 minutes with the patient with greater that 50% of it in counseling and coordinating care as documented above.    Imani Gallagher, KUSH-CNS, OCN, AGN-BC  Clinical Nurse Specialist  Cancer Risk Management Program  [address]  [place] [address]    phone:  [phone]  Pager: [phone]  fax: [phone]    CC: MD Naomi Lockett MD

## 2019-04-11 ENCOUNTER — PRE VISIT (OUTPATIENT)
Dept: ONCOLOGY | Facility: CLINIC | Age: 44
End: 2019-04-11

## 2019-04-11 ENCOUNTER — TELEPHONE (OUTPATIENT)
Dept: ONCOLOGY | Facility: CLINIC | Age: 44
End: 2019-04-11

## 2019-04-11 ENCOUNTER — ONCOLOGY VISIT (OUTPATIENT)
Dept: ONCOLOGY | Facility: CLINIC | Age: 44
End: 2019-04-11
Attending: GENETIC COUNSELOR, MS
Payer: COMMERCIAL

## 2019-04-11 VITALS
WEIGHT: 131.4 LBS | SYSTOLIC BLOOD PRESSURE: 122 MMHG | DIASTOLIC BLOOD PRESSURE: 82 MMHG | HEART RATE: 66 BPM | BODY MASS INDEX: 23.28 KG/M2 | TEMPERATURE: 98.1 F | HEIGHT: 63 IN | OXYGEN SATURATION: 100 % | RESPIRATION RATE: 18 BRPM

## 2019-04-11 DIAGNOSIS — Z12.39 BREAST CANCER SCREENING, HIGH RISK PATIENT: Primary | ICD-10-CM

## 2019-04-11 DIAGNOSIS — Z91.89 AT HIGH RISK FOR BREAST CANCER: ICD-10-CM

## 2019-04-11 PROCEDURE — G0463 HOSPITAL OUTPT CLINIC VISIT: HCPCS

## 2019-04-11 PROCEDURE — 99214 OFFICE O/P EST MOD 30 MIN: CPT | Performed by: CLINICAL NURSE SPECIALIST

## 2019-04-11 RX ORDER — TAMOXIFEN CITRATE 10 MG/1
5 TABLET ORAL DAILY
Qty: 60 TABLET | Refills: 0 | Status: SHIPPED | OUTPATIENT
Start: 2019-04-11 | End: 2019-08-22

## 2019-04-11 ASSESSMENT — MIFFLIN-ST. JEOR: SCORE: 1220.16

## 2019-04-11 ASSESSMENT — PAIN SCALES - GENERAL: PAINLEVEL: NO PAIN (0)

## 2019-04-11 NOTE — Clinical Note
4/11/2019         RE: Brittney Morris  [address]  [facility] [address]        Dear Colleague,    Thank you for referring your patient, Brittney Morris, to the CANCER RISK MANAGEMENT PROGRAM. Please see a copy of my visit note below.    Oncology Risk Management Consultation:  Date on this visit: 4/11/2019    Brittney Morris  is referred by Sabina Lobo, Certified Genetic Counselor,  for an oncology risk management consultation. She requires evaluation for her risk of cancer secondary to having a family history of breast cancer in her sister at age 42, a maternal aunt in her 50s,  She is considered to at high risk for breast cancer and has a 28.9%  lifetime risk for breast cancer by the Bulmaro model and a 32.8% lifetime risk by the JACOB model.    Primary Physician: Johana Agrawal     History Of Present Illness:  Ms. Morris is a very pleasant, healthy 43 year old female who presents with family history of breast cancer.    Pertinent history:  Menarche at 12.  First child at 38.  12/3/2012 - Partial molar pregnancy.  Premenopausal.  Ovaries, fallopian tubes and uterus intact.  LMP:  Density: heterogeneously dense.  Hx of OCP use x3-4 years.  No hx of HRT.  6/7/2018:  Right breast fine core biopsies x2:    Site A: 1230 position, 2 cm from nipple measuring 0.6 cm biopsied.  An X shaped clip was placed   Site B; 0.6 cm ill-defined hypoechoic lesion at 12:00, 4 cm from the nipple was biopsied -Q shaped clip was placed   Percutaneous core needle biopsy, right:    A.  Benign intraductal papilloma  B: Fragments of a radial scar (complex sclerosing lesion (with associated calcifications.  There is no evidence of invasive malignancy on this biopsy specimen.  Imaging reviewed with Dr. Santiago and Dr. Parisi who agreed the pathologic findings are concordant with radiologic findings.    8/22/2018 - Excisional biopsy, right breast: no evidence of residual radial scar, fibrocystic changes with usual  ductal hyperplasia and microcalcifications. Negative for in situ or invasive carcinoma.  No hx of atypia or malignancy.     Genetic testin2019 NEGATIVE for mutations in the MARIIA, BRCA1, BRCA2, BRIP1, CDH1, CHEK2, EPCAM, MLH1, MSH2, MSH6, NBN, NF1, PALB2, PMS2, PTEN, RAD51C, RAD51D, STK11, and TP53 genes using a [employer] (19 genes, a combination of GynPlus and BRCAplus + NBN, STK11, and NF1)  panel  from [employer].  No mutations were found in any of the 19 genes analyzed. This test involved sequencing and deletion/duplication analysis of all genes with the exception of EPCAM (deletions/duplications only). This testing was done because of her family history of her sister's breast cancer diagnosed at age 42, and her maternal aunt's breast cancer diagnosed in her 50's.     Pertinent screening history:   right breast ultrasound for palpable right axillary lump. Patient is currently breast-feeding.  BI-RADS Category 1.    2015: Baseline screening mammogram, BI-RADS 1.  10/11/2016: Screening Tomosynthesis mammogram, BI-RADS 1.  10/12/2017: Screening Tomosynthesis mammogram, BI-RADS 1.  2018 -- Digital mammogram diagnostic unilateral tomosynthesis andUltrasound of the right medial breast:  An oval hypoechoic solid-appearing mass is present at the 12 o'clock position 4 cm from the nipple measuring 0.6 cm.  A round hypoechoic mass with through transmission is present at the 1230 position, 2 cm from the nipple measuring 0.6 cm which may represent a complicated cyst.  Scattered tiny benign cysts are noted in the medial right breast. BI-RADS category: 4 suspicious -ultrasound-guided biopsy recommended.     At this visit,  @he reports _________________    Past Medical/Surgical History:  Past Medical History:   Diagnosis Date     Anxiety      Missed ab      Seizures (H) 1985    Seizure as a child. Took meds for 2 years.     UTI (urinary tract infection)      Past Surgical History:    Procedure Laterality Date     BIOPSY BREAST SEED LOCALIZATION Right 8/22/2018    Procedure: BIOPSY BREAST SEED LOCALIZATION;  RIGHT SEED LOCALIZED BREAST BIOPSY ;  Surgeon: Naomi Leiws MD;  Location: [facility]     DILATION AND CURETTAGE SUCTION  12/3/2012    Procedure: DILATION AND CURETTAGE SUCTION;  SUCTION DILATION AND CURETTAGE;  Surgeon: Nargis Gordon MD;  Location: [facility]     wisdom teeth[         Allergies:  Allergies as of 04/11/2019     (No Known Allergies)       Current Medications:  Current Outpatient Medications   Medication Sig Dispense Refill     HYDROcodone-acetaminophen (NORCO) 5-325 MG per tablet Take 1-2 tablets by mouth every 4 hours as needed for other (Moderate to Severe Pain) (Patient not taking: Reported on 9/4/2018) 20 tablet 0     ibuprofen (ADVIL,MOTRIN) 400-800 mg tablet Take 1-2 tablets (400-800 mg) by mouth every 6 hours as needed for other (cramping) 90 tablet 0     senna (SENOKOT) 8.6 MG tablet Take 1 tablet by mouth 2 times daily as needed for constipation (Patient not taking: Reported on 9/4/2018) 10 tablet 0     SERTRALINE HCL PO Take 50 mg by mouth daily          Family History:  Family History   Problem Relation Age of Onset     Hypertension Mother      Breast Cancer Sister        Social History:  Social History     Socioeconomic History     Marital status:      Spouse name: Not on file     Number of children: Not on file     Years of education: Not on file     Highest education level: Not on file   Occupational History     Not on file   Social Needs     Financial resource strain: Not on file     Food insecurity:     Worry: Not on file     Inability: Not on file     Transportation needs:     Medical: Not on file     Non-medical: Not on file   Tobacco Use     Smoking status: Never Smoker     Smokeless tobacco: Never Used   Substance and Sexual Activity     Alcohol use: Yes     Comment: occas     Drug use: No     Sexual activity: Yes     Partners: Male  "  Lifestyle     Physical activity:     Days per week: Not on file     Minutes per session: Not on file     Stress: Not on file   Relationships     Social connections:     Talks on phone: Not on file     Gets together: Not on file     Attends Sabianism service: Not on file     Active member of club or organization: Not on file     Attends meetings of clubs or organizations: Not on file     Relationship status: Not on file     Intimate partner violence:     Fear of current or ex partner: Not on file     Emotionally abused: Not on file     Physically abused: Not on file     Forced sexual activity: Not on file   Other Topics Concern     Parent/sibling w/ CABG, MI or angioplasty before 65F 55M? Not Asked   Social History Narrative     Not on file       Review of Systems:  {ROS Zebra:[id number]::\"GENERAL: No change in weight, sleep or appetite.  Normal energy.  No fever or chills\",\"EYES: Negative for vision changes or eye problems\",\"ENT: No problems with ears, nose or throat.  No difficulty swallowing.\",\"RESP: No coughing, wheezing or shortness of breath\",\"CV: No chest pains or palpitations\",\"GI: No nausea, vomiting,  heartburn, abdominal pain, diarrhea, constipation or change in bowel habits\",\": No urinary frequency or dysuria, bladder or kidney problems\",\"MUSCULOSKELETAL: No significant muscle or joint pains\",\"NEUROLOGIC: No headaches, numbness, tingling, weakness, problems with balance or coordination\",\"PSYCHIATRIC: No problems with anxiety, depression or mental health\",\"HEME/IMMUNE/ALLERGY: No history of bleeding or clotting problems or anemia.  No allergies or immune system problems\",\"ENDOCRINE: No history of thyroid disease, diabetes or other endocrine disorders\",\"SKIN: No rashes,worrisome lesions or skin problems\"}    Physical Exam:  There were no vitals taken for this visit.  {EXAM COMPLETE FEMALE:[id number]::\"  GENERAL APPEARANCE: healthy, alert and no apparent distress, except for the concern of her risk of cancer\",\"  " " BREAST: A multipositional, bilateral breast exam was performed.  Fairly symmetrical -Rsl>L. Right breast: no palpable dominant masses, no nipple discharge, no skin changes. Dense tissue.  Right axilla: no palpable adenopathy. Left breast: no palpable dominant masses, no nipple discharge, no skin changes. Left axilla: no palpable adenopathy. Dense tissue. CARDIOVASCULAR: regular rate and rhythm, normal S1 S2, no S3 or S4 and no murmur. Pulses +1 in all extremities\",\"   ABDOMEN:  soft, nontender, no masses and bowel sounds present in all 4 quadrants\",\"     SKIN: no suspicious lesions or rashes\",\"   HENT: Mouth without ulcers or lesions\",\"   NECK: no adenopathy, no asymmetry or masses\",\"   LYMPHATICS: No cervical, supraclavicular, axillary or inguinal lymphadenopathy\",\"   RESP: lungs clear to auscultation - no rales, rhonchi or wheezes\"}  Laboratory/Imaging Studies  Results for orders placed or performed in visit on 01/21/19   [facility]: Laboratory Miscellaneous Order   Result Value Ref Range    Miscellaneous Test         Specimen Received, Reordered and sent to Performing laboratory - Report to follow upon   completion.     Send outs misc test   Result Value Ref Range    Lab Scanned Result SEND OUTS MISC TEST-Scanned        ASSESSMENT  We discussed signs and symptoms to be watchful for and she practiced palpating with the clinic's breast simulation model. She verbalized understanding of signs and symptoms to address with her physicians (vaginal bleeding, unresolved bloating, pain, tenderness, early satiety) and lumps, thickening, swelling, tenderness, nipple discharge or changes in skin of breasts.    We also discussed following  a healthy lifestyle plan recommended by both NCCN and the American Cancer Society that can reduce the risk of breast cancer. She agreed to the following recommendations:  1 Limit alcohol consumption to less than 1 drink per day (1 drink=5 oz.wine, 12 oz. Beer or " 1.5 oz. 80-proof liquor).  2. Exercise per American Cancer Society guidelines of at least 150 minutes of moderate-intensity activity or 75 minutes of vigorous activity each week. (Or a combination of both.) Exercise should be spread  out over the week. Regular rcreational exercise has been shown to reduce the risk of cancer by 30%.   3. Maintain a healthy weight with a Body Mass Index between 19-24.9. A postmenopausal BMI of 30.7 has been shown to have increased the risk for breast cancer by 37% in some studies.  4. Do not use tobacco products and limit exposure to passive smoke.  5. Breastfeed if possible. Research shows that 16+ months of breastfeeding, over a lifetime, can reduce a woman's risk of breast cancer by up to 27%.  (Data also from: Audi MARS., Violetta BRIONES. Estrogen and the risk of breast cancer. [place] Journal of Medicine 2001; 344:276.)      INDIVIDUALIZED CANCER RISK MANAGEMENT PLAN:  Individualized Surveillance Plan for women  With 20% or greater lifetime risk of breast cancer   Per NCCN Breast Cancer Screening and Diagnosis Guidelines Version 2.2018    Recommended screening Test or procedure Last done Next Scheduled    Clinical encounter Ongoing risk assessment, risk reduction counseling and clinical breast exam, every 6-12 months. Refer to genetic counseling if not already done.   4/11/2019 October 2019   However, some family histories with breast cancers at a very young age, may warrant screening starting earlier.    *May begin at age 40 if breast cancers in the family occur at later ages.    Annual mammogram beginning 10 years younger than the earliest breast cancer in the family but not prior to age 30.    Recommend annual breast MRI to begin 10 years younger than the earliest breast cancer in the family but not prior to age 25.    Breast MRIs are preferably done on day 7-15 of the menstrual cycle in premenopausal women. 6/6/2018 -- Digital mammogram diagnostic unilateral tomosynthesis and  Ultrasound of the right medial breast:  An oval hypoechoic solid-appearing mass is present at the 12 o'clock position 4 cm from the nipple measuring 0.6 cm.  A round hypoechoic mass with through transmission is present at the 1230 position, 2 cm from the nipple measuring 0.6 cm which may represent a complicated cyst.  Scattered tiny benign cysts are noted in the medial right breast. BI-RADS category: 4 suspicious -ultrasound-guided biopsy recommended.    No history of breast MRI Breast MRI ordered to be done soon.    Next exam: October followed by screening tomosynthesis mammogram   Breast screening for patients at high risk due to thoracic radiation before 30 years old    Begin 10 years post radiation treatment or age 25.   Annual mammogram beginning 10 years after radiation but not prior to age 30    Annual breast MRI, preferably on day 7-15 of menstrual cycle for premenopausal women.       NA     NA   Women who have a lifetime risk of >20% based on history of LCIS or ADH/ALH Annual screening mammogram beginning at age of LCIS or ADH/ALH but not prior to age 30.    Consider annual MRI to begin at age of diagnosis of LCIS or ADH/ALH but not prior to age 25.    Consider risk reducing strategies.     NA     NA    Recommend risk reducing strategies for women with 1.7% 5 year risk of breast cancer. 4.2% 5 year risk by JACOB model                  I will be happy to work with her to manage her cancer risk, will follow up on her screenings and see her in the Cancer Risk Management clinic in six months.    I spent xx minutes with the patient with greater that 50% of it in counseling and coordinating care as documented above.                          Again, thank you for allowing me to participate in the care of your patient.        Sincerely,        KUSH Nix CNS

## 2019-04-11 NOTE — LETTER
Cancer Risk Management  Program Locations    [facility]  [facility]  [facility]  [facility]  Mailing Address  Cancer Risk Management Program  [facility]  [address]  [facility] [address]  [place] [address]    New patient appointments  [phone]  April 11, 2019    Brittney Morris  [address]  [place] [address]      Dear Brittney,    It was a pleasure meeting with you today.  Below is a copy of my note from our visit, outlining your surveillance plan.      I look forward to seeing you in the future to coordinate your care and reduce your health risks. Please feel free to contact me if you have any questions or concerns.      Oncology Risk Management Consultation:  Date on this visit: 4/11/2019    Brittney Morris  is referred by Sabina Lobo, Certified Genetic Counselor,  for an oncology risk management consultation. She requires evaluation for her risk of cancer secondary to having a family history of breast cancer in her sister at age 42, a maternal aunt in her 50s,  She is considered to at high risk for breast cancer and has a 28.9%  lifetime risk for breast cancer by the Bulmaro model and a 42.9% lifetime risk by the JACOB model. Her 5 year risk is 4.2% by the JACOB model.    Primary Physician:  Johana Agrawal MD    History Of Present Illness:  Ms. Morris is a very pleasant, healthy 43 year old female who presents with family history of breast cancer.    Pertinent history:  Menarche at 12.  First child at 38.  12/3/2012 - Partial molar pregnancy.  Premenopausal.  Ovaries, fallopian tubes and uterus intact.  LMP: 4/4/2019, 28 days apart, no changes except they are heavier, still last about 3 days.  Breast Density: heterogeneously dense.  Hx of OCP use x3-4 years.  No hx of HRT.  6/7/2018:  Right breast core biopsies x2:    Site A: 1230  position, 2 cm from nipple measuring 0.6 cm biopsied.  An X shaped clip was placed   Site B; 0.6 cm ill-defined hypoechoic lesion at 12:00, 4 cm from the nipple was biopsied -Q shaped clip was placed   Percutaneous core needle biopsy, right:    A.  Benign intraductal papilloma  B: Fragments of a radial scar (complex sclerosing lesion (with associated calcifications.  There is no evidence of invasive malignancy on this biopsy specimen.  Imaging reviewed with Dr. Santiago and Dr. Parisi who agreed the pathologic findings are concordant with radiologic findings.    2018 - Excisional biopsy, right breast: no evidence of residual radial scar, fibrocystic changes with usual ductal hyperplasia and microcalcifications. Negative for in situ or invasive carcinoma.  No hx of atypia or malignancy.     Genetic testin2019 NEGATIVE for mutations in the MARIIA, BRCA1, BRCA2, BRIP1, CDH1, CHEK2, EPCAM, MLH1, MSH2, MSH6, NBN, NF1, PALB2, PMS2, PTEN, RAD51C, RAD51D, STK11, and TP53 genes using a Custom Next-Cancer (19 genes, a combination of Gyn Plus and BRCA plus + NBN, STK11, and NF1)  panel  from [employer].  No mutations were found in any of the 19 genes analyzed. This test involved sequencing and deletion/duplication analysis of all genes with the exception of EPCAM (deletions/duplications only). This testing was done because of her family history of her sister's breast cancer diagnosed at age 42, and her maternal aunt's breast cancer diagnosed in her 50's.     Pertinent screening history:  2014 right breast ultrasound for palpable right axillary lump. Patient is currently breast-feeding.  BI-RADS Category 1.      2015: Baseline screening mammogram, BI-RADS 1.    10/11/2016: Screening Tomosynthesis mammogram, BI-RADS 1  .  10/12/2017: Screening Tomosynthesis mammogram, BI-RADS 1.  2018 -- Digital mammogram diagnostic unilateral tomosynthesis and Ultrasound of the right medial breast:  An oval  hypoechoic solid-appearing mass is present at the 12 o'clock position 4 cm from the nipple measuring 0.6 cm.  A round hypoechoic mass with through transmission is present at the 1230 position, 2 cm from the nipple measuring 0.6 cm which may represent a complicated cyst.  Scattered tiny benign cysts are noted in the medial right breast. BI-RADS category: 4 suspicious -ultrasound-guided biopsy recommended.    At this visit, she checks her breasts occasionally and knows they are dense and fibrous; she denies new fatigue, breast pain, asymmetry, lumps, masses, thickening, nipple discharge and skin changes in her breasts.    Past Medical/Surgical History:  Past Medical History:   Diagnosis Date     Anxiety      Missed ab      Seizures (H) 1985    Seizure as a child. Took meds for 2 years.     UTI (urinary tract infection)      Past Surgical History:   Procedure Laterality Date     BIOPSY BREAST SEED LOCALIZATION Right 8/22/2018    Procedure: BIOPSY BREAST SEED LOCALIZATION;  RIGHT SEED LOCALIZED BREAST BIOPSY ;  Surgeon: Naomi Lewis MD;  Location: [facility]     DILATION AND CURETTAGE SUCTION  12/3/2012    Procedure: DILATION AND CURETTAGE SUCTION;  SUCTION DILATION AND CURETTAGE;  Surgeon: Nargis Gordon MD;  Location: [facility]     wisdom teeth[         Allergies:  Allergies as of 04/11/2019     (No Known Allergies)       Current Medications:  Current Outpatient Medications   Medication Sig Dispense Refill     HYDROcodone-acetaminophen (NORCO) 5-325 MG per tablet Take 1-2 tablets by mouth every 4 hours as needed for other (Moderate to Severe Pain) 20 tablet 0     ibuprofen (ADVIL,MOTRIN) 400-800 mg tablet Take 1-2 tablets (400-800 mg) by mouth every 6 hours as needed for other (cramping) 90 tablet 0     senna (SENOKOT) 8.6 MG tablet Take 1 tablet by mouth 2 times daily as needed for constipation 10 tablet 0     SERTRALINE HCL PO Take 50 mg by mouth daily       tamoxifen (NOLVADEX) 10 MG tablet Take 0.5  tablets (5 mg) by mouth daily 60 tablet 0        Family History:  Family History   Problem Relation Age of Onset     Hypertension Mother      Breast Cancer Sister        Social History:  Social History     Socioeconomic History     Marital status:      Spouse name: Not on file     Number of children: Not on file     Years of education: Not on file     Highest education level: Not on file   Occupational History     Not on file   Social Needs     Financial resource strain: Not on file     Food insecurity:     Worry: Not on file     Inability: Not on file     Transportation needs:     Medical: Not on file     Non-medical: Not on file   Tobacco Use     Smoking status: Never Smoker     Smokeless tobacco: Never Used   Substance and Sexual Activity     Alcohol use: Yes     Comment: occas     Drug use: No     Sexual activity: Yes     Partners: Male   Lifestyle     Physical activity:     Days per week: Not on file     Minutes per session: Not on file     Stress: Not on file   Relationships     Social connections:     Talks on phone: Not on file     Gets together: Not on file     Attends Rastafarian service: Not on file     Active member of club or organization: Not on file     Attends meetings of clubs or organizations: Not on file     Relationship status: Not on file     Intimate partner violence:     Fear of current or ex partner: Not on file     Emotionally abused: Not on file     Physically abused: Not on file     Forced sexual activity: Not on file   Other Topics Concern     Parent/sibling w/ CABG, MI or angioplasty before 65F 55M? Not Asked   Social History Narrative     Not on file       Review of Systems:  GENERAL: No change in weight, sleep or appetite.  Normal energy.  No fever or chills  EYES: Negative for vision changes or eye problems  ENT: No problems with ears, nose or throat.  No difficulty swallowing.  RESP: No coughing, wheezing or shortness of breath  CV: No chest pains or palpitations; no issues with  "blood clots.  GI: No nausea, vomiting,  heartburn, abdominal pain, diarrhea, constipation or change in bowel habits  : No urinary frequency or dysuria, bladder or kidney problems  MUSCULOSKELETAL: No significant muscle or joint pains  Neurologic: POSITIVE for several episodes of syncope, which her neurologist attributes to lack of nutrient balance (B12, Vitamin D); she takes supplements prescribed for her and states she has had no issues.   PSYCHIATRIC: No problems with anxiety, depression or mental health  HEME/IMMUNE/ALLERGY: No history of bleeding or clotting problems or anemia.  No allergies or immune system problems  ENDOCRINE: No history of thyroid disease, diabetes or other endocrine disorders  SKIN: No rashes,worrisome lesions or skin problems    Physical Exam:  /82 (BP Location: Left arm, Patient Position: Sitting, Cuff Size: Adult Regular)   Pulse 66   Temp 98.1  F (36.7  C) (Oral)   Resp 18   Ht 1.6 m (5' 3\")   Wt 59.6 kg (131 lb 6.4 oz)   SpO2 100%   BMI 23.28 kg/m         GENERAL APPEARANCE: healthy, alert and no apparent distress     NECK: no adenopathy, no asymmetry or masses     LYMPHATICS: No cervical, supraclavicular, axillary or inguinal lymphadenopathy     RESP: lungs clear to auscultation - no rales, rhonchi or wheezes     BREAST: A multipositional, bilateral breast exam was performed.  Fairly symmetrical breasts. Right breast: well healed, dark red horizontal scar superior to upper outer quadrant, about 4-5 cm long.  No palpable dominant masses, no nipple discharge, no skin changes. Fibrocystic tissue throughout.  Right axilla: no palpable adenopathy. Left breast: no palpable dominant masses, no nipple discharge, no skin changes. Left axilla: no palpable adenopathy. Fibrocystic changes throughout tissue.   CARDIOVASCULAR: regular rate and rhythm, normal S1 S2, no S3 or S4 and no murmur.        SKIN: no suspicious lesions or rashes on anterior torso, upper extremities or " face.    Laboratory/Imaging Studies  Results for orders placed or performed in visit on 01/21/19   [facility] Laboratory Miscellaneous Order   Result Value Ref Range    Miscellaneous Test         Specimen Received, Reordered and sent to Performing laboratory - Report to follow upon   completion.     Send outs misc test   Result Value Ref Range    Lab Scanned Result SEND OUTS MISC TEST-Scanned        ASSESSMENT  We reviewed prevention strategies and surveillance strategies. We reviewed the risks and benefits of low dose tamoxifen, which has been shown to reduce the incidence of DCIS, LCIS and breast cancer by 52% in the SANDY-01 clinical trials. We discussed taking a 5 mg dose daily for a period of 5 years and she was given printed information about both high dose and low dose tamoxifen. She is a candidate for this as tamoxifen is recommend by NCCN for women who have a 1.7% risk for breast cancer within 5 years; her 5-year risk is 4.2%. She has no risk factors for adverse effects (clotting) and is not planning a pregnancy. She is active, maintains a healthy weight and is a good candidate to try this. She is amenable to trying low dose tamoxifen; a prescription has been sent to [facility] mail order pharmacy for this.     We discussed signs and symptoms to be watchful for in between surveillance visits. She verbalized understanding of signs and symptoms to address with her health care providers including lumps, thickening, swelling, tenderness, nipple discharge or changes in skin of breasts.    We also discussed following  a healthy lifestyle plan recommended by both NCCN and the American Cancer Society that can reduce the risk of breast cancer. She agreed to the following recommendations:  1 Limit alcohol consumption to less than 1 drink per day (1 drink=5 oz.wine, 12 oz. Beer or 1.5 oz. 80-proof liquor). She is well within this guideline.  2. Exercise per American Cancer Society guidelines of at  least 150 minutes of moderate-intensity activity or 75 minutes of vigorous activity each week. (Or a combination of both.) Exercise should be spread  out over the week. Regular recreational exercise has been shown to reduce the risk of cancer by 30%. She is very active, running and working out several days per week.  3. Maintain a healthy weight with a Body Mass Index between 19-24.9. A postmenopausal BMI of 30.7 has been shown to have increased the risk for breast cancer by 37% in some studies. Her BMI is 23.2, fully clothed.  4. Do not use tobacco products and limit exposure to passive smoke. She does not smoke.   5. Breastfeed if possible. Research shows that 16+ months of breastfeeding, over a lifetime, can reduce a woman's risk of breast cancer by up to 27%. She  about 4 months total.    INDIVIDUALIZED CANCER RISK MANAGEMENT PLAN:  Individualized Surveillance Plan for women  With 20% or greater lifetime risk of breast cancer   Per NCCN Breast Cancer Screening and Diagnosis Guidelines Version 2.2018    Recommended screening Test or procedure Last done Next Scheduled    Clinical encounter Ongoing risk assessment, risk reduction counseling and clinical breast exam, every 6-12 months. Refer to genetic counseling if not already done.   4/11/2019 October 2019   However, some family histories with breast cancers at a very young age, may warrant screening starting earlier.    *May begin at age 40 if breast cancers in the family occur at later ages.    Annual mammogram beginning 10 years younger than the earliest breast cancer in the family but not prior to age 30.    Recommend annual breast MRI to begin 10 years younger than the earliest breast cancer in the family but not prior to age 25.    Breast MRIs are preferably done on day 7-15 of the menstrual cycle in premenopausal women. 6/6/2018 -- Digital mammogram diagnostic unilateral tomosynthesis and Ultrasound of the right medial breast:  An oval hypoechoic  solid-appearing mass is present at the 12 o'clock position 4 cm from the nipple measuring 0.6 cm.  A round hypoechoic mass with through transmission is present at the 1230 position, 2 cm from the nipple measuring 0.6 cm which may represent a complicated cyst.  Scattered tiny benign cysts are noted in the medial right breast. BI-RADS category: 4 suspicious -ultrasound-guided biopsy recommended.    No history of breast MRI Breast MRI ordered to be done soon.    Next exam: October followed by screening tomosynthesis mammogram   Breast screening for patients at high risk due to thoracic radiation before 30 years old    Begin 10 years post radiation treatment or age 25.   Annual mammogram beginning 10 years after radiation but not prior to age 30    Annual breast MRI, preferably on day 7-15 of menstrual cycle for premenopausal women.       NA     NA   Women who have a lifetime risk of >20% based on history of LCIS or ADH/ALH Annual screening mammogram beginning at age of LCIS or ADH/ALH but not prior to age 30.    Consider annual MRI to begin at age of diagnosis of LCIS or ADH/ALH but not prior to age 25.    Consider risk reducing strategies.     NA     NA    Recommend risk reducing strategies for women with 1.7% 5 year risk of breast cancer. 4.2% 5 year risk by JACOB model      I will be happy to work with her to manage her cancer risk, will follow up on her screenings and see her in the Cancer Risk Management clinic in six months.    I spent 35 minutes with the patient with greater that 50% of it in counseling and coordinating care as documented above.    Imani Gallagher, KUSH-CNS, OCN, AGN-BC  Clinical Nurse Specialist  Cancer Risk Management Program  [address]  [place] [address]    phone:  [phone]  Pager: [phone]  fax: [phone]    CC: MD Naomi Lockett MD                            Oncology Rooming Note    April  "11, 2019 11:07 AM   Brittney Morris is a 43 year old female who presents for:    Chief Complaint   Patient presents with     Oncology Clinic Visit     Initial Vitals: There were no vitals taken for this visit. Estimated body mass index is 23.15 kg/m  as calculated from the following:    Height as of 8/22/18: 1.6 m (5' 3\").    Weight as of 8/22/18: 59.3 kg (130 lb 11.2 oz). There is no height or weight on file to calculate BSA.  Data Unavailable Comment: Data Unavailable   No LMP recorded.  Allergies reviewed: Yes  Medications reviewed: Yes    Medications: Medication refills not needed today.  Pharmacy [facility] entered into [employer]: [facility] [id number] [place], [place] - [address] AT  [address] [facility]    Clinical concerns: no           Hannah Pozo MA                                    "

## 2019-04-11 NOTE — PROGRESS NOTES
"Oncology Rooming Note    April 11, 2019 11:07 AM   Brittney Morris is a 43 year old female who presents for:    Chief Complaint   Patient presents with     Oncology Clinic Visit     Initial Vitals: There were no vitals taken for this visit. Estimated body mass index is 23.15 kg/m  as calculated from the following:    Height as of 8/22/18: 1.6 m (5' 3\").    Weight as of 8/22/18: 59.3 kg (130 lb 11.2 oz). There is no height or weight on file to calculate BSA.  Data Unavailable Comment: Data Unavailable   No LMP recorded.  Allergies reviewed: Yes  Medications reviewed: Yes    Medications: Medication refills not needed today.  Pharmacy name entered into [employer]: [facility] [address] [place] [address] [id number] AT  [address] [facility]    Clinical concerns: no           Hannah Pozo MA              "

## 2019-04-11 NOTE — PATIENT INSTRUCTIONS
Individualized Surveillance Plan for women  With 20% or greater lifetime risk of breast cancer   Per NCCN Breast Cancer Screening and Diagnosis Guidelines Version 2.2018    Recommended screening Test or procedure Last done Next Scheduled    Clinical encounter Ongoing risk assessment, risk reduction counseling and clinical breast exam, every 6-12 months. Refer to genetic counseling if not already done.   4/11/2019 October 2019   However, some family histories with breast cancers at a very young age, may warrant screening starting earlier.    *May begin at age 40 if breast cancers in the family occur at later ages.    Annual mammogram beginning 10 years younger than the earliest breast cancer in the family but not prior to age 30.    Recommend annual breast MRI to begin 10 years younger than the earliest breast cancer in the family but not prior to age 25.    Breast MRIs are preferably done on day 7-15 of the menstrual cycle in premenopausal women. 6/6/2018 -- Digital mammogram diagnostic unilateral tomosynthesis and Ultrasound of the right medial breast:  An oval hypoechoic solid-appearing mass is present at the 12 o'clock position 4 cm from the nipple measuring 0.6 cm.  A round hypoechoic mass with through transmission is present at the 1230 position, 2 cm from the nipple measuring 0.6 cm which may represent a complicated cyst.  Scattered tiny benign cysts are noted in the medial right breast. BI-RADS category: 4 suspicious -ultrasound-guided biopsy recommended.    No history of breast MRI Breast MRI ordered to be done soon.    Next exam: October followed by screening tomosynthesis mammogram   Breast screening for patients at high risk due to thoracic radiation before 30 years old    Begin 10 years post radiation treatment or age 25.   Annual mammogram beginning 10 years after radiation but not prior to age 30    Annual breast MRI, preferably on day 7-15 of menstrual cycle for premenopausal women.       NA     NA    Women who have a lifetime risk of >20% based on history of LCIS or ADH/ALH Annual screening mammogram beginning at age of LCIS or ADH/ALH but not prior to age 30.    Consider annual MRI to begin at age of diagnosis of LCIS or ADH/ALH but not prior to age 25.    Consider risk reducing strategies.     NA     NA    Recommend risk reducing strategies for women with 1.7% 5 year risk of breast cancer. 4.2% 5 year risk by JACOB model      Medicines to Reduce Breast Cancer Risk      Tamoxifen and raloxifene  Tamoxifen and raloxifene are the only drugs that are approved in the [place] to help lower the risk of breast cancer. Both of these drugs are classified as selective estrogen receptor modulators (or SERMs).     This means that they act against (or block) estrogen (a female hormone) in some tissues of the body, but act like estrogen in others. Estrogen can fuel the growth of breast cancer cells.     Both of these drugs block estrogen in breast cells, which is why they can be useful in lowering the risk of breast cancer.    These drugs are more often used for other things.   1. The main use of tamoxifen is to treat hormone receptor-positive breast cancer (breast cancer with cells that have estrogen and/or progesterone receptors on them).   2. The main use of raloxifene is to prevent and treat osteoporosis (very weak bones) in post-menopausal women.    Tamoxifen can be taken whether or not you have gone through menopause, but raloxifene is only approved for post-menopausal women.    LOWER DOSE TAMOXIFEN for Prevention of breast cancer in women with DCIS, LCIS or atypia on biopsy:    Treatment with a lower dose of tamoxifen (5 mg per day) has been shown to reduce the risk for disease recurrence and new disease for women who had been treated with lumpectomy  for ductal carcinoma in situ (DCIS), lobular carcinoma in situ( LCIS) and atypical ductal hyperplasia (ADH) in a randomized, phase III clinical trial (SANDY-01). The trial  involved 500 women over a period of 5 years. The data was shown to reduce the risk for recurrence or new disease by 52%.   This data was presented at the 2018 [facility] [facility] help December 4-8, 2018.    Notably, in this Phase III clinical trial, the data showed that the lower dose of tamoxifen did not cause significant adverse events or increase menopausal symptoms.       How much do these drugs lower the risk  of breast cancer?  The effect of full dose tamoxifen and raloxifene on breast cancer risk has varied in different studies. When the results of all the studies are taken together, the overall reduction in risk for these drugs is about 40% (more than a third).These drugs lower the risk of both invasive breast cancer and ductal carcinoma in situ (DCIS).    What are the main risks and side effects?  Although menstrual cycles can start again after the drug is stopped, they don t always, and some women go into menopause. This is more likely in women who were close to menopause when they started taking the drug.  Other more serious side effects are rare. These include serious blood clots and cancer of the uterus.  Blood clots  Both full dose tamoxifen and raloxifene increase your risk of developing blood clots in a vein in your leg (deep venous thrombosis) or in your lungs (pulmonary embolism). These clots can sometimes cause serious problems, and even death. In the major studies looking at these drugs for breast cancer prevention, the overall risk of these blood clots over 5 years of treatment was less than 1%. This risk could be higher if you had a serious blood clot in the past, so these drugs are generally not recommended to lower breast cancer risk for anyone with a history of blood clots.  Because these drugs increase your risk of developing serious blood clots, there is also concern that they might also increase your risk of heart attack or stroke, although this is not clear. This is  something you might want to discuss with your doctor, especially if you have a history of a heart attack or stroke, or if you are at increased risk for them     Cancer of the uterus  Because tamoxifen acts like estrogen in the uterus, it can increase your risk of endometrial cancer  and uterine sarcoma (cancers of the uterus). It also is linked to a higher risk of endometrial pre-cancers. Raloxifene does not act like estrogen in the uterus and is not linked to an increased risk of uterine cancer.    Although tamoxifen does increase the risk of uterine cancer, the overall increase in risk is low (less than 1%). The risk of uterine cancer goes back to normal within a few years of stopping the drug. The increased risk seems to affect women over 50, but not younger women.    If you have been diagnosed with uterine cancer or pre-cancer you should not take tamoxifen.  If you have had a hysterectomy (surgery to remove the uterus), you are not at risk for endometrial cancer or uterine sarcoma and do not have to worry about these cancers.    If you are taking tamoxifen, tell your doctor if you have any abnormal vaginal bleeding or spotting, especially after  menopause, as these are possible symptoms of uterine cancer.    Written by The American [employer] Society medical and editorial content team  (/[email or web address]-medical-content-and-news-[email or web address])  Last Medical Review: September 6, 2017           Patient Education     Magnetic Resonance Imaging (MRI) of the Breast     During a breast MRI, you lie face down on a platform that slides into a tubelike machine called a scanner.     Magnetic resonance imaging (MRI) of the breast is an imaging test that uses strong magnets and radio waves to form pictures of the inside of the breast. It also creates images of the tissues that surround the breast. Breast MRI is used to check for problems, such as a leaking breast implant or a suspicious lump or mass. It can also be used to help determine  if breast cancer is present and aid in diagnosis and management. Most MRI tests take 30 to 60 minutes. Depending on the type of MRI you are having, the test may take longer. Give yourself extra time to check in.   Before your test    Follow any directions you are given for taking medicines and for not eating or drinking before the test.    Follow your normal daily routine unless your provider tells you otherwise.    You ll be asked to remove your watch, hearing aids, credit cards, pens, eyeglasses, and other metal objects.    You may be asked to remove your makeup. Makeup may contain some metal.  MRI uses strong magnets. Metal is affected by magnets and can distort the image. The magnet used in MRI can cause metal objects in your body to move. If you have a metal implant, you may not be able to have an MRI unless the implant is certified as MRI safe. People with these implants should not have an MRI:    Ear (cochlear) implants    Certain clips used for brain aneurysms    Certain metal coils put in blood vessels    Most defibrillators    Most pacemakers  The radiologist or technologist may ask you if you:    Have had stereotactic breast biopsy    Have a pacemaker or implanted cardiac defibrillator    Have an artificial body part (prosthesis)    Have metal rods, screws, plates, or splinters in your body    Wear a medicated adhesive patch    Have tattoos or body piercings. Some tattoo inks contain metal.    Have implanted nerve stimulators or drug-infusion ports    Work with metal    Have braces    Have a bullet or other metal in your body  Tell the radiologist or technologist if you:    Are allergic to any medicines    Are pregnant or think you may be pregnant    Are afraid of small, enclosed spaces (claustrophobic)    Have any serious health problems (If you have kidney disease or a liver transplant  you may not be able to have the contrast material used for MRI)    Have had previous surgeries    Are breastfeeding    During your test    You may be asked to wear a hospital gown.    You may be given earplugs to wear if you desire.    You may be injected with contrast through an intravenous (IV) line in your hand or arm. This is a special dye that makes the MRI image sharp that may be needed depending on the reason for your exam.    You ll lie on a platform that slides into a tube-like machine called a scanner. You ll be on your stomach with your breasts placed through openings in the platform.    Remain as still as you can while the camera takes the pictures. This will ensure the best images.  After your test    You can get back to normal activities right away.    If you were given contrast, it will pass naturally through your body within a day.    Drink lots of water so that the dye passes quickly out of your body.  Getting your results  Your healthcare provider will discuss the test results with you during a follow-up appointment or over the phone.  Date Last Reviewed: 2/1/2017 2000-2018 [employer]. [address] [place] [address], [place] [address]. All rights reserved. This information is not intended as a substitute for professional medical care. Always follow your healthcare professional's instructions.           Patient Education   Understanding Breast Density  What is breast density?  Breasts are made of connective tissue, glandular tissue and fatty tissue. Dense breasts have a lot of the first two types of tissue, but not much fat.  Why is it important?  Having dense breasts means you have a slightly higher risk of getting breast cancer. It also means your mammograms will be harder to read. This is because both dense tissue and lumps look white on a mammogram. Fatty tissue looks black. The pictures below show the 4 levels of breast density:     How do I know if I have dense breasts?  Only a mammogram can tell you if you have dense breasts. The person who reviews your mammogram (radiologist) will give your  breasts a density score based on the levels shown above.  What should I do?  Talk to your doctor about your options. But know that mammograms are proven to reduce cancer deaths. Plus, a yearly mammogram is even more important for women who have a higher risk of breast cancer. Your doctor may order other tests as well.  You should also know how your breasts look and feel. Any time you feel or see something that isn't normal, tell your doctor.  For informational purposes only. Not to replace the advice of your health care provider. Images courtesy American College of Radiology (ACR)   and Society of Breast Imaging (SBI). Used with permission. Text copyright   2014 [employer] ([employer]). All rights reserved. [employer] [id number] - 08/14.

## 2019-04-19 ENCOUNTER — HEALTH MAINTENANCE LETTER (OUTPATIENT)
Age: 44
End: 2019-04-19

## 2019-05-06 ENCOUNTER — HOSPITAL ENCOUNTER (OUTPATIENT)
Dept: MRI IMAGING | Facility: CLINIC | Age: 44
Discharge: HOME | End: 2019-05-06
Attending: CLINICAL NURSE SPECIALIST | Admitting: CLINICAL NURSE SPECIALIST
Payer: COMMERCIAL

## 2019-05-06 ENCOUNTER — TELEPHONE (OUTPATIENT)
Dept: MAMMOGRAPHY | Facility: CLINIC | Age: 44
End: 2019-05-06

## 2019-05-06 DIAGNOSIS — Z12.39 BREAST CANCER SCREENING, HIGH RISK PATIENT: ICD-10-CM

## 2019-05-06 PROCEDURE — 25500064 ZZH RX 255 OP 636: Performed by: CLINICAL NURSE SPECIALIST

## 2019-05-06 PROCEDURE — 25800025 ZZH RX 258: Performed by: CLINICAL NURSE SPECIALIST

## 2019-05-06 PROCEDURE — 77049 MRI BREAST C-+ W/CAD BI: CPT

## 2019-05-06 PROCEDURE — A9585 GADOBUTROL INJECTION: HCPCS | Performed by: CLINICAL NURSE SPECIALIST

## 2019-05-06 RX ORDER — ACYCLOVIR 200 MG/1
30 CAPSULE ORAL ONCE
Status: COMPLETED | OUTPATIENT
Start: 2019-05-06 | End: 2019-05-06

## 2019-05-06 RX ORDER — GADOBUTROL 604.72 MG/ML
6 INJECTION INTRAVENOUS ONCE
Status: COMPLETED | OUTPATIENT
Start: 2019-05-06 | End: 2019-05-06

## 2019-05-06 RX ADMIN — SODIUM CHLORIDE 30 ML: 9 INJECTION, SOLUTION INTRAMUSCULAR; INTRAVENOUS; SUBCUTANEOUS at 08:12

## 2019-05-06 RX ADMIN — GADOBUTROL 6 ML: 604.72 INJECTION INTRAVENOUS at 08:11

## 2019-05-06 NOTE — TELEPHONE ENCOUNTER
Patient notified of within normal limits breast MRI.  Instructed patient to continue with high risk screening with annual screening mammogram & annual breast MRI.  She will be due after 12/5/2019.  Informed her to speak with her PCP regarding future breast MRIs.  I also informed patient to notify her physician if any new breast concerns or symptoms develop.  Patient verbalized understanding.  Mere Green RN, BSN            Study Result     MRI BREASTS, BILATERAL, WITHOUT AND WITH CONTRAST  5/6/2019 9:14 AM     HISTORY: High-risk screening. Family history of breast cancer  including her sister, diagnosed at age 42, and her aunt, diagnosed in  her 50s. No current clinical symptoms are reported. She has a history  of a benign excisional biopsy in August 2018. She has dense tissue on  mammography.     COMPARISON: No previous breast MRIs. Correlation is made with the most  recent screening mammogram 12/5/2018.       FINDINGS: There is moderate stippled background parenchymal  enhancement bilaterally. No focal abnormal contrast enhancement or  other suspicious finding in either breast. Artifact from a biopsy  marker in the right breast. Mild architectural distortion in the upper  right breast at excisional biopsy site. There is no axillary  lymphadenopathy.                                                                       IMPRESSION: BI-RADS CATEGORY: 2 - Benign Finding(s).     RECOMMENDED FOLLOW-UP: Continued high risk screening with annual  mammography and annual breast MRI.     ELENA PRADHAN MD

## 2019-05-20 ENCOUNTER — TELEPHONE (OUTPATIENT)
Dept: ONCOLOGY | Facility: CLINIC | Age: 44
End: 2019-05-20

## 2019-05-20 NOTE — TELEPHONE ENCOUNTER
Per Imani Staff message please reschedule visit with Imani Gallagher for December 2019 with a mammogram to follow on same day.

## 2019-08-22 ENCOUNTER — TELEPHONE (OUTPATIENT)
Dept: ONCOLOGY | Facility: CLINIC | Age: 44
End: 2019-08-22

## 2019-08-22 DIAGNOSIS — Z91.89 AT HIGH RISK FOR BREAST CANCER: ICD-10-CM

## 2019-08-22 DIAGNOSIS — Z12.39 BREAST CANCER SCREENING, HIGH RISK PATIENT: ICD-10-CM

## 2019-08-22 RX ORDER — TAMOXIFEN CITRATE 10 MG/1
5 TABLET ORAL DAILY
Qty: 90 TABLET | Refills: 0 | Status: SHIPPED | OUTPATIENT
Start: 2019-08-22 | End: 2020-01-02 | Stop reason: SINTOL

## 2019-12-12 ENCOUNTER — HOSPITAL ENCOUNTER (OUTPATIENT)
Dept: MAMMOGRAPHY | Facility: CLINIC | Age: 44
Discharge: HOME | End: 2019-12-12
Attending: CLINICAL NURSE SPECIALIST | Admitting: CLINICAL NURSE SPECIALIST
Payer: COMMERCIAL

## 2019-12-12 DIAGNOSIS — Z12.39 BREAST CANCER SCREENING, HIGH RISK PATIENT: ICD-10-CM

## 2019-12-12 PROCEDURE — 77063 BREAST TOMOSYNTHESIS BI: CPT

## 2020-01-02 ENCOUNTER — TELEPHONE (OUTPATIENT)
Dept: ONCOLOGY | Facility: CLINIC | Age: 45
End: 2020-01-02

## 2020-01-02 DIAGNOSIS — Z12.39 BREAST CANCER SCREENING, HIGH RISK PATIENT: Primary | ICD-10-CM

## 2020-01-02 DIAGNOSIS — Z91.89 AT HIGH RISK FOR BREAST CANCER: ICD-10-CM

## 2020-01-02 NOTE — PROGRESS NOTES
Called [facility] (Dr. Walton) and spoke with Myrtle Dangelo, RN triage. Told her about Brittney's menstrual cycles stopping x3-4 months post low dose tamoxifen and now currently nonstopy menstruation x2 weeks.     Myrtle will get her in for an exam w/Dr. Walton, pelvic US and possible endometrial biopsy.

## 2020-01-16 ENCOUNTER — HOSPITAL PATHOLOGY (OUTPATIENT)
Dept: OTHER | Facility: CLINIC | Age: 45
End: 2020-01-16

## 2020-01-17 LAB — COPATH REPORT: NORMAL

## 2020-03-02 ENCOUNTER — HEALTH MAINTENANCE LETTER (OUTPATIENT)
Age: 45
End: 2020-03-02

## 2020-09-11 ENCOUNTER — HOSPITAL ENCOUNTER (EMERGENCY)
Facility: CLINIC | Age: 45
Discharge: HOME | End: 2020-09-11
Attending: EMERGENCY MEDICINE | Admitting: EMERGENCY MEDICINE
Payer: COMMERCIAL

## 2020-09-11 VITALS
OXYGEN SATURATION: 97 % | HEART RATE: 66 BPM | DIASTOLIC BLOOD PRESSURE: 92 MMHG | TEMPERATURE: 98 F | SYSTOLIC BLOOD PRESSURE: 130 MMHG | RESPIRATION RATE: 21 BRPM

## 2020-09-11 DIAGNOSIS — R55 SYNCOPE, UNSPECIFIED SYNCOPE TYPE: ICD-10-CM

## 2020-09-11 DIAGNOSIS — R42 DIZZINESS: ICD-10-CM

## 2020-09-11 LAB
ALBUMIN SERPL-MCNC: 3.9 G/DL (ref 3.4–5)
ALP SERPL-CCNC: 56 U/L (ref 40–150)
ALT SERPL W P-5'-P-CCNC: 28 U/L (ref 0–50)
ANION GAP SERPL CALCULATED.3IONS-SCNC: 3 MMOL/L (ref 3–14)
AST SERPL W P-5'-P-CCNC: 39 U/L (ref 0–45)
BASOPHILS # BLD AUTO: 0 10E9/L (ref 0–0.2)
BASOPHILS NFR BLD AUTO: 0.2 %
BILIRUB SERPL-MCNC: 0.5 MG/DL (ref 0.2–1.3)
BUN SERPL-MCNC: 11 MG/DL (ref 7–30)
CALCIUM SERPL-MCNC: 8.3 MG/DL (ref 8.5–10.1)
CHLORIDE SERPL-SCNC: 105 MMOL/L (ref 94–109)
CO2 SERPL-SCNC: 28 MMOL/L (ref 20–32)
CREAT SERPL-MCNC: 0.63 MG/DL (ref 0.52–1.04)
DIFFERENTIAL METHOD BLD: NORMAL
EOSINOPHIL # BLD AUTO: 0.1 10E9/L (ref 0–0.7)
EOSINOPHIL NFR BLD AUTO: 0.8 %
ERYTHROCYTE [DISTWIDTH] IN BLOOD BY AUTOMATED COUNT: 12.9 % (ref 10–15)
GFR SERPL CREATININE-BSD FRML MDRD: >90 ML/MIN/{1.73_M2}
GLUCOSE SERPL-MCNC: 87 MG/DL (ref 70–99)
HCG SERPL QL: NEGATIVE
HCT VFR BLD AUTO: 38.6 % (ref 35–47)
HGB BLD-MCNC: 12.9 G/DL (ref 11.7–15.7)
IMM GRANULOCYTES # BLD: 0 10E9/L (ref 0–0.4)
IMM GRANULOCYTES NFR BLD: 0.2 %
INTERPRETATION ECG - MUSE: NORMAL
LYMPHOCYTES # BLD AUTO: 1.2 10E9/L (ref 0.8–5.3)
LYMPHOCYTES NFR BLD AUTO: 19.9 %
MCH RBC QN AUTO: 30.9 PG (ref 26.5–33)
MCHC RBC AUTO-ENTMCNC: 33.4 G/DL (ref 31.5–36.5)
MCV RBC AUTO: 92 FL (ref 78–100)
MONOCYTES # BLD AUTO: 0.7 10E9/L (ref 0–1.3)
MONOCYTES NFR BLD AUTO: 11 %
NEUTROPHILS # BLD AUTO: 4.1 10E9/L (ref 1.6–8.3)
NEUTROPHILS NFR BLD AUTO: 67.9 %
NRBC # BLD AUTO: 0 10*3/UL
NRBC BLD AUTO-RTO: 0 /100
PLATELET # BLD AUTO: 207 10E9/L (ref 150–450)
POTASSIUM SERPL-SCNC: 3.6 MMOL/L (ref 3.4–5.3)
PROT SERPL-MCNC: 7.2 G/DL (ref 6.8–8.8)
RBC # BLD AUTO: 4.18 10E12/L (ref 3.8–5.2)
SODIUM SERPL-SCNC: 136 MMOL/L (ref 133–144)
TROPONIN I SERPL-MCNC: <0.015 UG/L (ref 0–0.04)
WBC # BLD AUTO: 6 10E9/L (ref 4–11)

## 2020-09-11 PROCEDURE — 84703 CHORIONIC GONADOTROPIN ASSAY: CPT | Performed by: EMERGENCY MEDICINE

## 2020-09-11 PROCEDURE — 80053 COMPREHEN METABOLIC PANEL: CPT | Performed by: EMERGENCY MEDICINE

## 2020-09-11 PROCEDURE — 25000132 ZZH RX MED GY IP 250 OP 250 PS 637: Performed by: EMERGENCY MEDICINE

## 2020-09-11 PROCEDURE — 85025 COMPLETE CBC W/AUTO DIFF WBC: CPT | Performed by: EMERGENCY MEDICINE

## 2020-09-11 PROCEDURE — 96360 HYDRATION IV INFUSION INIT: CPT

## 2020-09-11 PROCEDURE — 25800030 ZZH RX IP 258 OP 636: Performed by: EMERGENCY MEDICINE

## 2020-09-11 PROCEDURE — 99284 EMERGENCY DEPT VISIT MOD MDM: CPT | Mod: 25

## 2020-09-11 PROCEDURE — 93005 ELECTROCARDIOGRAM TRACING: CPT

## 2020-09-11 PROCEDURE — 84484 ASSAY OF TROPONIN QUANT: CPT | Performed by: EMERGENCY MEDICINE

## 2020-09-11 RX ORDER — MECLIZINE HYDROCHLORIDE 25 MG/1
25 TABLET ORAL 3 TIMES DAILY PRN
Qty: 20 TABLET | Refills: 0 | Status: SHIPPED | OUTPATIENT
Start: 2020-09-11 | End: 2023-01-24

## 2020-09-11 RX ORDER — MECLIZINE HYDROCHLORIDE 25 MG/1
25 TABLET ORAL ONCE
Status: COMPLETED | OUTPATIENT
Start: 2020-09-11 | End: 2020-09-11

## 2020-09-11 RX ADMIN — SODIUM CHLORIDE 1000 ML: 9 INJECTION, SOLUTION INTRAVENOUS at 11:28

## 2020-09-11 RX ADMIN — MECLIZINE HYDROCHLORIDE 25 MG: 25 TABLET ORAL at 11:28

## 2020-09-11 NOTE — ED AVS SNAPSHOT
Emergency Department  [address] [facility] [address]  Phone:  [phone]  Fax:  [phone]                                    Brittney Morris   MRN: [id number]    Department:   Emergency Department   Date of Visit:  9/11/2020           After Visit Summary Signature Page    I have received my discharge instructions, and my questions have been answered. I have discussed any challenges I see with this plan with the nurse or doctor.    ..........................................................................................................................................  Patient/Patient Representative Signature      ..........................................................................................................................................  Patient Representative Print Name and Relationship to Patient    ..................................................               ................................................  Date                                   Time    ..........................................................................................................................................  Reviewed by Signature/Title    ...................................................              ..............................................  Date                                               Time          22EPIC Rev 08/18

## 2020-09-11 NOTE — ED TRIAGE NOTES
Patient presents to the ED complaining of dizziness.  States she had a syncopal episode yesterday.

## 2020-09-11 NOTE — ED PROVIDER NOTES
"  History     Chief Complaint:  Dizziness    HPI   Brittney Morris is a 44 year old female with a history of anxiety, seizures, and syncope who presents to the emergency department for evaluation of dizziness. The patient reports that yesterday she was working from home when she began feeling she was in a \"dream-like state.\" She tried to move to lay down and later woke up on the floor. She rested a while and thought she was doing better, but today she has been feeling very dizzy. She states that when sitting up she feels that the room is spinning. Additionally, she mentions that 2 days ago and yesterday she had some palpitations. She also mentions that she currently has a mild frontal headache and is concerned that she is dehydrated. The patient has had episodes of syncope before has been evaluated for this in the past. She was previously hospitalized for a day in 2017 where she underwent a cardiac workup that did not reveal a cause for her syncope. She was most recently seen at [facility] where it was discovered that she had a vitamin deficiency and she has been taking a supplement since.  She reports previously having brain MRI and EEG under similar circumstances with negative results.  The patient otherwise denies any ear pain, fevers, cough, appetite changes, vomiting or diarrhea. She states that she has a family history of seizures and fainting. She also notes that she has been drinking more wine than normal recently and has been having a few glasses per night. The patient states that she has no concern for pregnancy and her menstrual cycle is irregular.     Allergies:  No Known Drug Allergies    Medications:    Norco  Senokot  Sertraline    Past Medical History:    Anxiety  Seizures  UTI  Syncope    Past Surgical History:    Breast biopsy  D & C  [employer] teeth extraction    Family History:    Mother: hypertension  Sister: breast cancer    Social History:  The patient was accompanied to " the ED by her .  Smoking Status: Never  Smokeless Tobacco: Never   Alcohol Use: Yes  Drug Use: No  Marital Status:        Review of Systems   All other systems reviewed and are negative.    Physical Exam   Vitals:  Patient Vitals for the past 24 hrs:   BP Temp Temp src Pulse Resp SpO2   09/11/20 1130 (!) 130/92 -- -- 66 21 97 %   09/11/20 1047 (!) 143/60 98  F (36.7  C) Temporal 77 18 98 %     Orthostatics:   Lying Blood Pressure: 125/75 Lying Heart Rate 65  Standing Blood Pressure: 132/88 Standing Heart Rate 65    Physical Exam  General: Nontoxic-appearing woman sitting upright in room 15,  at bedside  HENT: mucous membranes moist, TMs clear  Eyes: PERRL, EOMI, no nystagmus  CV: rate as above, regular rhythm  Resp: normal effort, speaks in full phrases, no cough observed  GI: abdomen soft and nontender, no guarding  MSK: no bony tenderness, mastoids nontender  Skin: appropriately warm and dry, no facial rash  Neuro: awake, alert, clear speech, fully oriented, face symmetric,  normal, finger-nose normal bilaterally, strength and sensation intact in all extr, no nuchal rigidity, ambulation not initially tested   Psych: cooperative, pleasant    Emergency Department Course   ECG:  Indication: Dizziness  Completed at 1112.  Read at 1116.   Rate 65 bpm. CO interval 128. QRS duration 96. QT/QTc 416/432. P-R-T axes 54 55 5.  Normal sinus rhythm  Normal ECG    Laboratory:  CBC: WNL. (WBC 6.0, HGB 12.9, )   CMP: Calcium 8.3 (L) o/w AWNL (Creatinine 0.63)    Troponin (Collected 1134): <0.015  HCG Qualitative Blood: Negative     Interventions:  1128 NS, 1 L, IV bolus  1128 Antivert 25 mg PO    Emergency Department Course:  Past medical records, nursing notes, and vitals reviewed.    1110 I performed an exam of the patient as documented above.     EKG obtained in the ED, see results above.     IV was inserted and blood was drawn for laboratory testing, results above.    1235 I rechecked the  "patient and discussed the results of her workup thus far.     Findings and plan explained to the Patient. Patient discharged home with instructions regarding supportive care, medications, and reasons to return. The importance of close follow-up was reviewed. The patient was prescribed Antivert.    I personally reviewed the laboratory results with the Patient and answered all related questions prior to discharge.      Impression & Plan      Medical Decision Making:  The etiology of her presenting symptoms is unconfirmed despite testing as above.  Regarding her syncope, I considered arrhythmia, orthostasis, pulmonary embolism, and a variety of others.  However, I note that she has had syncope on a number of prior occasions, and also had a reassuring echocardiogram in 2017.  She did not have preceding chest pain or other features to raise higher concern for serious cardiopulmonary or neurologic process.  Regarding her dizziness, she describes some lightheadedness as well as some room spinning.  She has had this in the past and has had prior extensive work-up including MRI and EEG.  I felt that a trial of meclizine was reasonable, acknowledging that her symptoms are not classic for straightforward vertigo.  However, given the intermittent duration of her symptoms over a long time with prior work-up, I do not think she requires emergent neuroimaging.  She has no objective neurologic deficits on exam.  She is ambulatory and afebrile.  Low suspicion for infection.  Diagnostic uncertainty as well as reassurances of today's evaluation were reviewed with the patient and her  who are both comfortable to plan for discharge home and outpatient follow-up.  Patient states that she expected to have a negative work-up here, stating that this is \"always what happens\" when she seeks evaluation for the symptoms.      Diagnosis:    ICD-10-CM    1. Dizziness  R42    2. Syncope, unspecified syncope type  R55  "       Disposition:  discharged to home    Discharge Medications:  New Prescriptions    MECLIZINE (ANTIVERT) 25 MG TABLET    Take 1 tablet (25 mg) by mouth 3 times daily as needed for dizziness     I, Jaylen Mireles, am serving as a scribe on 9/11/2020 at 10:54 AM to personally document services performed by Maximilian Bianchi MD based on my observations and the provider's statements to me.    Jaylen Mireles  9/11/2020    EMERGENCY DEPARTMENT    This note was completed in part using Dragon voice recognition software. Although reviewed after completion, some word and grammatical errors may occur.        Maximilian Bianchi MD  09/11/20 2814

## 2020-12-14 ENCOUNTER — HEALTH MAINTENANCE LETTER (OUTPATIENT)
Age: 45
End: 2020-12-14

## 2020-12-17 ENCOUNTER — TELEPHONE (OUTPATIENT)
Dept: ONCOLOGY | Facility: CLINIC | Age: 45
End: 2020-12-17

## 2020-12-17 NOTE — PROGRESS NOTES
Oncology Risk Management Consultation:  Date on this visit: 12/18/2020    Brittney Morris is participating in a billable video visit today due to the CoVid19 pandemic precautions. It was completed via Alai as a telephone visit, due to the patient's technical difficulties.    She requires high risk screening and surveillance to reduce  her risk of cancer secondary to a family history of breast cancer in her sister at age 42, and a maternal aunt in her 50s,      She has a 28.9%  lifetime risk for breast cancer by the Bulmaro model and a 42.9% lifetime risk by the JACOB model. Her 5 year risk is 4.2% by the JACOB model.     Primary Physician:  Johana Agrawal MD    History Of Present Illness:  Ms. Morris is a very pleasant , healthy 45 year old female who presents with a family history of breast cancer.      Pertinent history:  Menarche at 12.  First child at 38.  12/3/2012 - Partial molar pregnancy.  Premenopausal.  Ovaries, fallopian tubes and uterus intact.  LMP:  unknown; issues with chronic spotting, currently using IUD to control bleeding  Breast Density: heterogeneously dense.  Hx of OCP use x3-4 years.  Vasectomy for birth control.  No hx of HRT.  6/7/2018: Right breast core biopsies x2:                 Site A: 1230 position, 2 cm from nipple measuring 0.6 cm biopsied.  An X shaped clip was placed                Site B; 0.6 cm ill-defined hypoechoic lesion at 12:00, 4 cm from the nipple was biopsied -Q shaped clip was placed   Percutaneous core needle biopsy, right:    A.  Benign intraductal papilloma  B: Fragments of a radial scar (complex sclerosing lesion (with associated calcifications.  There is no evidence of invasive malignancy on this biopsy specimen.  Imaging reviewed with Dr. Santiago and Dr. Parisi who agreed the pathologic findings are concordant with radiologic findings.     8/22/2018 - Excisional biopsy, right breast: no evidence of residual radial scar, fibrocystic changes with usual  ductal hyperplasia and microcalcifications. Negative for in situ or invasive carcinoma.  No hx of atypia or malignancy.   History of low dose tamoxifen from April-2019; stopped due to dizziness, side effects.     Genetic testin2019 NEGATIVE for mutations in the MARIIA, BRCA1, BRCA2, BRIP1, CDH1, CHEK2, EPCAM, MLH1, MSH2, MSH6, NBN, NF1, PALB2, PMS2, PTEN, RAD51C, RAD51D, STK11, and TP53 genes using a Custom Next-Cancer (19 genes, a combination of Gyn Plus and BRCA plus + NBN, STK11, and NF1) panel  from [employer].  No mutations were found in any of the 19 genes analyzed. This test involved sequencing and deletion/duplication analysis of all genes with the exception of EPCAM (deletions/duplications only). This testing was done because of her family history of her sister's breast cancer diagnosed at age 42, and her maternal aunt's breast cancer diagnosed in her 50's.     Pertinent screening history:  2014 right breast ultrasound for palpable right axillary lump. Patient is currently breast-feeding.  BI-RADS Category 1.       2015: Baseline screening mammogram, BI-RADS 1.     10/11/2016: Screening Tomosynthesis mammogram, BI-RADS 1  .  10/12/2017: Screening Tomosynthesis mammogram, BI-RADS 1.    2018 -- Digital mammogram diagnostic unilateral tomosynthesis and Ultrasound of the right medial breast:  An oval hypoechoic solid-appearing mass is present at the 12 o'clock position 4 cm from the nipple measuring 0.6 cm.  A round hypoechoic mass with through transmission is present at the 1230 position, 2 cm from the nipple measuring 0.6 cm which may represent a complicated cyst.  Scattered tiny benign cysts are noted in the medial right breast. BI-RADS category: 4 suspicious -ultrasound-guided biopsy recommended.  2018- Screening tomosynthesis mammogram, BiRads2  2019- Breast MRI, BiRads2  2019- Screening tomosynthesis mammogram, BiRads1    At this visit, she denies new  fatigue, breast asymmetry, lumps, masses, thickening, pain, nipple discharge and skin changes.      Past Medical/Surgical History:  Past Medical History:   Diagnosis Date     Anxiety      Missed ab      Seizures (H) 1985    Seizure as a child. Took meds for 2 years.     UTI (urinary tract infection)      Past Surgical History:   Procedure Laterality Date     BIOPSY BREAST SEED LOCALIZATION Right 8/22/2018    Procedure: BIOPSY BREAST SEED LOCALIZATION;  RIGHT SEED LOCALIZED BREAST BIOPSY ;  Surgeon: Naomi Lewis MD;  Location: [facility]     DILATION AND CURETTAGE SUCTION  12/3/2012    Procedure: DILATION AND CURETTAGE SUCTION;  SUCTION DILATION AND CURETTAGE;  Surgeon: Nargis Gordon MD;  Location: [facility]     wisdom teeth[         Allergies:  Allergies as of 12/18/2020     (No Known Allergies)       Current Medications:  Current Outpatient Medications   Medication Sig Dispense Refill     cholecalciferol 25 MCG (1000 UT) TABS Take 1,000 Units by mouth       levETIRAcetam (KEPPRA) 500 MG tablet TAKE 1 TABLET BY MOUTH TWICE A DAY       vitamin C (ASCORBIC ACID) 500 MG tablet Take 500 mg by mouth       meclizine (ANTIVERT) 25 MG tablet Take 1 tablet (25 mg) by mouth 3 times daily as needed for dizziness (Patient not taking: Reported on 12/18/2020) 20 tablet 0        Family History:  Family History   Problem Relation Age of Onset     Hypertension Mother      Breast Cancer Sister 42     Breast Cancer Maternal Aunt 50     Esophageal Cancer Maternal Uncle 70       Social History:  Social History     Socioeconomic History     Marital status:      Spouse name: Jamison     Number of children: 2     Years of education: Not on file     Highest education level: Not on file   Occupational History     Employer: [employer]   Social Needs     Financial resource strain: Not on file     Food insecurity     Worry: Not on file     Inability: Not on file     Transportation needs     Medical: Not on file      Non-medical: Not on file   Tobacco Use     Smoking status: Never Smoker     Smokeless tobacco: Never Used   Substance and Sexual Activity     Alcohol use: Yes     Comment: occas     Drug use: No     Sexual activity: Yes     Partners: Male     Birth control/protection: Male Surgical     Comment:  with vasectomy   Lifestyle     Physical activity     Days per week: Not on file     Minutes per session: Not on file     Stress: Not on file   Relationships     Social connections     Talks on phone: Not on file     Gets together: Not on file     Attends Yarsanism service: Not on file     Active member of club or organization: Not on file     Attends meetings of clubs or organizations: Not on file     Relationship status: Not on file     Intimate partner violence     Fear of current or ex partner: Not on file     Emotionally abused: Not on file     Physically abused: Not on file     Forced sexual activity: Not on file   Other Topics Concern     Parent/sibling w/ CABG, MI or angioplasty before 65F 55M? Not Asked   Social History Narrative     Not on file       Physical Exam:  There were no vitals taken for this visit.  GENERAL: Healthy, alert and no distress  EYES: Eyes grossly normal to inspection.  No discharge or erythema, or obvious scleral/conjunctival abnormalities.  RESP: No audible wheeze, cough, or visible cyanosis.  No visible retractions or increased work of breathing.    SKIN: Visible skin clear. No significant rash, abnormal pigmentation or lesions.  NEURO: Cranial nerves grossly intact.  Mentation and speech appropriate for age.  PSYCH: Mentation appears normal, affect normal/bright, judgement and insight intact, normal speech and appearance well-groomed.    Laboratory/Imaging Studies  No results found for any visits on 12/18/20.    ASSESSMENT  We discussed her last screening tests and reviewed results.  We discussed concerns that she may be having a hysterectomy by the end of January to resolve her  uterine bleeding. She says the spotting has improved with the use of the IUD, but it has not resolved completely.    She has no breast complaints today.  She did have to stop taking the tamoxifen because she felt that it made her dizziness worse. She is now taking antevert and doing well with that in the last few months.    We reviewed her family history; there are no changes to report.       Individualized Surveillance Plan for women  With 20% or greater lifetime risk of breast cancer   Per [facility]N Breast Cancer Screening and Diagnosis Guidelines Version 1.2020   Recommended screening Test or procedure Last done Next Scheduled    Clinical encounter Clinical exam every 6-12 months.   Refer to genetic counseling if not already done.  Consider risk reduction strategies.   Exam deferred 12/18/2020 January 2022   However, some family histories with breast cancers at a very young age, may warrant screening starting earlier.    *May begin at age 40 if breast cancers in the family occur at later ages.    Annual mammogram beginning 10 years younger than the earliest breast cancer in the family but not prior to age 30.    Recommend annual breast MRI to begin 10 years younger than the earliest breast cancer in the family but not prior to age 25.    Breast MRIs are preferably done on day 7-15 of the menstrual cycle in premenopausal women. 5/6/2019- Breast MRI, BiRads2    12/12/2019- Screening tomosynthesis mammogram, BiRads1 1/7/2021- Screening mammogram    Breast MRI due in July 2021    Next exam: Jan. 2022 followed by screening tomosynthesis mammogram   Breast screening for patients at high risk due to thoracic radiation between the ages of 10-30   Annual clinical exam beginning 8 years after radiation therapy.    Annual screening mammogram beginning at age 30 or 8 years after radiation therapy    Annual breast MRI, beginning at age 25 or 8 years after radiation therapy.       NA     NA   Women who have a lifetime risk of  >20% based on history of LCIS or ADH/ALH Annual screening mammogram beginning at age of LCIS or ADH/ALH but not prior to age 30.    Consider annual MRI to begin at age of diagnosis of LCIS or ADH/ALH but not prior to age 25.    Consider risk reducing strategies.     NA     NA    Recommend risk reducing strategies for women with 1.7% 5 year risk of breast cancer. Took low dose tamoxifen for about 4 months in 2019; stopped due to dizziness, side effects      I spent 10 minutes with the patient with greater that 50% of it in counseling and coordinating care as documented above.    KUSH Nix-CNS, OCN, AGN-BC  Clinical Nurse Specialist  Cancer Risk Management Program  [facility] [address]  [place] [address]    phone:  [phone]  Pager: [phone]  fax: [phone]    CC: Johana Agrawal MD

## 2020-12-18 ENCOUNTER — VIRTUAL VISIT (OUTPATIENT)
Dept: ONCOLOGY | Facility: CLINIC | Age: 45
End: 2020-12-18
Attending: CLINICAL NURSE SPECIALIST
Payer: COMMERCIAL

## 2020-12-18 DIAGNOSIS — Z12.39 BREAST CANCER SCREENING, HIGH RISK PATIENT: Primary | ICD-10-CM

## 2020-12-18 DIAGNOSIS — R92.30 DENSE BREAST: ICD-10-CM

## 2020-12-18 PROCEDURE — 99212 OFFICE O/P EST SF 10 MIN: CPT | Mod: 95 | Performed by: CLINICAL NURSE SPECIALIST

## 2020-12-18 RX ORDER — ASCORBIC ACID 500 MG
500 TABLET ORAL
COMMUNITY

## 2020-12-18 RX ORDER — LEVETIRACETAM 500 MG/1
TABLET ORAL
COMMUNITY
Start: 2020-11-17

## 2020-12-18 SDOH — ECONOMIC STABILITY: INCOME INSECURITY: HOW HARD IS IT FOR YOU TO PAY FOR THE VERY BASICS LIKE FOOD, HOUSING, MEDICAL CARE, AND HEATING?: NOT ASKED

## 2020-12-18 SDOH — ECONOMIC STABILITY: TRANSPORTATION INSECURITY
IN THE PAST 12 MONTHS, HAS THE LACK OF TRANSPORTATION KEPT YOU FROM MEDICAL APPOINTMENTS OR FROM GETTING MEDICATIONS?: NOT ASKED

## 2020-12-18 SDOH — ECONOMIC STABILITY: FOOD INSECURITY: WITHIN THE PAST 12 MONTHS, THE FOOD YOU BOUGHT JUST DIDN'T LAST AND YOU DIDN'T HAVE MONEY TO GET MORE.: NOT ASKED

## 2020-12-18 SDOH — ECONOMIC STABILITY: FOOD INSECURITY: WITHIN THE PAST 12 MONTHS, YOU WORRIED THAT YOUR FOOD WOULD RUN OUT BEFORE YOU GOT MONEY TO BUY MORE.: NOT ASKED

## 2020-12-18 SDOH — ECONOMIC STABILITY: TRANSPORTATION INSECURITY
IN THE PAST 12 MONTHS, HAS LACK OF TRANSPORTATION KEPT YOU FROM MEETINGS, WORK, OR FROM GETTING THINGS NEEDED FOR DAILY LIVING?: NOT ASKED

## 2020-12-18 NOTE — PROGRESS NOTES
"Brittney Morris is a 45 year old female who is being evaluated via a billable video visit.      The patient has been notified of following:     \"This video visit will be conducted via a call between you and your physician/provider. We have found that certain health care needs can be provided without the need for an in-person physical exam.  This service lets us provide the care you need with a video conversation.  If a prescription is necessary we can send it directly to your pharmacy.  If lab work is needed we can place an order for that and you can then stop by our lab to have the test done at a later time.    Video visits are billed at different rates depending on your insurance coverage.  Please reach out to your insurance provider with any questions.    If during the course of the call the physician/provider feels a video visit is not appropriate, you will not be charged for this service.\"    Patient has given verbal consent for Video visit? Yes  How would you like to obtain your AVS? MyChart  If you are dropped from the video visit, the video invite should be resent to: Send to e-mail at: [email or web address]  Will anyone else be joining your video visit? No      I have reviewed and updated the patient's allergies and medication list.    Concerns: none  Refills: none    Vitals - Patient Reported  Weight (Patient Reported): 61.2 kg (135 lb)  Height (Patient Reported): 160 cm (5' 3\")  BMI (Based on Pt Reported Ht/Wt): 23.91  Pain Score: No Pain (0)    Margareth Kennedy CMA        Video-Visit Details    Type of service:  Video Visit    Video Start Time: 1503  Video End Time: 1513    Originating Location (pt. Location): Home    Distant Location (provider location):  [facility]     Platform used for Video Visit: Other: Started in [place]; converted to D[employer] via phone due to technical difficulties          "

## 2020-12-18 NOTE — LETTER
12/18/2020         RE: Brittney Morris  [address]  [facility] [address]        Dear Colleague,    Thank you for referring your patient, Brittney Morris, to the [facility]. Please see a copy of my visit note below.    Oncology Risk Management Consultation:  Date on this visit: 12/18/2020    Brittney Morris is participating in a billable video visit today due to the CoVid19 pandemic precautions. It was completed via [email or web address] as a telephone visit, due to the patient's technical difficulties.    She requires high risk screening and surveillance to reduce  her risk of cancer secondary to a family history of breast cancer in her sister at age 42, and a maternal aunt in her 50s,      She has a 28.9%  lifetime risk for breast cancer by the Bulmaro model and a 42.9% lifetime risk by the JACOB model. Her 5 year risk is 4.2% by the JACOB model.     Primary Physician:  Johana Agrawal MD    History Of Present Illness:  Ms. Morris is a very pleasant , healthy 45 year old female who presents with a family history of breast cancer.      Pertinent history:  Menarche at 12.  First child at 38.  12/3/2012 - Partial molar pregnancy.  Premenopausal.  Ovaries, fallopian tubes and uterus intact.  LMP:  unknown; issues with chronic spotting, currently using IUD to control bleeding  Breast Density: heterogeneously dense.  Hx of OCP use x3-4 years.  Vasectomy for birth control.  No hx of HRT.  6/7/2018: Right breast core biopsies x2:                 Site A: 1230 position, 2 cm from nipple measuring 0.6 cm biopsied.  An X shaped clip was placed                Site B; 0.6 cm ill-defined hypoechoic lesion at 12:00, 4 cm from the nipple was biopsied -Q shaped clip was placed   Percutaneous core needle biopsy, right:    A.  Benign intraductal papilloma  B: Fragments of a radial scar (complex sclerosing lesion (with associated calcifications.  There is no evidence of invasive malignancy on  this biopsy specimen.  Imaging reviewed with Dr. Santiago and Dr. Parisi who agreed the pathologic findings are concordant with radiologic findings.     2018 - Excisional biopsy, right breast: no evidence of residual radial scar, fibrocystic changes with usual ductal hyperplasia and microcalcifications. Negative for in situ or invasive carcinoma.  No hx of atypia or malignancy.   History of low dose tamoxifen from April-2019; stopped due to dizziness, side effects.     Genetic testin2019 NEGATIVE for mutations in the MARIIA, BRCA1, BRCA2, BRIP1, CDH1, CHEK2, EPCAM, MLH1, MSH2, MSH6, NBN, NF1, PALB2, PMS2, PTEN, RAD51C, RAD51D, STK11, and TP53 genes using a Custom Next-Cancer (19 genes, a combination of Gyn Plus and BRCA plus + NBN, STK11, and NF1) panel  from [facility].  No mutations were found in any of the 19 genes analyzed. This test involved sequencing and deletion/duplication analysis of all genes with the exception of EPCAM (deletions/duplications only). This testing was done because of her family history of her sister's breast cancer diagnosed at age 42, and her maternal aunt's breast cancer diagnosed in her 50's.     Pertinent screening history:  2014 right breast ultrasound for palpable right axillary lump. Patient is currently breast-feeding.  BI-RADS Category 1.       2015: Baseline screening mammogram, BI-RADS 1.     10/11/2016: Screening Tomosynthesis mammogram, BI-RADS 1  .  10/12/2017: Screening Tomosynthesis mammogram, BI-RADS 1.    2018 -- Digital mammogram diagnostic unilateral tomosynthesis and Ultrasound of the right medial breast:  An oval hypoechoic solid-appearing mass is present at the 12 o'clock position 4 cm from the nipple measuring 0.6 cm.  A round hypoechoic mass with through transmission is present at the 1230 position, 2 cm from the nipple measuring 0.6 cm which may represent a complicated cyst.  Scattered tiny benign cysts are noted in the  medial right breast. BI-RADS category: 4 suspicious -ultrasound-guided biopsy recommended.  12/5/2018- Screening tomosynthesis mammogram, BiRads2  5/6/2019- Breast MRI, BiRads2  12/12/2019- Screening tomosynthesis mammogram, BiRads1    At this visit, she denies new fatigue, breast asymmetry, lumps, masses, thickening, pain, nipple discharge and skin changes.      Past Medical/Surgical History:  Past Medical History:   Diagnosis Date     Anxiety      Missed ab      Seizures (H) 1985    Seizure as a child. Took meds for 2 years.     UTI (urinary tract infection)      Past Surgical History:   Procedure Laterality Date     BIOPSY BREAST SEED LOCALIZATION Right 8/22/2018    Procedure: BIOPSY BREAST SEED LOCALIZATION;  RIGHT SEED LOCALIZED BREAST BIOPSY ;  Surgeon: Naomi Lewis MD;  Location: [facility]     DILATION AND CURETTAGE SUCTION  12/3/2012    Procedure: DILATION AND CURETTAGE SUCTION;  SUCTION DILATION AND CURETTAGE;  Surgeon: Nargis Gordon MD;  Location: [facility]     wisdom teeth[         Allergies:  Allergies as of 12/18/2020     (No Known Allergies)       Current Medications:  Current Outpatient Medications   Medication Sig Dispense Refill     cholecalciferol 25 MCG (1000 UT) TABS Take 1,000 Units by mouth       levETIRAcetam (KEPPRA) 500 MG tablet TAKE 1 TABLET BY MOUTH TWICE A DAY       vitamin C (ASCORBIC ACID) 500 MG tablet Take 500 mg by mouth       meclizine (ANTIVERT) 25 MG tablet Take 1 tablet (25 mg) by mouth 3 times daily as needed for dizziness (Patient not taking: Reported on 12/18/2020) 20 tablet 0        Family History:  Family History   Problem Relation Age of Onset     Hypertension Mother      Breast Cancer Sister 42     Breast Cancer Maternal Aunt 50     Esophageal Cancer Maternal Uncle 70       Social History:  Social History     Socioeconomic History     Marital status:      Spouse name: Jamison     Number of children: 2     Years of education: Not on file     Highest  education level: Not on file   Occupational History     Employer: [employer]   Social Needs     Financial resource strain: Not on file     Food insecurity     Worry: Not on file     Inability: Not on file     Transportation needs     Medical: Not on file     Non-medical: Not on file   Tobacco Use     Smoking status: Never Smoker     Smokeless tobacco: Never Used   Substance and Sexual Activity     Alcohol use: Yes     Comment: occas     Drug use: No     Sexual activity: Yes     Partners: Male     Birth control/protection: Male Surgical     Comment:  with vasectomy   Lifestyle     Physical activity     Days per week: Not on file     Minutes per session: Not on file     Stress: Not on file   Relationships     Social connections     Talks on phone: Not on file     Gets together: Not on file     Attends Cheondoism service: Not on file     Active member of club or organization: Not on file     Attends meetings of clubs or organizations: Not on file     Relationship status: Not on file     Intimate partner violence     Fear of current or ex partner: Not on file     Emotionally abused: Not on file     Physically abused: Not on file     Forced sexual activity: Not on file   Other Topics Concern     Parent/sibling w/ CABG, MI or angioplasty before 65F 55M? Not Asked   Social History Narrative     Not on file       Physical Exam:  There were no vitals taken for this visit.  GENERAL: Healthy, alert and no distress  EYES: Eyes grossly normal to inspection.  No discharge or erythema, or obvious scleral/conjunctival abnormalities.  RESP: No audible wheeze, cough, or visible cyanosis.  No visible retractions or increased work of breathing.    SKIN: Visible skin clear. No significant rash, abnormal pigmentation or lesions.  NEURO: Cranial nerves grossly intact.  Mentation and speech appropriate for age.  PSYCH: Mentation appears normal, affect normal/bright, judgement and insight intact, normal speech and appearance  well-groomed.    Laboratory/Imaging Studies  No results found for any visits on 12/18/20.    ASSESSMENT  We discussed her last screening tests and reviewed results.  We discussed concerns that she may be having a hysterectomy by the end of January to resolve her uterine bleeding. She says the spotting has improved with the use of the IUD, but it has not resolved completely.    She has no breast complaints today.  She did have to stop taking the tamoxifen because she felt that it made her dizziness worse. She is now taking antevert and doing well with that in the last few months.    We reviewed her family history; there are no changes to report.       Individualized Surveillance Plan for women  With 20% or greater lifetime risk of breast cancer   Per [facility]CCN Breast Cancer Screening and Diagnosis Guidelines Version 1.2020   Recommended screening Test or procedure Last done Next Scheduled    Clinical encounter Clinical exam every 6-12 months.   Refer to genetic counseling if not already done.  Consider risk reduction strategies.   Exam deferred 12/18/2020 January 2022   However, some family histories with breast cancers at a very young age, may warrant screening starting earlier.    *May begin at age 40 if breast cancers in the family occur at later ages.    Annual mammogram beginning 10 years younger than the earliest breast cancer in the family but not prior to age 30.    Recommend annual breast MRI to begin 10 years younger than the earliest breast cancer in the family but not prior to age 25.    Breast MRIs are preferably done on day 7-15 of the menstrual cycle in premenopausal women. 5/6/2019- Breast MRI, BiRads2    12/12/2019- Screening tomosynthesis mammogram, BiRads1 1/7/2021- Screening mammogram    Breast MRI due in July 2021    Next exam: Jan. 2022 followed by screening tomosynthesis mammogram   Breast screening for patients at high risk due to thoracic radiation between the ages of 10-30   Annual clinical  "exam beginning 8 years after radiation therapy.    Annual screening mammogram beginning at age 30 or 8 years after radiation therapy    Annual breast MRI, beginning at age 25 or 8 years after radiation therapy.       NA     NA   Women who have a lifetime risk of >20% based on history of LCIS or ADH/ALH Annual screening mammogram beginning at age of LCIS or ADH/ALH but not prior to age 30.    Consider annual MRI to begin at age of diagnosis of LCIS or ADH/ALH but not prior to age 25.    Consider risk reducing strategies.     NA     NA    Recommend risk reducing strategies for women with 1.7% 5 year risk of breast cancer. Took low dose tamoxifen for about 4 months in 2019; stopped due to dizziness, side effects      I spent 10 minutes with the patient with greater that 50% of it in counseling and coordinating care as documented above.    KUSH Nix-CNS, OCN, AGN-BC  Clinical Nurse Specialist  Cancer Risk Management Program  [facility] [address]  [place] [address]    phone:  [phone]  Pager: [phone]  fax: [phone]    CC: Johana Agrawal MD                    Brittney Morris is a 45 year old female who is being evaluated via a billable video visit.      The patient has been notified of following:     \"This video visit will be conducted via a call between you and your physician/provider. We have found that certain health care needs can be provided without the need for an in-person physical exam.  This service lets us provide the care you need with a video conversation.  If a prescription is necessary we can send it directly to your pharmacy.  If lab work is needed we can place an order for that and you can then stop by our lab to have the test done at a later time.    Video visits are billed at different rates depending on your insurance coverage.  Please reach out to your insurance provider with any questions.    If during the course of the call " "the physician/provider feels a video visit is not appropriate, you will not be charged for this service.\"    Patient has given verbal consent for Video visit? Yes  How would you like to obtain your AVS? MyChart  If you are dropped from the video visit, the video invite should be resent to: Send to e-mail at: [email or web address]  Will anyone else be joining your video visit? No      I have reviewed and updated the patient's allergies and medication list.    Concerns: none  Refills: none    Vitals - Patient Reported  Weight (Patient Reported): 61.2 kg (135 lb)  Height (Patient Reported): 160 cm (5' 3\")  BMI (Based on Pt Reported Ht/Wt): 23.91  Pain Score: No Pain (0)    Margareth Kennedy CMA        Video-Visit Details    Type of service:  Video Visit    Video Start Time: 1503  Video End Time: 1513    Originating Location (pt. Location): Home    Distant Location (provider location):  [facility]     Platform used for Video Visit: Other: Started in [facility]; converted to [employer] via phone due to technical difficulties      Again, thank you for allowing me to participate in the care of your patient.        Sincerely,        KUSH Nix CNS    "

## 2021-01-07 ENCOUNTER — ANCILLARY PROCEDURE (OUTPATIENT)
Dept: MAMMOGRAPHY | Facility: CLINIC | Age: 46
End: 2021-01-07
Payer: COMMERCIAL

## 2021-01-07 DIAGNOSIS — Z12.39 BREAST CANCER SCREENING, HIGH RISK PATIENT: ICD-10-CM

## 2021-01-07 DIAGNOSIS — R92.30 DENSE BREAST: ICD-10-CM

## 2021-01-07 PROCEDURE — 77063 BREAST TOMOSYNTHESIS BI: CPT | Mod: GC | Performed by: STUDENT IN AN ORGANIZED HEALTH CARE EDUCATION/TRAINING PROGRAM

## 2021-01-07 PROCEDURE — 77067 SCR MAMMO BI INCL CAD: CPT | Mod: GC | Performed by: STUDENT IN AN ORGANIZED HEALTH CARE EDUCATION/TRAINING PROGRAM

## 2021-01-11 ENCOUNTER — ANCILLARY PROCEDURE (OUTPATIENT)
Dept: MAMMOGRAPHY | Facility: CLINIC | Age: 46
End: 2021-01-11
Attending: CLINICAL NURSE SPECIALIST
Payer: COMMERCIAL

## 2021-01-11 DIAGNOSIS — R92.8 ABNORMAL FINDING ON BREAST IMAGING: Primary | ICD-10-CM

## 2021-01-11 DIAGNOSIS — R92.8 ABNORMAL MAMMOGRAM OF RIGHT BREAST: ICD-10-CM

## 2021-01-11 PROCEDURE — 77065 DX MAMMO INCL CAD UNI: CPT | Mod: RT | Performed by: RADIOLOGY

## 2021-01-12 ENCOUNTER — TELEPHONE (OUTPATIENT)
Dept: ONCOLOGY | Facility: CLINIC | Age: 46
End: 2021-01-12

## 2021-01-15 DIAGNOSIS — R92.8 ABNORMAL FINDING ON BREAST IMAGING: ICD-10-CM

## 2021-01-15 LAB
LABORATORY COMMENT REPORT: NORMAL
SARS-COV-2 RNA RESP QL NAA+PROBE: NEGATIVE
SARS-COV-2 RNA RESP QL NAA+PROBE: NORMAL
SPECIMEN SOURCE: NORMAL
SPECIMEN SOURCE: NORMAL

## 2021-01-15 PROCEDURE — U0005 INFEC AGEN DETEC AMPLI PROBE: HCPCS | Performed by: CLINICAL NURSE SPECIALIST

## 2021-01-15 PROCEDURE — U0003 INFECTIOUS AGENT DETECTION BY NUCLEIC ACID (DNA OR RNA); SEVERE ACUTE RESPIRATORY SYNDROME CORONAVIRUS 2 (SARS-COV-2) (CORONAVIRUS DISEASE [COVID-19]), AMPLIFIED PROBE TECHNIQUE, MAKING USE OF HIGH THROUGHPUT TECHNOLOGIES AS DESCRIBED BY CMS-2020-01-R: HCPCS | Performed by: CLINICAL NURSE SPECIALIST

## 2021-01-19 ENCOUNTER — ANCILLARY PROCEDURE (OUTPATIENT)
Dept: MAMMOGRAPHY | Facility: CLINIC | Age: 46
End: 2021-01-19
Attending: CLINICAL NURSE SPECIALIST
Payer: COMMERCIAL

## 2021-01-19 DIAGNOSIS — R92.1 BREAST CALCIFICATIONS: Primary | ICD-10-CM

## 2021-01-19 DIAGNOSIS — R92.8 ABNORMAL FINDING ON BREAST IMAGING: ICD-10-CM

## 2021-01-19 PROCEDURE — 88305 TISSUE EXAM BY PATHOLOGIST: CPT | Performed by: PATHOLOGY

## 2021-01-19 PROCEDURE — 19081 BX BREAST 1ST LESION STRTCTC: CPT | Mod: RT | Performed by: RADIOLOGY

## 2021-01-19 RX ORDER — LIDOCAINE HYDROCHLORIDE 10 MG/ML
10 INJECTION, SOLUTION EPIDURAL; INFILTRATION; INTRACAUDAL; PERINEURAL ONCE
Status: COMPLETED | OUTPATIENT
Start: 2021-01-19 | End: 2021-01-19

## 2021-01-19 RX ORDER — LIDOCAINE HYDROCHLORIDE AND EPINEPHRINE 10; 10 MG/ML; UG/ML
10 INJECTION, SOLUTION INFILTRATION; PERINEURAL ONCE
Status: COMPLETED | OUTPATIENT
Start: 2021-01-19 | End: 2021-01-19

## 2021-01-19 RX ADMIN — LIDOCAINE HYDROCHLORIDE 10 ML: 10 INJECTION, SOLUTION EPIDURAL; INFILTRATION; INTRACAUDAL; PERINEURAL at 09:17

## 2021-01-19 RX ADMIN — LIDOCAINE HYDROCHLORIDE AND EPINEPHRINE 10 ML: 10; 10 INJECTION, SOLUTION INFILTRATION; PERINEURAL at 09:17

## 2021-01-20 ENCOUNTER — TELEPHONE (OUTPATIENT)
Dept: ONCOLOGY | Facility: CLINIC | Age: 46
End: 2021-01-20

## 2021-01-20 DIAGNOSIS — D24.1 PAPILLOMA OF RIGHT BREAST: ICD-10-CM

## 2021-01-20 DIAGNOSIS — R92.8 ABNORMAL FINDING ON BREAST IMAGING: Primary | ICD-10-CM

## 2021-01-20 LAB — COPATH REPORT: NORMAL

## 2021-01-21 ENCOUNTER — HOSPITAL PATHOLOGY (OUTPATIENT)
Dept: OTHER | Facility: CLINIC | Age: 46
End: 2021-01-21

## 2021-01-22 LAB — COPATH REPORT: NORMAL

## 2021-02-04 ENCOUNTER — TELEPHONE (OUTPATIENT)
Dept: ONCOLOGY | Facility: CLINIC | Age: 46
End: 2021-02-04

## 2021-04-18 ENCOUNTER — HEALTH MAINTENANCE LETTER (OUTPATIENT)
Age: 46
End: 2021-04-18

## 2021-07-02 ENCOUNTER — ANCILLARY PROCEDURE (OUTPATIENT)
Dept: MRI IMAGING | Facility: CLINIC | Age: 46
End: 2021-07-02
Attending: CLINICAL NURSE SPECIALIST
Payer: COMMERCIAL

## 2021-07-02 ENCOUNTER — ANCILLARY PROCEDURE (OUTPATIENT)
Dept: MAMMOGRAPHY | Facility: CLINIC | Age: 46
End: 2021-07-02
Attending: CLINICAL NURSE SPECIALIST
Payer: COMMERCIAL

## 2021-07-02 DIAGNOSIS — R92.8 ABNORMAL FINDING ON BREAST IMAGING: ICD-10-CM

## 2021-07-02 DIAGNOSIS — R92.30 DENSE BREAST: ICD-10-CM

## 2021-07-02 DIAGNOSIS — Z12.39 BREAST CANCER SCREENING, HIGH RISK PATIENT: ICD-10-CM

## 2021-07-02 DIAGNOSIS — D24.1 PAPILLOMA OF RIGHT BREAST: ICD-10-CM

## 2021-07-02 PROCEDURE — A9585 GADOBUTROL INJECTION: HCPCS | Performed by: RADIOLOGY

## 2021-07-02 PROCEDURE — 77065 DX MAMMO INCL CAD UNI: CPT | Mod: RT | Performed by: RADIOLOGY

## 2021-07-02 PROCEDURE — 77061 BREAST TOMOSYNTHESIS UNI: CPT | Mod: RT | Performed by: RADIOLOGY

## 2021-07-02 PROCEDURE — 77049 MRI BREAST C-+ W/CAD BI: CPT | Performed by: RADIOLOGY

## 2021-07-02 RX ORDER — GADOBUTROL 604.72 MG/ML
7.5 INJECTION INTRAVENOUS ONCE
Status: COMPLETED | OUTPATIENT
Start: 2021-07-02 | End: 2021-07-02

## 2021-07-02 RX ADMIN — GADOBUTROL 6 ML: 604.72 INJECTION INTRAVENOUS at 15:26

## 2021-07-02 NOTE — DISCHARGE INSTRUCTIONS
MRI Contrast Discharge Instructions    The IV contrast you received today will pass out of your body in your  urine. This will happen in the next 24 hours. You will not feel this process.  Your urine will not change color.    Drink at least 4 extra glasses of water or juice today (unless your doctor  has restricted your fluids). This reduces the stress on your kidneys.  You may take your regular medicines.    If you are on dialysis: It is best to have dialysis today.    If you have a reaction: Most reactions happen right away. If you have  any new symptoms after leaving the hospital (such as hives or swelling),  call your hospital at the correct number below. Or call your family doctor.  If you have breathing distress or wheezing, call 911.    Special instructions: ***    I have read and understand the above information.    Signature:______________________________________ Date:___________    Staff:__________________________________________ Date:___________     Time:__________    [facility] Departments:    ___[facility]: [phone]  ___[facility]: [phone]  ___[place]: [phone] ___[facility]: [phone]  ___[facility]: [phone]  ___[facility]: [phone]  ___[phone]  ___[facility]: [phone]  ___[phone]

## 2021-07-06 ENCOUNTER — VIRTUAL VISIT (OUTPATIENT)
Dept: ONCOLOGY | Facility: CLINIC | Age: 46
End: 2021-07-06
Attending: CLINICAL NURSE SPECIALIST
Payer: COMMERCIAL

## 2021-07-06 DIAGNOSIS — Z12.39 BREAST CANCER SCREENING, HIGH RISK PATIENT: Primary | ICD-10-CM

## 2021-07-06 DIAGNOSIS — R92.30 DENSE BREAST: ICD-10-CM

## 2021-07-06 PROBLEM — D23.9 DYSPLASTIC NEVI: Status: ACTIVE | Noted: 2019-03-14

## 2021-07-06 PROBLEM — K21.9 GERD (GASTROESOPHAGEAL REFLUX DISEASE): Status: ACTIVE | Noted: 2018-08-21

## 2021-07-06 PROBLEM — E56.9 VITAMIN DEFICIENCY: Status: ACTIVE | Noted: 2018-08-21

## 2021-07-06 PROCEDURE — 99213 OFFICE O/P EST LOW 20 MIN: CPT | Mod: 95 | Performed by: CLINICAL NURSE SPECIALIST

## 2021-07-06 PROCEDURE — 999N001193 HC VIDEO/TELEPHONE VISIT; NO CHARGE

## 2021-07-06 NOTE — PROGRESS NOTES
"Brtitney is a 45 year old who is being evaluated via a billable telephone visit.      What phone number would you like to be contacted at? [phone]  How would you like to obtain your AVS? MyChart     Vitals - Patient Reported  Weight (Patient Reported): 61.2 kg (135 lb)  Height (Patient Reported): 160 cm (5' 3\")  BMI (Based on Pt Reported Ht/Wt): 23.91  Pain Score: No Pain (0)    Becka HALL    Phone call duration: 9 minutes    Oncology Risk Management Consultation:  Date on this visit: 7/6/2021    Brittney Morris is paretipcainign in a billable telephone visit today due to CoVid19 pandemic precaustions.   She requires high risk screening and surveillance to reduce  her risk of cancer secondary to a family history of breast cancer in her sister at age 42, and a maternal aunt in her 50s,       She has a 28.9%  lifetime risk for breast cancer by the Bulmaro model and a 42.9% lifetime risk by the JACOB model. Her 5 year risk is 4.2% by the JACOB model.     Primary Physician:  Johana Agrawal MD     History Of Present Illness:  Ms. Morris is a very pleasant , healthy 45 year old female who presents with a family history of breast cancer.      Pertinent history:  Menarche at 12.  First child at 38.  12/3/2012 - Partial molar pregnancy.  Premenopausal.  Ovaries, fallopian tubes and uterus intact.  LMP:  unknown; issues with chronic spotting, currently using IUD to control bleeding  Breast Density: heterogeneously dense.  Hx of OCP use x3-4 years.  Vasectomy for birth control.  No hx of HRT.  6/7/2018: Right breast core biopsies x2:                 Site A: 1230 position, 2 cm from nipple measuring 0.6 cm biopsied.  An X shaped clip was placed                Site B; 0.6 cm ill-defined hypoechoic lesion at 12:00, 4 cm from the nipple was biopsied -Q shaped clip was placed   Percutaneous core needle biopsy, right:    A.  Benign intraductal papilloma  B: Fragments of a radial scar (complex sclerosing lesion (with " associated calcifications.  There is no evidence of invasive malignancy on this biopsy specimen.  Imaging reviewed with Dr. Santiago and Dr. Parisi who agreed the pathologic findings are concordant with radiologic findings.     2018 - Excisional biopsy, right breast: no evidence of residual radial scar, fibrocystic changes with usual ductal hyperplasia and microcalcifications. Negative for in situ or invasive carcinoma.  No hx of atypia or malignancy.   2021- Right stereotactic core biopsy, Pathology results:  RIGHT BREAST, 9:00, MID DEPTH, STEREOTACTIC CORE BIOPSY:   - Fibrocystic change (including apocrine metaplasia and microcysts),   columnar cell change, adenomyoepithelial   adenosis (focal), and usual duct hyperplasia (UDH).   - Benign intraductal papilloma (2 mm).   - Calcifications associated with benign acini.   - Negative for atypia or malignancy.   History of low dose tamoxifen from April-2019; stopped due to dizziness, side effects.     Genetic testin2019 NEGATIVE for mutations in the MARIIA, BRCA1, BRCA2, BRIP1, CDH1, CHEK2, EPCAM, MLH1, MSH2, MSH6, NBN, NF1, PALB2, PMS2, PTEN, RAD51C, RAD51D, STK11, and TP53 genes using a Custom Next-Cancer (19 genes, a combination of Gyn Plus and BRCA plus + NBN, STK11, and NF1) panel  from [employer].  No mutations were found in any of the 19 genes analyzed. This test involved sequencing and deletion/duplication analysis of all genes with the exception of EPCAM (deletions/duplications only). This testing was done because of her family history of her sister's breast cancer diagnosed at age 42, and her maternal aunt's breast cancer diagnosed in her 50's.     Pertinent screening history:  2014 right breast ultrasound for palpable right axillary lump. Patient is currently breast-feeding.  BI-RADS Category 1.     2015: Baseline screening mammogram, BI-RADS 1.   10/11/2016: Screening Tomosynthesis mammogram, BI-RADS 1  .10/12/2017:  Screening Tomosynthesis mammogram, BI-RADS 1.   6/6/2018 -- Digital mammogram diagnostic unilateral tomosynthesis and Ultrasound of the right medial breast:  An oval hypoechoic solid-appearing mass is present at the 12 o'clock position 4 cm from the nipple measuring 0.6 cm.  A round hypoechoic mass with through transmission is present at the 1230 position, 2 cm from the nipple measuring 0.6 cm which may represent a complicated cyst.  Scattered tiny benign cysts are noted in the medial right breast. BI-RADS category: 4 suspicious -ultrasound-guided biopsy recommended.  12/5/2018- Screening tomosynthesis mammogram, BiRads2  5/6/2019- Breast MRI, BiRads2  12/12/2019- Screening tomosynthesis mammogram, BiRads1  1/7/2021- Screening tomosynthesis mammogram, BiRads0 for Postbiopsy changes right breast. Possible developing  calcifications mid depth right breast just lateral to central core mid  depth. (CC josafat 30/72). No significant change left breast.  1/11/2021- Diagnostic tomosynthesis mammogram, BiRads4 for group of amorphous calcifications in the slightly upper slightly outer right breast at mid depth posterior to the site of prior excision, for which additional evaluation with stereotactic guided biopsy is indicated.  1/19/2021- Biopsy:  2 mm benign papilloma.   7/2/2021- Breast MRI and diagnostic tomosyntheiss mammogram of right breast, both BiRads1     At this visit, she denies new fatigue, breast asymmetry, lumps, masses, thickening, pain, nipple discharge and skin changes.         Past Medical/Surgical History:  Past Medical History:   Diagnosis Date     Anxiety      Missed ab      Seizures (H) 1985    Seizure as a child. Took meds for 2 years.     UTI (urinary tract infection)      Past Surgical History:   Procedure Laterality Date     BIOPSY BREAST SEED LOCALIZATION Right 8/22/2018    Procedure: BIOPSY BREAST SEED LOCALIZATION;  RIGHT SEED LOCALIZED BREAST BIOPSY ;  Surgeon: Naomi Lewis MD;  Location:   [facility]     DILATION AND CURETTAGE SUCTION  12/3/2012    Procedure: DILATION AND CURETTAGE SUCTION;  SUCTION DILATION AND CURETTAGE;  Surgeon: Nargis Gordon MD;  Location: [facility]     wisdom teeth[         Allergies:  Allergies as of 07/06/2021     (No Known Allergies)       Current Medications:  Current Outpatient Medications   Medication Sig Dispense Refill     cholecalciferol 25 MCG (1000 UT) TABS Take 1,000 Units by mouth       levETIRAcetam (KEPPRA) 500 MG tablet TAKE 1 TABLET BY MOUTH TWICE A DAY       meclizine (ANTIVERT) 25 MG tablet Take 1 tablet (25 mg) by mouth 3 times daily as needed for dizziness (Patient not taking: Reported on 12/18/2020) 20 tablet 0     vitamin C (ASCORBIC ACID) 500 MG tablet Take 500 mg by mouth          Family History:  Family History   Problem Relation Age of Onset     Hypertension Mother      Breast Cancer Sister 42     Breast Cancer Maternal Aunt 50     Esophageal Cancer Maternal Uncle 70       Social History:  Social History     Socioeconomic History     Marital status:      Spouse name: Jamison     Number of children: 2     Years of education: Not on file     Highest education level: Not on file   Occupational History     Employer: [employer]   Social Needs     Financial resource strain: Not on file     Food insecurity     Worry: Not on file     Inability: Not on file     Transportation needs     Medical: Not on file     Non-medical: Not on file   Tobacco Use     Smoking status: Never Smoker     Smokeless tobacco: Never Used   Substance and Sexual Activity     Alcohol use: Yes     Comment: occas     Drug use: No     Sexual activity: Yes     Partners: Male     Birth control/protection: Male Surgical     Comment:  with vasectomy   Lifestyle     Physical activity     Days per week: Not on file     Minutes per session: Not on file     Stress: Not on file   Relationships     Social connections     Talks on phone: Not on file     Gets together: Not on  file     Attends Episcopalian service: Not on file     Active member of club or organization: Not on file     Attends meetings of clubs or organizations: Not on file     Relationship status: Not on file     Intimate partner violence     Fear of current or ex partner: Not on file     Emotionally abused: Not on file     Physically abused: Not on file     Forced sexual activity: Not on file   Other Topics Concern     Parent/sibling w/ CABG, MI or angioplasty before 65F 55M? Not Asked   Social History Narrative     Not on file       Physical Exam:  There were no vitals taken for this visit.  Physical exam deferred due to telephone visit.     Laboratory/Imaging Studies  No results found for any visits on 07/06/21.    ASSESSMENT  We discussed her pathology as benign and not needing followup at this time.  We discussed that I had consulted with Dr. Martínez, who is in agreement that the papilloma is too small to warrant surgery but that we should continue close surveillance.     We also revisited her family history; there are no changes to report.  She continues self exams and denies any issues (lumps, nipple discharge, skin puckering, nipple inversion). We will continue surveillance, per below.  Individualized Surveillance Plan for women  With 20% or greater lifetime risk of breast cancer   Per NCCN Breast Cancer Screening and Diagnosis Guidelines Version 1.2021   Recommended screening Test or procedure Last done Next Scheduled    Clinical encounter Clinical exam every 6-12 months.   Refer to genetic counseling if not already done.  Consider risk reduction strategies.   7/6/2021- exam deferred   January 2022   However, some family histories with breast cancers at a very young age, may warrant screening starting earlier.    *May begin at age 40 if breast cancers in the family occur at later ages.    Annual mammogram beginning 10 years younger than the earliest breast cancer in the family but not prior to age  30.    Recommend annual breast MRI to begin 10 years younger than the earliest breast cancer in the family but not prior to age 25.    Breast MRIs are preferably done on day 7-15 of the menstrual cycle in premenopausal women. 7/2/2021- Breast MRI and diagnostic tomosyntheiss mammogram of right breast, both BiRads1 Screening mammogram January 2022 after exam (1/8 or later)   Breast screening for patients at high risk due to thoracic radiation between the ages of 10-30   Annual clinical exam beginning 8 years after radiation therapy.    Annual screening mammogram beginning at age 30 or 8 years after radiation therapy    Annual breast MRI, beginning at age 25 or 8 years after radiation therapy.       NA     NA   Women who have a lifetime risk of >20% based on history of LCIS or ADH/ALH Annual screening mammogram beginning at age of LCIS or ADH/ALH but not prior to age 30.    Consider annual MRI to begin at age of diagnosis of LCIS or ADH/ALH but not prior to age 25.    Consider risk reducing strategies.     NA     NA    Recommend risk reducing strategies for women with 1.7% 5 year risk of breast cancer. Tried tamoxifen; stopped due to side effects      I spent a total of 22 minutes on the day of the visit. Please see the note for further information on patient assessment and treatment.    KUSH Nix-CNS, OCN, AGN-BC  Clinical Nurse Specialist  Cancer Risk Management Program  [facility] [address]  [place] [address]    phone:  [phone]  Pager: [phone]  fax: [phone]    CC:.  MD Naomi Lockett MD

## 2021-07-06 NOTE — LETTER
7/6/2021         RE: Brittney Morris  [address]  [facility] [address]        Dear Colleague,    Thank you for referring your patient, Brittney Morris, to the [facility]. Please see a copy of my visit note below.    Oncology Risk Management Consultation:  Date on this visit: 7/6/2021    Brittney Morris is paretipcainign in a billable telephone visit today due to CoVid19 pandemic precaustions.   She requires high risk screening and surveillance to reduce  her risk of cancer secondary to a family history of breast cancer in her sister at age 42, and a maternal aunt in her 50s,       She has a 28.9%  lifetime risk for breast cancer by the Bulmaro model and a 42.9% lifetime risk by the JACOB model. Her 5 year risk is 4.2% by the JACOB model.     Primary Physician:  Johana Agrawal MD     History Of Present Illness:  Ms. Morris is a very pleasant , healthy 45 year old female who presents with a family history of breast cancer.      Pertinent history:  Menarche at 12.  First child at 38.  12/3/2012 - Partial molar pregnancy.  Premenopausal.  Ovaries, fallopian tubes and uterus intact.  LMP:  unknown; issues with chronic spotting, currently using IUD to control bleeding  Breast Density: heterogeneously dense.  Hx of OCP use x3-4 years.  Vasectomy for birth control.  No hx of HRT.  6/7/2018: Right breast core biopsies x2:                 Site A: 1230 position, 2 cm from nipple measuring 0.6 cm biopsied.  An X shaped clip was placed                Site B; 0.6 cm ill-defined hypoechoic lesion at 12:00, 4 cm from the nipple was biopsied -Q shaped clip was placed   Percutaneous core needle biopsy, right:    A.  Benign intraductal papilloma  B: Fragments of a radial scar (complex sclerosing lesion (with associated calcifications.  There is no evidence of invasive malignancy on this biopsy specimen.  Imaging reviewed with Dr. aSntiago and Dr. Parisi who agreed the  pathologic findings are concordant with radiologic findings.     2018 - Excisional biopsy, right breast: no evidence of residual radial scar, fibrocystic changes with usual ductal hyperplasia and microcalcifications. Negative for in situ or invasive carcinoma.  No hx of atypia or malignancy.   2021- Right stereotactic core biopsy, Pathology results:  RIGHT BREAST, 9:00, MID DEPTH, STEREOTACTIC CORE BIOPSY:   - Fibrocystic change (including apocrine metaplasia and microcysts),   columnar cell change, adenomyoepithelial   adenosis (focal), and usual duct hyperplasia (UDH).   - Benign intraductal papilloma (2 mm).   - Calcifications associated with benign acini.   - Negative for atypia or malignancy.   History of low dose tamoxifen from April-2019; stopped due to dizziness, side effects.     Genetic testin2019 NEGATIVE for mutations in the MARIIA, BRCA1, BRCA2, BRIP1, CDH1, CHEK2, EPCAM, MLH1, MSH2, MSH6, NBN, NF1, PALB2, PMS2, PTEN, RAD51C, RAD51D, STK11, and TP53 genes using a Custom Next-Cancer (19 genes, a combination of Gyn Plus and BRCA plus + NBN, STK11, and NF1) panel  from [employer].  No mutations were found in any of the 19 genes analyzed. This test involved sequencing and deletion/duplication analysis of all genes with the exception of EPCAM (deletions/duplications only). This testing was done because of her family history of her sister's breast cancer diagnosed at age 42, and her maternal aunt's breast cancer diagnosed in her 50's.     Pertinent screening history:  2014 right breast ultrasound for palpable right axillary lump. Patient is currently breast-feeding.  BI-RADS Category 1.     2015: Baseline screening mammogram, BI-RADS 1.   10/11/2016: Screening Tomosynthesis mammogram, BI-RADS 1  .10/12/2017: Screening Tomosynthesis mammogram, BI-RADS 1.   2018 -- Digital mammogram diagnostic unilateral tomosynthesis and Ultrasound of the right medial breast:  An oval  hypoechoic solid-appearing mass is present at the 12 o'clock position 4 cm from the nipple measuring 0.6 cm.  A round hypoechoic mass with through transmission is present at the 1230 position, 2 cm from the nipple measuring 0.6 cm which may represent a complicated cyst.  Scattered tiny benign cysts are noted in the medial right breast. BI-RADS category: 4 suspicious -ultrasound-guided biopsy recommended.  12/5/2018- Screening tomosynthesis mammogram, BiRads2  5/6/2019- Breast MRI, BiRads2  12/12/2019- Screening tomosynthesis mammogram, BiRads1  1/7/2021- Screening tomosynthesis mammogram, BiRads0 for Postbiopsy changes right breast. Possible developing  calcifications mid depth right breast just lateral to central core mid  depth. (CC josafat 30/72). No significant change left breast.  1/11/2021- Diagnostic tomosynthesis mammogram, BiRads4 for group of amorphous calcifications in the slightly upper slightly outer right breast at mid depth posterior to the site of prior excision, for which additional evaluation with stereotactic guided biopsy is indicated.  1/19/2021- Biopsy:  2 mm benign papilloma.   7/2/2021- Breast MRI and diagnostic tomosyntheiss mammogram of right breast, both BiRads1     At this visit, she denies new fatigue, breast asymmetry, lumps, masses, thickening, pain, nipple discharge and skin changes.         Past Medical/Surgical History:  Past Medical History:   Diagnosis Date     Anxiety      Missed ab      Seizures (H) 1985    Seizure as a child. Took meds for 2 years.     UTI (urinary tract infection)      Past Surgical History:   Procedure Laterality Date     BIOPSY BREAST SEED LOCALIZATION Right 8/22/2018    Procedure: BIOPSY BREAST SEED LOCALIZATION;  RIGHT SEED LOCALIZED BREAST BIOPSY ;  Surgeon: Naomi Lewis MD;  Location: [facility]     DILATION AND CURETTAGE SUCTION  12/3/2012    Procedure: DILATION AND CURETTAGE SUCTION;  SUCTION DILATION AND CURETTAGE;  Surgeon: Nargis Gordon  MD Xiomara;  Location: [facility]     wisdom teeth[         Allergies:  Allergies as of 07/06/2021     (No Known Allergies)       Current Medications:  Current Outpatient Medications   Medication Sig Dispense Refill     cholecalciferol 25 MCG (1000 UT) TABS Take 1,000 Units by mouth       levETIRAcetam (KEPPRA) 500 MG tablet TAKE 1 TABLET BY MOUTH TWICE A DAY       meclizine (ANTIVERT) 25 MG tablet Take 1 tablet (25 mg) by mouth 3 times daily as needed for dizziness (Patient not taking: Reported on 12/18/2020) 20 tablet 0     vitamin C (ASCORBIC ACID) 500 MG tablet Take 500 mg by mouth          Family History:  Family History   Problem Relation Age of Onset     Hypertension Mother      Breast Cancer Sister 42     Breast Cancer Maternal Aunt 50     Esophageal Cancer Maternal Uncle 70       Social History:  Social History     Socioeconomic History     Marital status:      Spouse name: Jamison     Number of children: 2     Years of education: Not on file     Highest education level: Not on file   Occupational History     Employer: [employer]   Social Needs     Financial resource strain: Not on file     Food insecurity     Worry: Not on file     Inability: Not on file     Transportation needs     Medical: Not on file     Non-medical: Not on file   Tobacco Use     Smoking status: Never Smoker     Smokeless tobacco: Never Used   Substance and Sexual Activity     Alcohol use: Yes     Comment: occas     Drug use: No     Sexual activity: Yes     Partners: Male     Birth control/protection: Male Surgical     Comment:  with vasectomy   Lifestyle     Physical activity     Days per week: Not on file     Minutes per session: Not on file     Stress: Not on file   Relationships     Social connections     Talks on phone: Not on file     Gets together: Not on file     Attends Evangelical service: Not on file     Active member of club or organization: Not on file     Attends meetings of clubs or organizations: Not on file      Relationship status: Not on file     Intimate partner violence     Fear of current or ex partner: Not on file     Emotionally abused: Not on file     Physically abused: Not on file     Forced sexual activity: Not on file   Other Topics Concern     Parent/sibling w/ CABG, MI or angioplasty before 65F 55M? Not Asked   Social History Narrative     Not on file       Physical Exam:  There were no vitals taken for this visit.  Physical exam deferred due to telephone visit.     Laboratory/Imaging Studies  No results found for any visits on 07/06/21.    ASSESSMENT  We discussed her pathology as benign and not needing followup at this time.  We discussed that I had consulted with Dr. Martínez, who is in agreement that the papilloma is too small to warrant surgery but that we should continue close surveillance.     We also revisited her family history; there are no changes to report.  She continues self exams and denies any issues (lumps, nipple discharge, skin puckering, nipple inversion). We will continue surveillance, per below.  Individualized Surveillance Plan for women  With 20% or greater lifetime risk of breast cancer   Per NCCN Breast Cancer Screening and Diagnosis Guidelines Version 1.2021   Recommended screening Test or procedure Last done Next Scheduled    Clinical encounter Clinical exam every 6-12 months.   Refer to genetic counseling if not already done.  Consider risk reduction strategies.   7/6/2021- exam deferred   January 2022   However, some family histories with breast cancers at a very young age, may warrant screening starting earlier.    *May begin at age 40 if breast cancers in the family occur at later ages.    Annual mammogram beginning 10 years younger than the earliest breast cancer in the family but not prior to age 30.    Recommend annual breast MRI to begin 10 years younger than the earliest breast cancer in the family but not prior to age 25.    Breast MRIs are preferably done on day 7-15 of  the menstrual cycle in premenopausal women. 7/2/2021- Breast MRI and diagnostic tomosyntheiss mammogram of right breast, both BiRads1 Screening mammogram January 2022 after exam (1/8 or later)   Breast screening for patients at high risk due to thoracic radiation between the ages of 10-30   Annual clinical exam beginning 8 years after radiation therapy.    Annual screening mammogram beginning at age 30 or 8 years after radiation therapy    Annual breast MRI, beginning at age 25 or 8 years after radiation therapy.       NA     NA   Women who have a lifetime risk of >20% based on history of LCIS or ADH/ALH Annual screening mammogram beginning at age of LCIS or ADH/ALH but not prior to age 30.    Consider annual MRI to begin at age of diagnosis of LCIS or ADH/ALH but not prior to age 25.    Consider risk reducing strategies.     NA     NA    Recommend risk reducing strategies for women with 1.7% 5 year risk of breast cancer. Tried tamoxifen; stopped due to side effects      I spent a total of 22 minutes on the day of the visit. Please see the note for further information on patient assessment and treatment.    KUSH Nix-CNS, OCN, AGN-BC  Clinical Nurse Specialist  Cancer Risk Management Program  [facility] [address]  [place] [address]    phone:  [phone]  Pager: [phone]  fax: [phone]    CC:.  MD Naomi Lockett MD

## 2021-07-08 ENCOUNTER — TELEPHONE (OUTPATIENT)
Dept: ONCOLOGY | Facility: CLINIC | Age: 46
End: 2021-07-08

## 2021-07-08 NOTE — TELEPHONE ENCOUNTER
7/8/21 - [facility] to call [phone] opt5, opt2 to schedule the following for Imani Gallagher:    1. In person visit with Imani in January 2022 AFTER 1/8/22 (Tuesdays or 14th or 28th)  2. Mammogram AFTER visit with Imani (can be right after in person appt)    -[place]

## 2021-10-02 ENCOUNTER — HEALTH MAINTENANCE LETTER (OUTPATIENT)
Age: 46
End: 2021-10-02

## 2022-01-18 ENCOUNTER — ONCOLOGY VISIT (OUTPATIENT)
Dept: ONCOLOGY | Facility: CLINIC | Age: 47
End: 2022-01-18
Attending: CLINICAL NURSE SPECIALIST
Payer: COMMERCIAL

## 2022-01-18 ENCOUNTER — ANCILLARY PROCEDURE (OUTPATIENT)
Dept: MAMMOGRAPHY | Facility: CLINIC | Age: 47
End: 2022-01-18
Attending: CLINICAL NURSE SPECIALIST
Payer: COMMERCIAL

## 2022-01-18 VITALS
SYSTOLIC BLOOD PRESSURE: 126 MMHG | HEIGHT: 63 IN | HEART RATE: 71 BPM | BODY MASS INDEX: 24.45 KG/M2 | OXYGEN SATURATION: 100 % | DIASTOLIC BLOOD PRESSURE: 86 MMHG | WEIGHT: 138 LBS | TEMPERATURE: 97.5 F | RESPIRATION RATE: 14 BRPM

## 2022-01-18 DIAGNOSIS — D24.1 PAPILLOMA OF RIGHT BREAST: ICD-10-CM

## 2022-01-18 DIAGNOSIS — R92.30 DENSE BREAST: ICD-10-CM

## 2022-01-18 DIAGNOSIS — Z12.39 BREAST CANCER SCREENING, HIGH RISK PATIENT: Primary | ICD-10-CM

## 2022-01-18 DIAGNOSIS — Z12.39 BREAST CANCER SCREENING, HIGH RISK PATIENT: ICD-10-CM

## 2022-01-18 PROBLEM — K64.9 HEMORRHOID: Status: ACTIVE | Noted: 2021-08-04

## 2022-01-18 PROBLEM — E78.5 HYPERLIPIDEMIA: Status: ACTIVE | Noted: 2021-08-05

## 2022-01-18 PROBLEM — F41.1 GAD (GENERALIZED ANXIETY DISORDER): Status: ACTIVE | Noted: 2021-08-04

## 2022-01-18 PROBLEM — G40.909 SEIZURE DISORDER (H): Status: ACTIVE | Noted: 2021-08-04

## 2022-01-18 PROCEDURE — 99214 OFFICE O/P EST MOD 30 MIN: CPT | Performed by: CLINICAL NURSE SPECIALIST

## 2022-01-18 PROCEDURE — G0463 HOSPITAL OUTPT CLINIC VISIT: HCPCS

## 2022-01-18 PROCEDURE — 77063 BREAST TOMOSYNTHESIS BI: CPT | Mod: GC | Performed by: RADIOLOGY

## 2022-01-18 PROCEDURE — 77067 SCR MAMMO BI INCL CAD: CPT | Mod: GC | Performed by: RADIOLOGY

## 2022-01-18 ASSESSMENT — PAIN SCALES - GENERAL: PAINLEVEL: NO PAIN (0)

## 2022-01-18 ASSESSMENT — MIFFLIN-ST. JEOR: SCORE: 1235.09

## 2022-01-18 NOTE — PATIENT INSTRUCTIONS
Individualized Surveillance Plan for women  With 20% or greater lifetime risk of breast cancer   Per NCCN Breast Cancer Screening and Diagnosis Guidelines Version 1.2020   Recommended screening Test or procedure Last done Next Scheduled    Clinical encounter Clinical exam every 6-12 months.   Refer to genetic counseling if not already done.  Consider risk reduction strategies.   1/18/2022 Jan. 2023   However, some family histories with breast cancers at a very young age, may warrant screening starting earlier.    *May begin at age 40 if breast cancers in the family occur at later ages.    Annual mammogram beginning 10 years younger than the earliest breast cancer in the family but not prior to age 30.    Recommend annual breast MRI to begin 10 years younger than the earliest breast cancer in the family but not prior to age 25.    Breast MRIs are preferably done on day 7-15 of the menstrual cycle in premenopausal women. 1/11/2021- Diagnostic tomosynthesis mammogram, BiRads4 for group of amorphous calcifications in the slightly upper slightly outer right breast at mid depth posterior to the site of prior excision, for which additional evaluation with stereotactic guided biopsy is indicated.    1/19/2021- Biopsy:  2 mm benign papilloma.     7/2/2021- Breast MRI and diagnostic tomosyntheiss mammogram of right breast, both BiRads1 Mammogram after visit today    Breast MRI in July    Next exam: January 2023 followed by a mammogram   Breast screening for patients at high risk due to thoracic radiation between the ages of 10-30   Annual clinical exam beginning 8 years after radiation therapy.    Annual screening mammogram beginning at age 30 or 8 years after radiation therapy    Annual breast MRI, beginning at age 25 or 8 years after radiation therapy.       NA     NA   Women who have a lifetime risk of >20% based on history of LCIS or ADH/ALH Annual screening mammogram beginning at age of LCIS or ADH/ALH but not prior to  age 30.    Consider annual MRI to begin at age of diagnosis of LCIS or ADH/ALH but not prior to age 25.    Consider risk reducing strategies.     NA     NA    Recommend risk reducing strategies for women with 1.7% 5 year risk of breast cancer.

## 2022-01-18 NOTE — LETTER
1/18/2022         RE: Brittney Morris  [address]  [facility] [address]        Dear Colleague,    Thank you for referring your patient, Brittney Morris, to the [facility]. Please see a copy of my visit note below.    Oncology Risk Management Consultation:  Date on this visit: 1/18/2022    Brittney Morris requires high risk screening and surveillance to reduce  her risk of cancer secondary to a family history of breast cancer in her sister at age 42, and a maternal aunt in her 50s,     She has a 28.9%  lifetime risk for breast cancer by the Bulmaro model and a 42.9% lifetime risk by the JACOB model. Her 5 year risk is 4.2% by the JACOB model.     Primary Physician:  Johana Agrawal MD     History Of Present Illness:  Ms. Morris is a very pleasant , healthy 46 year old female who presents with a family history of breast cancer.      Pertinent history:  Menarche at 12.  First child at 38.  12/3/2012 - Partial molar pregnancy.  Premenopausal.  Ovaries, fallopian tubes and uterus intact.  LMP:  unknown; issues with chronic spotting, currently using IUD to control bleeding  Breast Density: heterogeneously dense.  Hx of OCP use x3-4 years.  Vasectomy for birth control.  No hx of HRT.  6/7/2018: Right breast core biopsies x2:                 Site A: 1230 position, 2 cm from nipple measuring 0.6 cm biopsied.  An X shaped clip was placed                Site B; 0.6 cm ill-defined hypoechoic lesion at 12:00, 4 cm from the nipple was biopsied -Q shaped clip was placed   Percutaneous core needle biopsy, right:    A.  Benign intraductal papilloma  B: Fragments of a radial scar (complex sclerosing lesion (with associated calcifications.  There is no evidence of invasive malignancy on this biopsy specimen.  Imaging reviewed with Dr. Santiago and Dr. Parisi who agreed the pathologic findings are concordant with radiologic findings.     8/22/2018 - Excisional biopsy, right  breast: no evidence of residual radial scar, fibrocystic changes with usual ductal hyperplasia and microcalcifications. Negative for in situ or invasive carcinoma.  No hx of atypia or malignancy.   2021- Right stereotactic core biopsy, Pathology results:  RIGHT BREAST, 9:00, MID DEPTH, STEREOTACTIC CORE BIOPSY:   - Fibrocystic change (including apocrine metaplasia and microcysts),   columnar cell change, adenomyoepithelial   adenosis (focal), and usual duct hyperplasia (UDH).   - Benign intraductal papilloma (2 mm).   - Calcifications associated with benign acini.   - Negative for atypia or malignancy.   History of low dose tamoxifen from April-2019; stopped due to dizziness, side effects.     Genetic testin2019 NEGATIVE for mutations in the MARIIA, BRCA1, BRCA2, BRIP1, CDH1, CHEK2, EPCAM, MLH1, MSH2, MSH6, NBN, NF1, PALB2, PMS2, PTEN, RAD51C, RAD51D, STK11, and TP53 genes using a Custom Next-Cancer (19 genes, a combination of Gyn Plus and BRCA plus + NBN, STK11, and NF1) panel  from [employer].  No mutations were found in any of the 19 genes analyzed. This test involved sequencing and deletion/duplication analysis of all genes with the exception of EPCAM (deletions/duplications only). This testing was done because of her family history of her sister's breast cancer diagnosed at age 42, and her maternal aunt's breast cancer diagnosed in her 50's.     Pertinent screening history:  2014 right breast ultrasound for palpable right axillary lump. Patient is currently breast-feeding.  BI-RADS Category 1.     2015: Baseline screening mammogram, BI-RADS 1.   10/11/2016: Screening Tomosynthesis mammogram, BI-RADS 1  .10/12/2017: Screening Tomosynthesis mammogram, BI-RADS 1.   2018 -- Digital mammogram diagnostic unilateral tomosynthesis and Ultrasound of the right medial breast:  An oval hypoechoic solid-appearing mass is present at the 12 o'clock position 4 cm from the nipple measuring  0.6 cm.  A round hypoechoic mass with through transmission is present at the 1230 position, 2 cm from the nipple measuring 0.6 cm which may represent a complicated cyst.  Scattered tiny benign cysts are noted in the medial right breast. BI-RADS category: 4 suspicious -ultrasound-guided biopsy recommended.  12/5/2018- Screening tomosynthesis mammogram, BiRads2  5/6/2019- Breast MRI, BiRads2  12/12/2019- Screening tomosynthesis mammogram, BiRads1  1/7/2021- Screening tomosynthesis mammogram, BiRads0 for Post biopsy changes right breast. Possible developing  calcifications mid depth right breast just lateral to central core mid  depth. (CC josafat 30/72). No significant change left breast.  1/11/2021- Diagnostic tomosynthesis mammogram, BiRads4 for group of amorphous calcifications in the slightly upper slightly outer right breast at mid depth posterior to the site of prior excision, for which additional evaluation with stereotactic guided biopsy is indicated.  1/19/2021- Biopsy:  2 mm benign papilloma.   7/2/2021- Breast MRI and diagnostic tomosynthesis mammogram of right breast, both BiRads1     At this visit, she denies new fatigue, breast asymmetry, lumps, masses, thickening, pain, nipple discharge and skin changes.      Past Medical/Surgical History:  Past Medical History:   Diagnosis Date     Anxiety      Missed ab      Seizures (H) 1985    Seizure as a child. Took meds for 2 years.     UTI (urinary tract infection)      Past Surgical History:   Procedure Laterality Date     BIOPSY BREAST SEED LOCALIZATION Right 8/22/2018    Procedure: BIOPSY BREAST SEED LOCALIZATION;  RIGHT SEED LOCALIZED BREAST BIOPSY ;  Surgeon: Naomi Lewis MD;  Location: [facility]     DILATION AND CURETTAGE SUCTION  12/3/2012    Procedure: DILATION AND CURETTAGE SUCTION;  SUCTION DILATION AND CURETTAGE;  Surgeon: Nargis Gordon MD;  Location: [facility]     wisdom teeth[         Allergies:  Allergies as of 01/18/2022     (No  "Known Allergies)       Current Medications:  Current Outpatient Medications   Medication Sig Dispense Refill     cholecalciferol 25 MCG (1000 UT) TABS Take 1,000 Units by mouth       levETIRAcetam (KEPPRA) 500 MG tablet TAKE 1 TABLET BY MOUTH TWICE A DAY       vitamin C (ASCORBIC ACID) 500 MG tablet Take 500 mg by mouth       meclizine (ANTIVERT) 25 MG tablet Take 1 tablet (25 mg) by mouth 3 times daily as needed for dizziness (Patient not taking: Reported on 12/18/2020) 20 tablet 0        Family History:  Family History   Problem Relation Age of Onset     Hypertension Mother      Breast Cancer Sister 42     Breast Cancer Maternal Aunt 50     Esophageal Cancer Maternal Uncle 70       Social History:  Social History     Socioeconomic History     Marital status:      Spouse name: Jamison     Number of children: 2     Years of education: Not on file     Highest education level: Not on file   Occupational History     Employer: [employer]   Tobacco Use     Smoking status: Never Smoker     Smokeless tobacco: Never Used   Substance and Sexual Activity     Alcohol use: Yes     Comment: occas     Drug use: No     Sexual activity: Yes     Partners: Male     Birth control/protection: Male Surgical     Comment:  with vasectomy   Other Topics Concern     Parent/sibling w/ CABG, MI or angioplasty before 65F 55M? Not Asked   Social History Narrative     Not on file     Social Determinants of Health     Financial Resource Strain: Not on file   Food Insecurity: Not on file   Transportation Needs: Not on file   Physical Activity: Not on file   Stress: Not on file   Social Connections: Not on file   Intimate Partner Violence: Not on file   Housing Stability: Not on file       Physical Exam:  /86   Pulse 71   Temp 97.5  F (36.4  C) (Oral)   Resp 14   Ht 1.6 m (5' 3\")   Wt 62.6 kg (138 lb)   LMP 08/01/2018 (Approximate)   SpO2 100%   BMI 24.45 kg/m      GENERAL APPEARANCE: healthy, alert and no distress     " NECK: no adenopathy, no asymmetry or masses     RESP: lungs clear to auscultation - no rales, rhonchi or wheezes    BREAST: A multipositional, bilateral breast exam was performed.  Lsl>R. Right breast: no palpable dominant masses, no nipple discharge, no skin changes. Well healed scars above and in outer quadrant of the breast tissue. Fibrocystic changes throughout especially in upper outer quadrant.  No pea size lumps; tissue has long areas of ropiness throughout.  Right axilla: no palpable adenopathy. Left breast: no palpable dominant masses, no nipple discharge, no skin changes. Left axilla: no palpable adenopathy. Dense tissue.    LYMPHATICS: No cervical, supraclavicular or axillary  lymphadenopathy     CARDIOVASCULAR: regular rate and rhythm, normal S1 S2, no S3 or S4 and no murmur.        SKIN: Scattered raised tan moles on abdomen and throughout breast tissue bilaterally.  No suspicious lesions or rashes    Laboratory/Imaging Studies  No results found for any visits on 01/18/22.    ASSESSMENT  We discussed her last screening tests and reviewed results.  We reviewed her family history; there are no new cancers.  She has been doing well working at home and although she has gained a few pounds, is walking around [facility] 4-5 times per week, doing some strength training with light weights and planks and is trying to watch her diet.  She is trying to eat several servings of fruits and vegetables per day and feels that she has more time to focus on this as she doesn't have her commute to work.  She eats oatmeal for breakfast is trying to focus on a heart healthy diet.    Her exam today shows continued challenges with fibrocystic breast tissue, but no significant changes or concerns.  She will proceed with the following.   Individualized Surveillance Plan for women  With 20% or greater lifetime risk of breast cancer   Per NCCN Breast Cancer Screening and Diagnosis Guidelines Version 1.2020   Recommended  screening Test or procedure Last done Next Scheduled    Clinical encounter Clinical exam every 6-12 months.   Refer to genetic counseling if not already done.  Consider risk reduction strategies.   1/18/2022 Jan. 2023   However, some family histories with breast cancers at a very young age, may warrant screening starting earlier.    *May begin at age 40 if breast cancers in the family occur at later ages.    Annual mammogram beginning 10 years younger than the earliest breast cancer in the family but not prior to age 30.    Recommend annual breast MRI to begin 10 years younger than the earliest breast cancer in the family but not prior to age 25.    Breast MRIs are preferably done on day 7-15 of the menstrual cycle in premenopausal women. 1/11/2021- Diagnostic tomosynthesis mammogram, BiRads4 for group of amorphous calcifications in the slightly upper slightly outer right breast at mid depth posterior to the site of prior excision, for which additional evaluation with stereotactic guided biopsy is indicated.    1/19/2021- Biopsy:  2 mm benign papilloma.     7/2/2021- Breast MRI and diagnostic tomosynthesis mammogram of right breast, both BiRads1 Mammogram after visit today    Breast MRI in July    Next exam: January 2023 followed by a mammogram   Breast screening for patients at high risk due to thoracic radiation between the ages of 10-30   Annual clinical exam beginning 8 years after radiation therapy.    Annual screening mammogram beginning at age 30 or 8 years after radiation therapy    Annual breast MRI, beginning at age 25 or 8 years after radiation therapy.       NA     NA   Women who have a lifetime risk of >20% based on history of LCIS or ADH/ALH Annual screening mammogram beginning at age of LCIS or ADH/ALH but not prior to age 30.    Consider annual MRI to begin at age of diagnosis of LCIS or ADH/ALH but not prior to age 25.    Consider risk reducing strategies.     NA     NA    Recommend risk reducing  strategies for women with 1.7% 5 year risk of breast cancer.       I spent a total of 33 minutes on the day of the visit. Please see the note for further information on patient assessment and treatment.    KUSH Nix-CNS, OCN, AGN-BC  Clinical Nurse Specialist  Cancer Risk Management Program  [facility]  phone:  [phone]  fax: [phone]    CC; Johana Agrawal MD

## 2022-01-18 NOTE — PROGRESS NOTES
Oncology Risk Management Consultation:  Date on this visit: 1/18/2022    Brittney Morris requires high risk screening and surveillance to reduce  her risk of cancer secondary to a family history of breast cancer in her sister at age 42, and a maternal aunt in her 50s,     She has a 28.9%  lifetime risk for breast cancer by the Bulmaro model and a 42.9% lifetime risk by the JACOB model. Her 5 year risk is 4.2% by the JACOB model.     Primary Physician:  Johana Agrawal MD     History Of Present Illness:  Ms. oMrris is a very pleasant , healthy 46 year old female who presents with a family history of breast cancer.      Pertinent history:  Menarche at 12.  First child at 38.  12/3/2012 - Partial molar pregnancy.  Premenopausal.  Ovaries, fallopian tubes and uterus intact.  LMP:  unknown; issues with chronic spotting, currently using IUD to control bleeding  Breast Density: heterogeneously dense.  Hx of OCP use x3-4 years.  Vasectomy for birth control.  No hx of HRT.  6/7/2018: Right breast core biopsies x2:                 Site A: 1230 position, 2 cm from nipple measuring 0.6 cm biopsied.  An X shaped clip was placed                Site B; 0.6 cm ill-defined hypoechoic lesion at 12:00, 4 cm from the nipple was biopsied -Q shaped clip was placed   Percutaneous core needle biopsy, right:    A.  Benign intraductal papilloma  B: Fragments of a radial scar (complex sclerosing lesion (with associated calcifications.  There is no evidence of invasive malignancy on this biopsy specimen.  Imaging reviewed with Dr. Santiago and Dr. Parisi who agreed the pathologic findings are concordant with radiologic findings.     8/22/2018 - Excisional biopsy, right breast: no evidence of residual radial scar, fibrocystic changes with usual ductal hyperplasia and microcalcifications. Negative for in situ or invasive carcinoma.  No hx of atypia or malignancy.   1/ 2021- Right stereotactic core biopsy, Pathology results:  RIGHT BREAST,  9:00, MID DEPTH, STEREOTACTIC CORE BIOPSY:   - Fibrocystic change (including apocrine metaplasia and microcysts),   columnar cell change, adenomyoepithelial   adenosis (focal), and usual duct hyperplasia (UDH).   - Benign intraductal papilloma (2 mm).   - Calcifications associated with benign acini.   - Negative for atypia or malignancy.   History of low dose tamoxifen from April-2019; stopped due to dizziness, side effects.     Genetic testin2019 NEGATIVE for mutations in the MARIIA, BRCA1, BRCA2, BRIP1, CDH1, CHEK2, EPCAM, MLH1, MSH2, MSH6, NBN, NF1, PALB2, PMS2, PTEN, RAD51C, RAD51D, STK11, and TP53 genes using a Custom Next-Cancer (19 genes, a combination of Gyn Plus and BRCA plus + NBN, STK11, and NF1) panel  from [employer].  No mutations were found in any of the 19 genes analyzed. This test involved sequencing and deletion/duplication analysis of all genes with the exception of EPCAM (deletions/duplications only). This testing was done because of her family history of her sister's breast cancer diagnosed at age 42, and her maternal aunt's breast cancer diagnosed in her 50's.     Pertinent screening history:  2014 right breast ultrasound for palpable right axillary lump. Patient is currently breast-feeding.  BI-RADS Category 1.     2015: Baseline screening mammogram, BI-RADS 1.   10/11/2016: Screening Tomosynthesis mammogram, BI-RADS 1  .10/12/2017: Screening Tomosynthesis mammogram, BI-RADS 1.   2018 -- Digital mammogram diagnostic unilateral tomosynthesis and Ultrasound of the right medial breast:  An oval hypoechoic solid-appearing mass is present at the 12 o'clock position 4 cm from the nipple measuring 0.6 cm.  A round hypoechoic mass with through transmission is present at the 1230 position, 2 cm from the nipple measuring 0.6 cm which may represent a complicated cyst.  Scattered tiny benign cysts are noted in the medial right breast. BI-RADS category: 4  suspicious -ultrasound-guided biopsy recommended.  12/5/2018- Screening tomosynthesis mammogram, BiRads2  5/6/2019- Breast MRI, BiRads2  12/12/2019- Screening tomosynthesis mammogram, BiRads1  1/7/2021- Screening tomosynthesis mammogram, BiRads0 for Post biopsy changes right breast. Possible developing  calcifications mid depth right breast just lateral to central core mid  depth. (CC josafat 30/72). No significant change left breast.  1/11/2021- Diagnostic tomosynthesis mammogram, BiRads4 for group of amorphous calcifications in the slightly upper slightly outer right breast at mid depth posterior to the site of prior excision, for which additional evaluation with stereotactic guided biopsy is indicated.  1/19/2021- Biopsy:  2 mm benign papilloma.   7/2/2021- Breast MRI and diagnostic tomosynthesis mammogram of right breast, both BiRads1     At this visit, she denies new fatigue, breast asymmetry, lumps, masses, thickening, pain, nipple discharge and skin changes.      Past Medical/Surgical History:  Past Medical History:   Diagnosis Date     Anxiety      Missed ab      Seizures (H) 1985    Seizure as a child. Took meds for 2 years.     UTI (urinary tract infection)      Past Surgical History:   Procedure Laterality Date     BIOPSY BREAST SEED LOCALIZATION Right 8/22/2018    Procedure: BIOPSY BREAST SEED LOCALIZATION;  RIGHT SEED LOCALIZED BREAST BIOPSY ;  Surgeon: Naomi Lewis MD;  Location: [facility]     DILATION AND CURETTAGE SUCTION  12/3/2012    Procedure: DILATION AND CURETTAGE SUCTION;  SUCTION DILATION AND CURETTAGE;  Surgeon: Nargis Gordon MD;  Location: [facility]     wisdom teeth[         Allergies:  Allergies as of 01/18/2022     (No Known Allergies)       Current Medications:  Current Outpatient Medications   Medication Sig Dispense Refill     cholecalciferol 25 MCG (1000 UT) TABS Take 1,000 Units by mouth       levETIRAcetam (KEPPRA) 500 MG tablet TAKE 1 TABLET BY MOUTH TWICE A DAY        "vitamin C (ASCORBIC ACID) 500 MG tablet Take 500 mg by mouth       meclizine (ANTIVERT) 25 MG tablet Take 1 tablet (25 mg) by mouth 3 times daily as needed for dizziness (Patient not taking: Reported on 12/18/2020) 20 tablet 0        Family History:  Family History   Problem Relation Age of Onset     Hypertension Mother      Breast Cancer Sister 42     Breast Cancer Maternal Aunt 50     Esophageal Cancer Maternal Uncle 70       Social History:  Social History     Socioeconomic History     Marital status:      Spouse name: Jamison     Number of children: 2     Years of education: Not on file     Highest education level: Not on file   Occupational History     Employer: [employer]   Tobacco Use     Smoking status: Never Smoker     Smokeless tobacco: Never Used   Substance and Sexual Activity     Alcohol use: Yes     Comment: occas     Drug use: No     Sexual activity: Yes     Partners: Male     Birth control/protection: Male Surgical     Comment:  with vasectomy   Other Topics Concern     Parent/sibling w/ CABG, MI or angioplasty before 65F 55M? Not Asked   Social History Narrative     Not on file     Social Determinants of Health     Financial Resource Strain: Not on file   Food Insecurity: Not on file   Transportation Needs: Not on file   Physical Activity: Not on file   Stress: Not on file   Social Connections: Not on file   Intimate Partner Violence: Not on file   Housing Stability: Not on file       Physical Exam:  /86   Pulse 71   Temp 97.5  F (36.4  C) (Oral)   Resp 14   Ht 1.6 m (5' 3\")   Wt 62.6 kg (138 lb)   LMP 08/01/2018 (Approximate)   SpO2 100%   BMI 24.45 kg/m      GENERAL APPEARANCE: healthy, alert and no distress     NECK: no adenopathy, no asymmetry or masses     RESP: lungs clear to auscultation - no rales, rhonchi or wheezes    BREAST: A multipositional, bilateral breast exam was performed.  Lsl>R. Right breast: no palpable dominant masses, no nipple discharge, no skin " changes. Well healed scars above and in outer quadrant of the breast tissue. Fibrocystic changes throughout especially in upper outer quadrant.  No pea size lumps; tissue has long areas of ropiness throughout.  Right axilla: no palpable adenopathy. Left breast: no palpable dominant masses, no nipple discharge, no skin changes. Left axilla: no palpable adenopathy. Dense tissue.    LYMPHATICS: No cervical, supraclavicular or axillary  lymphadenopathy     CARDIOVASCULAR: regular rate and rhythm, normal S1 S2, no S3 or S4 and no murmur.        SKIN: Scattered raised tan moles on abdomen and throughout breast tissue bilaterally.  No suspicious lesions or rashes    Laboratory/Imaging Studies  No results found for any visits on 01/18/22.    ASSESSMENT  We discussed her last screening tests and reviewed results.  We reviewed her family history; there are no new cancers.  She has been doing well working at home and although she has gained a few pounds, is walking around [facility] 4-5 times per week, doing some strength training with light weights and planks and is trying to watch her diet.  She is trying to eat several servings of fruits and vegetables per day and feels that she has more time to focus on this as she doesn't have her commute to work.  She eats oatmeal for breakfast is trying to focus on a heart healthy diet.    Her exam today shows continued challenges with fibrocystic breast tissue, but no significant changes or concerns.  She will proceed with the following.   Individualized Surveillance Plan for women  With 20% or greater lifetime risk of breast cancer   Per NCCN Breast Cancer Screening and Diagnosis Guidelines Version 1.2020   Recommended screening Test or procedure Last done Next Scheduled    Clinical encounter Clinical exam every 6-12 months.   Refer to genetic counseling if not already done.  Consider risk reduction strategies.   1/18/2022 Jan. 2023   However, some family histories with breast  cancers at a very young age, may warrant screening starting earlier.    *May begin at age 40 if breast cancers in the family occur at later ages.    Annual mammogram beginning 10 years younger than the earliest breast cancer in the family but not prior to age 30.    Recommend annual breast MRI to begin 10 years younger than the earliest breast cancer in the family but not prior to age 25.    Breast MRIs are preferably done on day 7-15 of the menstrual cycle in premenopausal women. 1/11/2021- Diagnostic tomosynthesis mammogram, BiRads4 for group of amorphous calcifications in the slightly upper slightly outer right breast at mid depth posterior to the site of prior excision, for which additional evaluation with stereotactic guided biopsy is indicated.    1/19/2021- Biopsy:  2 mm benign papilloma.     7/2/2021- Breast MRI and diagnostic tomosynthesis mammogram of right breast, both BiRads1 Mammogram after visit today    Breast MRI in July    Next exam: January 2023 followed by a mammogram   Breast screening for patients at high risk due to thoracic radiation between the ages of 10-30   Annual clinical exam beginning 8 years after radiation therapy.    Annual screening mammogram beginning at age 30 or 8 years after radiation therapy    Annual breast MRI, beginning at age 25 or 8 years after radiation therapy.       NA     NA   Women who have a lifetime risk of >20% based on history of LCIS or ADH/ALH Annual screening mammogram beginning at age of LCIS or ADH/ALH but not prior to age 30.    Consider annual MRI to begin at age of diagnosis of LCIS or ADH/ALH but not prior to age 25.    Consider risk reducing strategies.     NA     NA    Recommend risk reducing strategies for women with 1.7% 5 year risk of breast cancer.       I spent a total of 33 minutes on the day of the visit. Please see the note for further information on patient assessment and treatment.    Imani Gallagher, APRN-CNS, OCN, AGN-BC  Clinical  Nurse Specialist  Cancer Risk Management Program  [facility]  phone:  [phone]  fax: [phone]    CC; Johana Agrawal MD

## 2022-01-18 NOTE — NURSING NOTE
"Oncology Rooming Note    January 18, 2022 11:03 AM   Brittney Morris is a 46 year old female who presents for:    Chief Complaint   Patient presents with     Oncology Clinic Visit     Return;      Initial Vitals: /86   Pulse 71   Temp 97.5  F (36.4  C) (Oral)   Resp 14   Ht 1.6 m (5' 3\")   Wt 62.6 kg (138 lb)   LMP 08/01/2018 (Approximate)   SpO2 100%   BMI 24.45 kg/m   Estimated body mass index is 24.45 kg/m  as calculated from the following:    Height as of this encounter: 1.6 m (5' 3\").    Weight as of this encounter: 62.6 kg (138 lb). Body surface area is 1.67 meters squared.  No Pain (0) Comment: Data Unavailable   Patient's last menstrual period was 08/01/2018 (approximate).  Allergies reviewed: Yes  Medications reviewed: Yes    Medications: Medication refills not needed today.  Pharmacy name entered into [employer]:    [id number] - [place], [place] - [address] AT  [address] [facility] [place] [place] - [address] AT PORTAL TO REGISTERED [facility] SITES    Clinical concerns: Patient states there are no new concerns to discuss with provider.  Imani was not notified.           Mei Arriaza CMA              "

## 2022-05-14 ENCOUNTER — HEALTH MAINTENANCE LETTER (OUTPATIENT)
Age: 47
End: 2022-05-14

## 2022-07-08 ENCOUNTER — ANCILLARY PROCEDURE (OUTPATIENT)
Dept: MRI IMAGING | Facility: CLINIC | Age: 47
End: 2022-07-08
Attending: CLINICAL NURSE SPECIALIST
Payer: COMMERCIAL

## 2022-07-08 DIAGNOSIS — Z12.39 BREAST CANCER SCREENING, HIGH RISK PATIENT: ICD-10-CM

## 2022-07-08 DIAGNOSIS — R92.30 DENSE BREAST: ICD-10-CM

## 2022-07-08 DIAGNOSIS — D24.1 PAPILLOMA OF RIGHT BREAST: ICD-10-CM

## 2022-07-08 PROCEDURE — A9585 GADOBUTROL INJECTION: HCPCS | Performed by: RADIOLOGY

## 2022-07-08 PROCEDURE — 77049 MRI BREAST C-+ W/CAD BI: CPT | Mod: GC | Performed by: RADIOLOGY

## 2022-07-08 RX ORDER — GADOBUTROL 604.72 MG/ML
7.5 INJECTION INTRAVENOUS ONCE
Status: COMPLETED | OUTPATIENT
Start: 2022-07-08 | End: 2022-07-08

## 2022-07-08 RX ADMIN — GADOBUTROL 6 ML: 604.72 INJECTION INTRAVENOUS at 08:41

## 2022-07-08 NOTE — DISCHARGE INSTRUCTIONS
MRI Contrast Discharge Instructions    The IV contrast you received today will pass out of your body in your  urine. This will happen in the next 24 hours. You will not feel this process.  Your urine will not change color.    Drink at least 4 extra glasses of water or juice today (unless your doctor  has restricted your fluids). This reduces the stress on your kidneys.  You may take your regular medicines.    If you are on dialysis: It is best to have dialysis today.    If you have a reaction: Most reactions happen right away. If you have  any new symptoms after leaving the hospital (such as hives or swelling),  call your hospital at the correct number below. Or call your family doctor.  If you have breathing distress or wheezing, call 911.    Special instructions: ***    I have read and understand the above information.    Signature:______________________________________ Date:___________    Staff:__________________________________________ Date:___________     Time:__________    [facility] Departments:    ___[place]: [phone]  ___[facility]: [phone]  ___[place]: [phone] ___[facility]: [phone]  ___[facility]: [phone]  ___[facility]: [phone]  ___[phone]  ___[facility]: [phone]  ___[phone]

## 2022-09-03 ENCOUNTER — HEALTH MAINTENANCE LETTER (OUTPATIENT)
Age: 47
End: 2022-09-03

## 2022-11-11 ENCOUNTER — LAB REQUISITION (OUTPATIENT)
Dept: LAB | Facility: CLINIC | Age: 47
End: 2022-11-11

## 2022-11-11 DIAGNOSIS — Z13.1 ENCOUNTER FOR SCREENING FOR DIABETES MELLITUS: ICD-10-CM

## 2022-11-11 DIAGNOSIS — Z13.220 ENCOUNTER FOR SCREENING FOR LIPOID DISORDERS: ICD-10-CM

## 2022-11-11 LAB
CHOLEST SERPL-MCNC: 223 MG/DL
FASTING STATUS PATIENT QL REPORTED: YES
GLUCOSE SERPL-MCNC: 98 MG/DL (ref 70–99)
HDLC SERPL-MCNC: 65 MG/DL
LDLC SERPL CALC-MCNC: 142 MG/DL
NONHDLC SERPL-MCNC: 158 MG/DL
TRIGL SERPL-MCNC: 81 MG/DL

## 2022-11-11 PROCEDURE — 82947 ASSAY GLUCOSE BLOOD QUANT: CPT | Performed by: OBSTETRICS & GYNECOLOGY

## 2022-11-11 PROCEDURE — 80061 LIPID PANEL: CPT | Performed by: OBSTETRICS & GYNECOLOGY

## 2023-01-23 PROBLEM — H16.142: Status: ACTIVE | Noted: 2022-05-10

## 2023-01-23 PROBLEM — H52.203 MYOPIA OF BOTH EYES WITH ASTIGMATISM AND PRESBYOPIA: Status: ACTIVE | Noted: 2022-05-10

## 2023-01-23 PROBLEM — H52.13 MYOPIA OF BOTH EYES WITH ASTIGMATISM AND PRESBYOPIA: Status: ACTIVE | Noted: 2022-05-10

## 2023-01-23 PROBLEM — K21.9 GASTROESOPHAGEAL REFLUX DISEASE WITHOUT ESOPHAGITIS: Status: ACTIVE | Noted: 2017-07-14

## 2023-01-23 PROBLEM — H52.4 MYOPIA OF BOTH EYES WITH ASTIGMATISM AND PRESBYOPIA: Status: ACTIVE | Noted: 2022-05-10

## 2023-01-23 PROBLEM — Z86.018 HISTORY OF DYSPLASTIC NEVUS: Status: ACTIVE | Noted: 2022-01-31

## 2023-01-23 PROBLEM — H11.443 CONJUNCTIVAL CYST OF BOTH EYES: Status: ACTIVE | Noted: 2022-05-10

## 2023-01-23 NOTE — PROGRESS NOTES
Oncology Risk Management Consultation:  Date on this visit: 1/24/2023    Brittney Morris  requires high risk screening and surveillance to reduce  her risk of cancer secondary to a family history of breast cancer in her sister at age 42, and a maternal aunt in her 50s,     She has a 28.9%  lifetime risk for breast cancer by the Bulmaro model and a 42.9% lifetime risk by the JACOB model. Her 5 year risk is 4.2% by the JACOB model. Also present at this visit is Victorina Cardoza DNP Student at the [facility]     Primary Physician:  Johana Agrawal MD     History Of Present Illness:  Ms. Morris is a very pleasant , healthy 47 year old female who presents with a family history of breast cancer.      Pertinent history:  Menarche at 12.  First child at 38.  12/3/2012 - Partial molar pregnancy.  Premenopausal.  Ovaries intact  1/22/2021-  Uterus, cervix, and bilateral fallopian tubes removed for bleeding: FINAL DIAGNOSIS:   Uterus, cervix, bilateral fallopian tubes, total hysterectomy and  bilateral salpingectomy   - Cervix: No significant abnormality   - Endometrium: Gland and stromal changes consistent with exogenous hormone effect   - Myometrium: Leiomyoma   - Fallopian tubes, bilateral: No significant abnormality   Breast Density: heterogeneously dense.  Hx of OCP use x3-4 years.  Vasectomy for birth control.  No hx of HRT.  6/7/2018: Right breast core biopsies x2:                 Site A: 1230 position, 2 cm from nipple measuring 0.6 cm biopsied.  An X shaped clip was placed                Site B; 0.6 cm ill-defined hypoechoic lesion at 12:00, 4 cm from the nipple was biopsied -Q shaped clip was placed   Percutaneous core needle biopsy, right:    A.  Benign intraductal papilloma  B: Fragments of a radial scar (complex sclerosing lesion (with associated calcifications.  There is no evidence of invasive malignancy on this biopsy specimen.  Imaging reviewed with Dr. Santiago and Dr. Parisi who agreed  the pathologic findings are concordant with radiologic findings.     2018 - Excisional biopsy, right breast: no evidence of residual radial scar, fibrocystic changes with usual ductal hyperplasia and microcalcifications. Negative for in situ or invasive carcinoma.  No hx of atypia or malignancy.   2021- Right stereotactic core biopsy, Pathology results:  RIGHT BREAST, 9:00, MID DEPTH, STEREOTACTIC CORE BIOPSY:   - Fibrocystic change (including apocrine metaplasia and microcysts),   columnar cell change, adenomyoepithelial   adenosis (focal), and usual duct hyperplasia (UDH).   - Benign intraductal papilloma (2 mm).   - Calcifications associated with benign acini.   - Negative for atypia or malignancy.   History of low dose tamoxifen from April-2019; stopped due to dizziness, side effects.     Genetic testin2019 NEGATIVE for mutations in the MARIIA, BRCA1, BRCA2, BRIP1, CDH1, CHEK2, EPCAM, MLH1, MSH2, MSH6, NBN, NF1, PALB2, PMS2, PTEN, RAD51C, RAD51D, STK11, and TP53 genes using a Custom Next-Cancer (19 genes, a combination of Gyn Plus and BRCA plus + NBN, STK11, and NF1) panel  from [employer].  No mutations were found in any of the 19 genes analyzed. This test involved sequencing and deletion/duplication analysis of all genes with the exception of EPCAM (deletions/duplications only). This testing was done because of her family history of her sister's breast cancer diagnosed at age 42, and her maternal aunt's breast cancer diagnosed in her 50's.     Pertinent screening history:  2014 right breast ultrasound for palpable right axillary lump. Patient is currently breast-feeding.  BI-RADS Category 1.     2015: Baseline screening mammogram, BI-RADS 1.   10/11/2016: Screening Tomosynthesis mammogram, BI-RADS 1  .10/12/2017: Screening Tomosynthesis mammogram, BI-RADS 1.   2018 -- Digital mammogram diagnostic unilateral tomosynthesis and Ultrasound of the right medial breast:  An  oval hypoechoic solid-appearing mass is present at the 12 o'clock position 4 cm from the nipple measuring 0.6 cm.  A round hypoechoic mass with through transmission is present at the 1230 position, 2 cm from the nipple measuring 0.6 cm which may represent a complicated cyst.  Scattered tiny benign cysts are noted in the medial right breast. BI-RADS category: 4 suspicious -ultrasound-guided biopsy recommended.  12/5/2018- Screening tomosynthesis mammogram, BiRads2  5/6/2019- Breast MRI, BiRads2  12/12/2019- Screening tomosynthesis mammogram, BiRads1  1/7/2021- Screening tomosynthesis mammogram, BiRads0 for Post biopsy changes right breast. Possible developing  calcifications mid depth right breast just lateral to central core mid  depth. (CC josafat 30/72). No significant change left breast.  1/11/2021- Diagnostic tomosynthesis mammogram, BiRads4 for group of amorphous calcifications in the slightly upper slightly outer right breast at mid depth posterior to the site of prior excision, for which additional evaluation with stereotactic guided biopsy is indicated.  1/19/2021- Biopsy:  2 mm benign papilloma.   7/2/2021- Breast MRI and diagnostic tomosynthesis mammogram of right breast, both BiRads1  1/18/2022- Screening tomosynthesis mammogram, BiRads1  7/8/2022- Breast MRI, BiRads2     At this visit, she denies new fatigue, breast asymmetry, lumps, masses, thickening, pain, nipple discharge and skin changes.       Past Medical/Surgical History:  Past Medical History:   Diagnosis Date     Anxiety      Missed ab      Seizures (H) 1985    Seizure as a child. Took meds for 2 years.     UTI (urinary tract infection)      Past Surgical History:   Procedure Laterality Date     BIOPSY BREAST SEED LOCALIZATION Right 8/22/2018    Procedure: BIOPSY BREAST SEED LOCALIZATION;  RIGHT SEED LOCALIZED BREAST BIOPSY ;  Surgeon: Naomi Lewis MD;  [facility]     DILATION AND CURETTAGE SUCTION  12/3/2012    Procedure: DILATION AND  CURETTAGE SUCTION;  SUCTION DILATION AND CURETTAGE;  Surgeon: Nargis Gordon MD;  Location: [facility]     wisdom teeth[         Allergies:  Allergies as of 01/24/2023     (No Known Allergies)       Current Medications:  Current Outpatient Medications   Medication Sig Dispense Refill     cholecalciferol 25 MCG (1000 UT) TABS Take 1,000 Units by mouth       escitalopram (LEXAPRO) 10 MG tablet TAKE HALF A TABLET FOR THE 1ST WEEK, THEN TAKE A FULL TABLET A DAY.       levETIRAcetam (KEPPRA) 500 MG tablet TAKE 1 TABLET BY MOUTH TWICE A DAY       vitamin C (ASCORBIC ACID) 500 MG tablet Take 500 mg by mouth       meclizine (ANTIVERT) 25 MG tablet Take 1 tablet (25 mg) by mouth 3 times daily as needed for dizziness (Patient not taking: Reported on 12/18/2020) 20 tablet 0        Family History:  Family History   Problem Relation Age of Onset     Hypertension Mother      Breast Cancer Sister 42     Breast Cancer Maternal Aunt 50     Esophageal Cancer Maternal Uncle 70       Social History:  Social History     Socioeconomic History     Marital status:      Spouse name: Jamison     Number of children: 2     Years of education: Not on file     Highest education level: Not on file   Occupational History     Employer: [employer]   Tobacco Use     Smoking status: Never     Smokeless tobacco: Never   Substance and Sexual Activity     Alcohol use: Yes     Comment: occas     Drug use: No     Sexual activity: Yes     Partners: Male     Birth control/protection: Male Surgical, Female Surgical   Other Topics Concern     Parent/sibling w/ CABG, MI or angioplasty before 65F 55M? Not Asked   Social History Narrative     Not on file     Social Determinants of Health     Financial Resource Strain: Not on file   Food Insecurity: Not on file   Transportation Needs: Not on file   Physical Activity: Not on file   Stress: Not on file   Social Connections: Not on file   Intimate Partner Violence: Not on file   Housing  Stability: Not on file       Physical Exam:  /89 (BP Location: Right arm, Patient Position: Sitting, Cuff Size: Adult Regular)   Pulse 70   Temp 97.4  F (36.3  C) (Oral)   Resp 16   Wt 63 kg (139 lb)   LMP 08/01/2018 (Approximate)   SpO2 100%   BMI 24.62 kg/m      GENERAL APPEARANCE: healthy, alert and no distress     NECK: no adenopathy, no asymmetry or masses     LYMPHATICS: single enlarged cervical lymph node on left side; no supracervical or axillary lymphadenopathy noted.     RESP: lungs clear to auscultation - no rales, rhonchi or wheezes    BREAST: A multipositional, bilateral breast exam was performed.  Fairly symmetrical, nipples everted bilaterally, well healed pink linear scar above right areola in upper outer quadrant. Right breast: no palpable dominant masses, no nipple discharge, no skin changes. Dense tissue with multiple nodular, strings of fibrous tissue on right breast>left.  Right axilla: no palpable adenopathy. Left breast: no palpable dominant masses, no nipple discharge, no skin changes. Left axilla: no palpable adenopathy. Dense tissue.       CARDIOVASCULAR: regular rate and rhythm, normal S1 S2, no S3 or S4 and no murmur.       SKIN: no suspicious lesions or rashes    Laboratory/Imaging Studies  No results found for any visits on 01/24/23.    ASSESSMENT  We discussed her last screening tests and reviewed results.  We  reviewed her family history.  There are no changes to report.  She  continues to do well with her health promotion habits; she had COVID in October but has not had any other health issues since then.  We discussed the plan below and she says that she feels very comfortable continuing with semiannual screening, as it helps her feel more confident that any breast cancer that would be found to be caught early.  Her exam today is unremarkable and she will proceed with the following plan.  Individualized Surveillance Plan for women  With 20% or greater lifetime risk of  breast cancer   Per NCCN Breast Cancer Screening and Diagnosis Guidelines Version 1.2022   Recommended screening Test or procedure Last done Next Scheduled    Clinical encounter Clinical exam every 6-12 months.   Refer to genetic counseling if not already done.  Consider risk reduction strategies.   1/24/2023 January 2024   However, some family histories with breast cancers at a very young age, may warrant screening starting earlier.    *May begin at age 40 if breast cancers in the family occur at later ages.    Annual mammogram beginning 10 years younger than the earliest breast cancer in the family but not prior to age 30.    Recommend annual breast MRI to begin 10 years younger than the earliest breast cancer in the family but not prior to age 25.    Breast MRIs are preferably done on day 7-15 of the menstrual cycle in premenopausal women.   1/18/2022- Screening tomosynthesis mammogram, BiRads1    7/8/2022- Breast MRI, BiRads2     Breast MRI in July (7/9 or later)    Next mammogram in January 2024   Breast screening for patients at high risk due to thoracic radiation between the ages of 10-30   Annual clinical exam beginning 8 years after radiation therapy.    Annual screening mammogram beginning at age 30 or 8 years after radiation therapy    Annual breast MRI, beginning at age 25 or 8 years after radiation therapy.       NA     NA   Women who have a lifetime risk of >20% based on history of LCIS or ADH/ALH Annual screening mammogram beginning at age of LCIS or ADH/ALH but not prior to age 30.    Consider annual MRI to begin at age of diagnosis of LCIS or ADH/ALH but not prior to age 25.    Consider risk reducing strategies.   NA   NA    Recommend risk reducing strategies for women with 1.7% 5 year risk of breast cancer. History of low dose tamoxifen from April-September 2019; stopped due to dizziness, side effects.      I spent a total of 32 minutes on the day of the visit. Please see the note for further  information on patient assessment and treatment.    Imani Gallagher, KUSH-CNS, OCN, AGN-BC  Clinical Nurse Specialist  Cancer Risk Management Program  [facility]    CC: Johana Agrawal MD

## 2023-01-24 ENCOUNTER — ONCOLOGY VISIT (OUTPATIENT)
Dept: ONCOLOGY | Facility: CLINIC | Age: 48
End: 2023-01-24
Attending: CLINICAL NURSE SPECIALIST
Payer: COMMERCIAL

## 2023-01-24 ENCOUNTER — ANCILLARY PROCEDURE (OUTPATIENT)
Dept: MAMMOGRAPHY | Facility: CLINIC | Age: 48
End: 2023-01-24
Attending: CLINICAL NURSE SPECIALIST
Payer: COMMERCIAL

## 2023-01-24 VITALS
TEMPERATURE: 97.4 F | RESPIRATION RATE: 16 BRPM | SYSTOLIC BLOOD PRESSURE: 132 MMHG | WEIGHT: 139 LBS | BODY MASS INDEX: 24.62 KG/M2 | HEART RATE: 70 BPM | OXYGEN SATURATION: 100 % | DIASTOLIC BLOOD PRESSURE: 89 MMHG

## 2023-01-24 DIAGNOSIS — D24.1 PAPILLOMA OF RIGHT BREAST: ICD-10-CM

## 2023-01-24 DIAGNOSIS — Z12.39 BREAST CANCER SCREENING, HIGH RISK PATIENT: Primary | ICD-10-CM

## 2023-01-24 DIAGNOSIS — R92.30 DENSE BREAST: ICD-10-CM

## 2023-01-24 DIAGNOSIS — Z80.3 FAMILY HISTORY OF MALIGNANT NEOPLASM OF BREAST: ICD-10-CM

## 2023-01-24 DIAGNOSIS — Z12.39 BREAST CANCER SCREENING, HIGH RISK PATIENT: ICD-10-CM

## 2023-01-24 PROCEDURE — 99214 OFFICE O/P EST MOD 30 MIN: CPT | Performed by: CLINICAL NURSE SPECIALIST

## 2023-01-24 PROCEDURE — 77063 BREAST TOMOSYNTHESIS BI: CPT | Mod: GC | Performed by: STUDENT IN AN ORGANIZED HEALTH CARE EDUCATION/TRAINING PROGRAM

## 2023-01-24 PROCEDURE — 77067 SCR MAMMO BI INCL CAD: CPT | Mod: GC | Performed by: STUDENT IN AN ORGANIZED HEALTH CARE EDUCATION/TRAINING PROGRAM

## 2023-01-24 PROCEDURE — G0463 HOSPITAL OUTPT CLINIC VISIT: HCPCS

## 2023-01-24 PROCEDURE — 99212 OFFICE O/P EST SF 10 MIN: CPT | Performed by: CLINICAL NURSE SPECIALIST

## 2023-01-24 RX ORDER — ESCITALOPRAM OXALATE 10 MG/1
TABLET ORAL
COMMUNITY
Start: 2022-09-17 | End: 2023-10-31 | Stop reason: SINTOL

## 2023-01-24 ASSESSMENT — PAIN SCALES - GENERAL: PAINLEVEL: NO PAIN (0)

## 2023-01-24 NOTE — NURSING NOTE
"Oncology Rooming Note    January 24, 2023 9:05 AM   Brittney Morris is a 47 year old female who presents for:    Chief Complaint   Patient presents with     Oncology Clinic Visit     Cancer risk management      Initial Vitals: /89 (BP Location: Right arm, Patient Position: Sitting, Cuff Size: Adult Regular)   Pulse 70   Temp 97.4  F (36.3  C) (Oral)   Resp 16   Wt 63 kg (139 lb)   LMP 08/01/2018 (Approximate)   SpO2 100%   BMI 24.62 kg/m   Estimated body mass index is 24.62 kg/m  as calculated from the following:    Height as of 1/18/22: 1.6 m (5' 3\").    Weight as of this encounter: 63 kg (139 lb). Body surface area is 1.67 meters squared.  No Pain (0) Comment: Data Unavailable   Patient's last menstrual period was 08/01/2018 (approximate).  Allergies reviewed: Yes  Medications reviewed: Yes    Medications: Medication refills not needed today.  Pharmacy name entered into [employer]:    [id number] - [place], [place] - [address] AT  [address] [facility] [place] - ONE [facility] AT PORTAL TO REGISTERED [facility] SITES    Clinical concerns: No additional clinical concerns.        Brandee Magana), LPN January 24, 2023 9:05 AM              "

## 2023-01-24 NOTE — PATIENT INSTRUCTIONS
Individualized Surveillance Plan for women  With 20% or greater lifetime risk of breast cancer   Per NCCN Breast Cancer Screening and Diagnosis Guidelines Version 1.2022   Recommended screening Test or procedure Last done Next Scheduled    Clinical encounter Clinical exam every 6-12 months.   Refer to genetic counseling if not already done.  Consider risk reduction strategies.   1/24/2023 January 2024   However, some family histories with breast cancers at a very young age, may warrant screening starting earlier.    *May begin at age 40 if breast cancers in the family occur at later ages.    Annual mammogram beginning 10 years younger than the earliest breast cancer in the family but not prior to age 30.    Recommend annual breast MRI to begin 10 years younger than the earliest breast cancer in the family but not prior to age 25.    Breast MRIs are preferably done on day 7-15 of the menstrual cycle in premenopausal women.   1/18/2022- Screening tomosynthesis mammogram, BiRads1    7/8/2022- Breast MRI, BiRads2     Breast MRI in July (7/9 or later)    Next mammogram in January 2024   Breast screening for patients at high risk due to thoracic radiation between the ages of 10-30   Annual clinical exam beginning 8 years after radiation therapy.    Annual screening mammogram beginning at age 30 or 8 years after radiation therapy    Annual breast MRI, beginning at age 25 or 8 years after radiation therapy.       NA     NA   Women who have a lifetime risk of >20% based on history of LCIS or ADH/ALH Annual screening mammogram beginning at age of LCIS or ADH/ALH but not prior to age 30.    Consider annual MRI to begin at age of diagnosis of LCIS or ADH/ALH but not prior to age 25.    Consider risk reducing strategies.   NA   NA    Recommend risk reducing strategies for women with 1.7% 5 year risk of breast cancer. History of low dose tamoxifen from April-September 2019; stopped due to dizziness, side effects.

## 2023-01-24 NOTE — LETTER
1/24/2023         RE: Brittney Morris  [address]  [facility] [address]        Dear Colleague,    Thank you for referring your patient, Brittney Morris, to the [facility]. Please see a copy of my visit note below.    Oncology Risk Management Consultation:  Date on this visit: 1/24/2023    Brittney Morris  requires high risk screening and surveillance to reduce  her risk of cancer secondary to a family history of breast cancer in her sister at age 42, and a maternal aunt in her 50s,     She has a 28.9%  lifetime risk for breast cancer by the Ublmaro model and a 42.9% lifetime risk by the JACOB model. Her 5 year risk is 4.2% by the JACOB model. Also present at this visit is Victorina Cardoza, DNP Student at the [employer]     Primary Physician:  Johana Agrawal MD     History Of Present Illness:  Ms. Morris is a very pleasant , healthy 47 year old female who presents with a family history of breast cancer.      Pertinent history:  Menarche at 12.  First child at 38.  12/3/2012 - Partial molar pregnancy.  Premenopausal.  Ovaries intact  1/22/2021-  Uterus, cervix, and bilateral fallopian tubes removed for bleeding: FINAL DIAGNOSIS:   Uterus, cervix, bilateral fallopian tubes, total hysterectomy and  bilateral salpingectomy   - Cervix: No significant abnormality   - Endometrium: Gland and stromal changes consistent with exogenous hormone effect   - Myometrium: Leiomyoma   - Fallopian tubes, bilateral: No significant abnormality   Breast Density: heterogeneously dense.  Hx of OCP use x3-4 years.  Vasectomy for birth control.  No hx of HRT.  6/7/2018: Right breast core biopsies x2:                 Site A: 1230 position, 2 cm from nipple measuring 0.6 cm biopsied.  An X shaped clip was placed                Site B; 0.6 cm ill-defined hypoechoic lesion at 12:00, 4 cm from the nipple was biopsied -Q shaped clip was placed   Percutaneous core needle  biopsy, right:    A.  Benign intraductal papilloma  B: Fragments of a radial scar (complex sclerosing lesion (with associated calcifications.  There is no evidence of invasive malignancy on this biopsy specimen.  Imaging reviewed with Dr. Santiago and Dr. Parisi who agreed the pathologic findings are concordant with radiologic findings.     2018 - Excisional biopsy, right breast: no evidence of residual radial scar, fibrocystic changes with usual ductal hyperplasia and microcalcifications. Negative for in situ or invasive carcinoma.  No hx of atypia or malignancy.   2021- Right stereotactic core biopsy, Pathology results:  RIGHT BREAST, 9:00, MID DEPTH, STEREOTACTIC CORE BIOPSY:   - Fibrocystic change (including apocrine metaplasia and microcysts),   columnar cell change, adenomyoepithelial   adenosis (focal), and usual duct hyperplasia (UDH).   - Benign intraductal papilloma (2 mm).   - Calcifications associated with benign acini.   - Negative for atypia or malignancy.   History of low dose tamoxifen from April-2019; stopped due to dizziness, side effects.     Genetic testin2019 NEGATIVE for mutations in the MARIIA, BRCA1, BRCA2, BRIP1, CDH1, CHEK2, EPCAM, MLH1, MSH2, MSH6, NBN, NF1, PALB2, PMS2, PTEN, RAD51C, RAD51D, STK11, and TP53 genes using a Custom Next-Cancer (19 genes, a combination of Gyn Plus and BRCA plus + NBN, STK11, and NF1) panel  from [employer].  No mutations were found in any of the 19 genes analyzed. This test involved sequencing and deletion/duplication analysis of all genes with the exception of EPCAM (deletions/duplications only). This testing was done because of her family history of her sister's breast cancer diagnosed at age 42, and her maternal aunt's breast cancer diagnosed in her 50's.     Pertinent screening history:  2014 right breast ultrasound for palpable right axillary lump. Patient is currently breast-feeding.  BI-RADS Category 1.     2015:  Baseline screening mammogram, BI-RADS 1.   10/11/2016: Screening Tomosynthesis mammogram, BI-RADS 1  .10/12/2017: Screening Tomosynthesis mammogram, BI-RADS 1.   6/6/2018 -- Digital mammogram diagnostic unilateral tomosynthesis and Ultrasound of the right medial breast:  An oval hypoechoic solid-appearing mass is present at the 12 o'clock position 4 cm from the nipple measuring 0.6 cm.  A round hypoechoic mass with through transmission is present at the 1230 position, 2 cm from the nipple measuring 0.6 cm which may represent a complicated cyst.  Scattered tiny benign cysts are noted in the medial right breast. BI-RADS category: 4 suspicious -ultrasound-guided biopsy recommended.  12/5/2018- Screening tomosynthesis mammogram, BiRads2  5/6/2019- Breast MRI, BiRads2  12/12/2019- Screening tomosynthesis mammogram, BiRads1  1/7/2021- Screening tomosynthesis mammogram, BiRads0 for Post biopsy changes right breast. Possible developing  calcifications mid depth right breast just lateral to central core mid  depth. (CC josafat 30/72). No significant change left breast.  1/11/2021- Diagnostic tomosynthesis mammogram, BiRads4 for group of amorphous calcifications in the slightly upper slightly outer right breast at mid depth posterior to the site of prior excision, for which additional evaluation with stereotactic guided biopsy is indicated.  1/19/2021- Biopsy:  2 mm benign papilloma.   7/2/2021- Breast MRI and diagnostic tomosynthesis mammogram of right breast, both BiRads1  1/18/2022- Screening tomosynthesis mammogram, BiRads1  7/8/2022- Breast MRI, BiRads2     At this visit, she denies new fatigue, breast asymmetry, lumps, masses, thickening, pain, nipple discharge and skin changes.       Past Medical/Surgical History:  Past Medical History:   Diagnosis Date     Anxiety      Missed ab      Seizures (H) 1985    Seizure as a child. Took meds for 2 years.     UTI (urinary tract infection)      Past Surgical History:   Procedure  Laterality Date     BIOPSY BREAST SEED LOCALIZATION Right 8/22/2018    Procedure: BIOPSY BREAST SEED LOCALIZATION;  RIGHT SEED LOCALIZED BREAST BIOPSY ;  Surgeon: Naomi Lewis MD;  Location: [facility]     DILATION AND CURETTAGE SUCTION  12/3/2012    Procedure: DILATION AND CURETTAGE SUCTION;  SUCTION DILATION AND CURETTAGE;  Surgeon: Nargis Gordon MD;  Location: [facility]     wisdom teeth[         Allergies:  Allergies as of 01/24/2023     (No Known Allergies)       Current Medications:  Current Outpatient Medications   Medication Sig Dispense Refill     cholecalciferol 25 MCG (1000 UT) TABS Take 1,000 Units by mouth       escitalopram (LEXAPRO) 10 MG tablet TAKE HALF A TABLET FOR THE 1ST WEEK, THEN TAKE A FULL TABLET A DAY.       levETIRAcetam (KEPPRA) 500 MG tablet TAKE 1 TABLET BY MOUTH TWICE A DAY       vitamin C (ASCORBIC ACID) 500 MG tablet Take 500 mg by mouth       meclizine (ANTIVERT) 25 MG tablet Take 1 tablet (25 mg) by mouth 3 times daily as needed for dizziness (Patient not taking: Reported on 12/18/2020) 20 tablet 0        Family History:  Family History   Problem Relation Age of Onset     Hypertension Mother      Breast Cancer Sister 42     Breast Cancer Maternal Aunt 50     Esophageal Cancer Maternal Uncle 70       Social History:  Social History     Socioeconomic History     Marital status:      Spouse name: Jamison     Number of children: 2     Years of education: Not on file     Highest education level: Not on file   Occupational History     Employer: [employer]   Tobacco Use     Smoking status: Never     Smokeless tobacco: Never   Substance and Sexual Activity     Alcohol use: Yes     Comment: occas     Drug use: No     Sexual activity: Yes     Partners: Male     Birth control/protection: Male Surgical, Female Surgical   Other Topics Concern     Parent/sibling w/ CABG, MI or angioplasty before 65F 55M? Not Asked   Social History Narrative     Not on file     Social  Determinants of Health     Financial Resource Strain: Not on file   Food Insecurity: Not on file   Transportation Needs: Not on file   Physical Activity: Not on file   Stress: Not on file   Social Connections: Not on file   Intimate Partner Violence: Not on file   Housing Stability: Not on file       Physical Exam:  /89 (BP Location: Right arm, Patient Position: Sitting, Cuff Size: Adult Regular)   Pulse 70   Temp 97.4  F (36.3  C) (Oral)   Resp 16   Wt 63 kg (139 lb)   LMP 08/01/2018 (Approximate)   SpO2 100%   BMI 24.62 kg/m      GENERAL APPEARANCE: healthy, alert and no distress     NECK: no adenopathy, no asymmetry or masses     LYMPHATICS: single enlarged cervical lymph node on left side; no supracervical or axillary lymphadenopathy noted.     RESP: lungs clear to auscultation - no rales, rhonchi or wheezes    BREAST: A multipositional, bilateral breast exam was performed.  Fairly symmetrical, nipples everted bilaterally, well healed pink linear scar above right areola in upper outer quadrant. Right breast: no palpable dominant masses, no nipple discharge, no skin changes. Dense tissue with multiple nodular, strings of fibrous tissue on right breast>left.  Right axilla: no palpable adenopathy. Left breast: no palpable dominant masses, no nipple discharge, no skin changes. Left axilla: no palpable adenopathy. Dense tissue.       CARDIOVASCULAR: regular rate and rhythm, normal S1 S2, no S3 or S4 and no murmur.       SKIN: no suspicious lesions or rashes    Laboratory/Imaging Studies  No results found for any visits on 01/24/23.    ASSESSMENT  We discussed her last screening tests and reviewed results.  We  reviewed her family history.  There are no changes to report.  She  continues to do well with her health promotion habits; she had COVID in October but has not had any other health issues since then.  We discussed the plan below and she says that she feels very comfortable continuing with  semiannual screening, as it helps her feel more confident that any breast cancer that would be found to be caught early.  Her exam today is unremarkable and she will proceed with the following plan.  Individualized Surveillance Plan for women  With 20% or greater lifetime risk of breast cancer   Per NCCN Breast Cancer Screening and Diagnosis Guidelines Version 1.2022   Recommended screening Test or procedure Last done Next Scheduled    Clinical encounter Clinical exam every 6-12 months.   Refer to genetic counseling if not already done.  Consider risk reduction strategies.   1/24/2023 January 2024   However, some family histories with breast cancers at a very young age, may warrant screening starting earlier.    *May begin at age 40 if breast cancers in the family occur at later ages.    Annual mammogram beginning 10 years younger than the earliest breast cancer in the family but not prior to age 30.    Recommend annual breast MRI to begin 10 years younger than the earliest breast cancer in the family but not prior to age 25.    Breast MRIs are preferably done on day 7-15 of the menstrual cycle in premenopausal women.   1/18/2022- Screening tomosynthesis mammogram, BiRads1    7/8/2022- Breast MRI, BiRads2     Breast MRI in July (7/9 or later)    Next mammogram in January 2024   Breast screening for patients at high risk due to thoracic radiation between the ages of 10-30   Annual clinical exam beginning 8 years after radiation therapy.    Annual screening mammogram beginning at age 30 or 8 years after radiation therapy    Annual breast MRI, beginning at age 25 or 8 years after radiation therapy.       NA     NA   Women who have a lifetime risk of >20% based on history of LCIS or ADH/ALH Annual screening mammogram beginning at age of LCIS or ADH/ALH but not prior to age 30.    Consider annual MRI to begin at age of diagnosis of LCIS or ADH/ALH but not prior to age 25.    Consider risk reducing strategies.   NA   NA     Recommend risk reducing strategies for women with 1.7% 5 year risk of breast cancer. History of low dose tamoxifen from April-September 2019; stopped due to dizziness, side effects.      I spent a total of 32 minutes on the day of the visit. Please see the note for further information on patient assessment and treatment.    KUSH Nix-CNS, OCN, AGN-BC  Clinical Nurse Specialist  Cancer Risk Management Program  [facility]    CC: Johana Agrawal MD

## 2023-07-18 ENCOUNTER — ANCILLARY PROCEDURE (OUTPATIENT)
Dept: MRI IMAGING | Facility: CLINIC | Age: 48
End: 2023-07-18
Attending: CLINICAL NURSE SPECIALIST
Payer: COMMERCIAL

## 2023-07-18 DIAGNOSIS — Z80.3 FAMILY HISTORY OF MALIGNANT NEOPLASM OF BREAST: ICD-10-CM

## 2023-07-18 DIAGNOSIS — Z12.39 BREAST CANCER SCREENING, HIGH RISK PATIENT: ICD-10-CM

## 2023-07-18 DIAGNOSIS — R92.30 DENSE BREAST: ICD-10-CM

## 2023-07-18 PROCEDURE — A9585 GADOBUTROL INJECTION: HCPCS | Mod: JZ

## 2023-07-18 PROCEDURE — 77049 MRI BREAST C-+ W/CAD BI: CPT | Mod: GC

## 2023-07-18 RX ORDER — GADOBUTROL 604.72 MG/ML
7.5 INJECTION INTRAVENOUS ONCE
Status: COMPLETED | OUTPATIENT
Start: 2023-07-18 | End: 2023-07-18

## 2023-07-18 RX ADMIN — GADOBUTROL 6 ML: 604.72 INJECTION INTRAVENOUS at 10:25

## 2023-07-22 ENCOUNTER — HEALTH MAINTENANCE LETTER (OUTPATIENT)
Age: 48
End: 2023-07-22

## 2023-10-31 ENCOUNTER — OFFICE VISIT (OUTPATIENT)
Dept: FAMILY MEDICINE | Facility: CLINIC | Age: 48
End: 2023-10-31
Payer: COMMERCIAL

## 2023-10-31 VITALS
HEIGHT: 62 IN | RESPIRATION RATE: 16 BRPM | OXYGEN SATURATION: 100 % | BODY MASS INDEX: 24.53 KG/M2 | TEMPERATURE: 97.7 F | DIASTOLIC BLOOD PRESSURE: 97 MMHG | SYSTOLIC BLOOD PRESSURE: 140 MMHG | WEIGHT: 133.3 LBS | HEART RATE: 95 BPM

## 2023-10-31 DIAGNOSIS — Z11.59 NEED FOR HEPATITIS C SCREENING TEST: ICD-10-CM

## 2023-10-31 DIAGNOSIS — R09.89 THROAT CLEARING: ICD-10-CM

## 2023-10-31 DIAGNOSIS — Z12.39 BREAST CANCER SCREENING, HIGH RISK PATIENT: ICD-10-CM

## 2023-10-31 DIAGNOSIS — Z80.3 FAMILY HISTORY OF MALIGNANT NEOPLASM OF BREAST: ICD-10-CM

## 2023-10-31 DIAGNOSIS — F41.1 GAD (GENERALIZED ANXIETY DISORDER): ICD-10-CM

## 2023-10-31 DIAGNOSIS — G40.909 SEIZURE DISORDER (H): ICD-10-CM

## 2023-10-31 DIAGNOSIS — M54.9 UPPER BACK PAIN ON RIGHT SIDE: ICD-10-CM

## 2023-10-31 DIAGNOSIS — Z12.4 CERVICAL CANCER SCREENING: ICD-10-CM

## 2023-10-31 DIAGNOSIS — Z13.6 CARDIOVASCULAR SCREENING; LDL GOAL LESS THAN 130: ICD-10-CM

## 2023-10-31 DIAGNOSIS — Z00.00 ROUTINE GENERAL MEDICAL EXAMINATION AT A HEALTH CARE FACILITY: Primary | ICD-10-CM

## 2023-10-31 DIAGNOSIS — Z87.898 HISTORY OF HEARTBURN: ICD-10-CM

## 2023-10-31 DIAGNOSIS — E56.9 VITAMIN DEFICIENCY: ICD-10-CM

## 2023-10-31 PROBLEM — F41.9 ANXIETY: Status: ACTIVE | Noted: 2018-08-21

## 2023-10-31 LAB
ALBUMIN SERPL BCG-MCNC: 4.4 G/DL (ref 3.5–5.2)
ALP SERPL-CCNC: 75 U/L (ref 35–104)
ALT SERPL W P-5'-P-CCNC: 25 U/L (ref 0–50)
ANION GAP SERPL CALCULATED.3IONS-SCNC: 9 MMOL/L (ref 7–15)
AST SERPL W P-5'-P-CCNC: 25 U/L (ref 0–45)
BILIRUB SERPL-MCNC: 0.5 MG/DL
BUN SERPL-MCNC: 7.6 MG/DL (ref 6–20)
CALCIUM SERPL-MCNC: 9.2 MG/DL (ref 8.6–10)
CHLORIDE SERPL-SCNC: 103 MMOL/L (ref 98–107)
CHOLEST SERPL-MCNC: 218 MG/DL
CREAT SERPL-MCNC: 0.72 MG/DL (ref 0.51–0.95)
DEPRECATED HCO3 PLAS-SCNC: 27 MMOL/L (ref 22–29)
EGFRCR SERPLBLD CKD-EPI 2021: >90 ML/MIN/1.73M2
ERYTHROCYTE [DISTWIDTH] IN BLOOD BY AUTOMATED COUNT: 11.7 % (ref 10–15)
GLUCOSE SERPL-MCNC: 97 MG/DL (ref 70–99)
HCT VFR BLD AUTO: 41.7 % (ref 35–47)
HCV AB SERPL QL IA: NONREACTIVE
HDLC SERPL-MCNC: 78 MG/DL
HGB BLD-MCNC: 13.9 G/DL (ref 11.7–15.7)
LDLC SERPL CALC-MCNC: 126 MG/DL
MCH RBC QN AUTO: 30.4 PG (ref 26.5–33)
MCHC RBC AUTO-ENTMCNC: 33.3 G/DL (ref 31.5–36.5)
MCV RBC AUTO: 91 FL (ref 78–100)
NONHDLC SERPL-MCNC: 140 MG/DL
PLATELET # BLD AUTO: 200 10E3/UL (ref 150–450)
POTASSIUM SERPL-SCNC: 3.4 MMOL/L (ref 3.4–5.3)
PROT SERPL-MCNC: 7.1 G/DL (ref 6.4–8.3)
RBC # BLD AUTO: 4.57 10E6/UL (ref 3.8–5.2)
SODIUM SERPL-SCNC: 139 MMOL/L (ref 135–145)
TRIGL SERPL-MCNC: 72 MG/DL
WBC # BLD AUTO: 4.4 10E3/UL (ref 4–11)

## 2023-10-31 PROCEDURE — 85027 COMPLETE CBC AUTOMATED: CPT | Performed by: FAMILY MEDICINE

## 2023-10-31 PROCEDURE — 90686 IIV4 VACC NO PRSV 0.5 ML IM: CPT | Performed by: FAMILY MEDICINE

## 2023-10-31 PROCEDURE — 99214 OFFICE O/P EST MOD 30 MIN: CPT | Mod: 25 | Performed by: FAMILY MEDICINE

## 2023-10-31 PROCEDURE — 90471 IMMUNIZATION ADMIN: CPT | Performed by: FAMILY MEDICINE

## 2023-10-31 PROCEDURE — 80061 LIPID PANEL: CPT | Performed by: FAMILY MEDICINE

## 2023-10-31 PROCEDURE — 91320 SARSCV2 VAC 30MCG TRS-SUC IM: CPT | Performed by: FAMILY MEDICINE

## 2023-10-31 PROCEDURE — 90480 ADMN SARSCOV2 VAC 1/ONLY CMP: CPT | Performed by: FAMILY MEDICINE

## 2023-10-31 PROCEDURE — 86803 HEPATITIS C AB TEST: CPT | Performed by: FAMILY MEDICINE

## 2023-10-31 PROCEDURE — 99386 PREV VISIT NEW AGE 40-64: CPT | Mod: 25 | Performed by: FAMILY MEDICINE

## 2023-10-31 PROCEDURE — 80053 COMPREHEN METABOLIC PANEL: CPT | Performed by: FAMILY MEDICINE

## 2023-10-31 PROCEDURE — 36415 COLL VENOUS BLD VENIPUNCTURE: CPT | Performed by: FAMILY MEDICINE

## 2023-10-31 ASSESSMENT — ANXIETY QUESTIONNAIRES
2. NOT BEING ABLE TO STOP OR CONTROL WORRYING: NEARLY EVERY DAY
7. FEELING AFRAID AS IF SOMETHING AWFUL MIGHT HAPPEN: MORE THAN HALF THE DAYS
GAD7 TOTAL SCORE: 13
6. BECOMING EASILY ANNOYED OR IRRITABLE: SEVERAL DAYS
1. FEELING NERVOUS, ANXIOUS, OR ON EDGE: MORE THAN HALF THE DAYS
5. BEING SO RESTLESS THAT IT IS HARD TO SIT STILL: SEVERAL DAYS
IF YOU CHECKED OFF ANY PROBLEMS ON THIS QUESTIONNAIRE, HOW DIFFICULT HAVE THESE PROBLEMS MADE IT FOR YOU TO DO YOUR WORK, TAKE CARE OF THINGS AT HOME, OR GET ALONG WITH OTHER PEOPLE: SOMEWHAT DIFFICULT
3. WORRYING TOO MUCH ABOUT DIFFERENT THINGS: NEARLY EVERY DAY
GAD7 TOTAL SCORE: 13

## 2023-10-31 ASSESSMENT — ENCOUNTER SYMPTOMS
SORE THROAT: 0
MYALGIAS: 0
DIARRHEA: 0
DIZZINESS: 0
ARTHRALGIAS: 0
PARESTHESIAS: 0
BREAST MASS: 0
ABDOMINAL PAIN: 0
HEMATURIA: 0
HEMATOCHEZIA: 0
HEADACHES: 0
FREQUENCY: 0
EYE PAIN: 0
NAUSEA: 0
DYSURIA: 0
JOINT SWELLING: 0
PALPITATIONS: 0
CONSTIPATION: 0
CHILLS: 0
COUGH: 0
NERVOUS/ANXIOUS: 1
SHORTNESS OF BREATH: 0
WEAKNESS: 0
FEVER: 0
HEARTBURN: 0

## 2023-10-31 ASSESSMENT — PAIN SCALES - GENERAL: PAINLEVEL: NO PAIN (0)

## 2023-10-31 ASSESSMENT — PATIENT HEALTH QUESTIONNAIRE - PHQ9: 5. POOR APPETITE OR OVEREATING: SEVERAL DAYS

## 2023-10-31 NOTE — PROGRESS NOTES
SUBJECTIVE:   CC: Brittney is an 48 year old who presents for preventive health visit.       10/31/2023     7:19 AM   Additional Questions   Roomed by Sherita       Healthy Habits:     Getting at least 3 servings of Calcium per day:  Yes    Bi-annual eye exam:  Yes    Dental care twice a year:  Yes    Sleep apnea or symptoms of sleep apnea:  None    Diet:  Regular (no restrictions)    Frequency of exercise:  4-5 days/week    Duration of exercise:  45-60 minutes    Taking medications regularly:  Yes    Medication side effects:  None    Concern with mild Cough   Intermittent and additional symptoms of draining in the throat   ________________________________________________________      Feels like she has drainage in the back of her throat.  More in the morning.   Has flonase- will try again.    R upper back tweak, also pain in R chest area.  Will try massage, msg if interested in PT.    Sees ob/gyn, [facility].  Had hysterectomy 1/2021 for excessive bleeding.    Following with Peri Gallagher, MRI in summer, and mammogram.    Anxiety-   Long-standing anxiety.  Sertraline worked well ~1250-1663 (stopped when it ran out during covid).  Trial of Lexapro in '22- se's of palpitations, improved after stopping.  Trial of sertraline again, but had diarrhea (was also on gerd meds then)      Heartburn-  Took omeprazole for a couple yrs, gradually went off.  Now tums as needed.    [place]  Working from home.      Today's PHQ-2 Score:       10/31/2023     7:17 AM   PHQ-2 ( 1999 Pfizer)   Q1: Little interest or pleasure in doing things 0   Q2: Feeling down, depressed or hopeless 0   PHQ-2 Score 0   Q1: Little interest or pleasure in doing things Not at all   Q2: Feeling down, depressed or hopeless Not at all   PHQ-2 Score 0           Have you ever done Advance Care Planning? (For example, a Health Directive, POLST, or a discussion with a medical provider or your loved ones about your  wishes): No, advance care planning information given to patient to review.  Patient plans to discuss their wishes with loved ones or provider.      Social History     Tobacco Use    Smoking status: Never    Smokeless tobacco: Never   Substance Use Topics    Alcohol use: Yes     Comment: mohit             10/31/2023     7:17 AM   Alcohol Use   Prescreen: >3 drinks/day or >7 drinks/week? Yes   AUDIT SCORE  6         10/31/2023     7:17 AM   AUDIT - Alcohol Use Disorders Identification Test - Reproduced from the World Health Organization Audit 2001 (Second Edition)   1.  How often do you have a drink containing alcohol? 4 or more times a week   2.  How many drinks containing alcohol do you have on a typical day when you are drinking? 1 or 2   3.  How often do you have five or more drinks on one occasion? Less than monthly   4.  How often during the last year have you found that you were not able to stop drinking once you had started? Never   5.  How often during the last year have you failed to do what was normally expected of you because of drinking? Never   6.  How often during the last year have you needed a first drink in the morning to get yourself going after a heavy drinking session? Never   7.  How often during the last year have you had a feeling of guilt or remorse after drinking? Less than monthly   8.  How often during the last year have you been unable to remember what happened the night before because of your drinking? Never   9.  Have you or someone else been injured because of your drinking? No   10. Has a relative, friend, doctor or other health care worker been concerned about your drinking or suggested you cut down? No   TOTAL SCORE 6     Reviewed orders with patient.  Reviewed health maintenance and updated orders accordingly - Yes      Lab work is in process  Labs reviewed in EPIC    Breast Cancer Screening:    FHS-7:       1/18/2022    11:45 AM 1/24/2023     9:45 AM   Breast CA Risk Assessment  (FHS-7)   Did any of your first-degree relatives have breast or ovarian cancer? No Yes   Did any of your relatives have bilateral breast cancer? No Unknown   Did any man in your family have breast cancer? No No   Did any woman in your family have breast and ovarian cancer? No No   Did any woman in your family have breast cancer before age 50 y? No Yes   Do you have 2 or more relatives with breast and/or ovarian cancer? No Yes   Do you have 2 or more relatives with breast and/or bowel cancer? No No       Mammogram Screening - Alternate mammogram schedule due to fam h/o breast cancer history  Pertinent mammograms are reviewed under the imaging tab.    History of abnormal Pap smear: Status post benign hysterectomy. Health Maintenance and Surgical History updated.     Reviewed and updated as needed this visit by clinical staff   Tobacco  Allergies  Meds  Problems  Med Hx  Surg Hx  Fam Hx          Reviewed and updated as needed this visit by Provider   Tobacco  Allergies  Meds  Problems  Med Hx  Surg Hx  Fam Hx             Review of Systems   Constitutional:  Negative for chills and fever.   HENT:  Negative for congestion, ear pain, hearing loss and sore throat.    Eyes:  Negative for pain and visual disturbance.   Respiratory:  Negative for cough and shortness of breath.    Cardiovascular:  Negative for chest pain, palpitations and peripheral edema.   Gastrointestinal:  Negative for abdominal pain, constipation, diarrhea, heartburn, hematochezia and nausea.   Breasts:  Negative for tenderness, breast mass and discharge.   Genitourinary:  Negative for dysuria, frequency, genital sores, hematuria, pelvic pain, urgency, vaginal bleeding and vaginal discharge.   Musculoskeletal:  Negative for arthralgias, joint swelling and myalgias.   Skin:  Negative for rash.   Neurological:  Negative for dizziness, weakness, headaches and paresthesias.   Psychiatric/Behavioral:  Negative for mood changes. The patient is  "nervous/anxious.           OBJECTIVE:   BP (!) 140/97   Pulse 95   Temp 97.7  F (36.5  C) (Temporal)   Resp 16   Ht 1.583 m (5' 2.32\")   Wt 60.5 kg (133 lb 4.8 oz)   LMP 08/01/2018 (Approximate)   SpO2 100%   Breastfeeding No   BMI 24.13 kg/m    Physical Exam  GENERAL APPEARANCE: healthy, alert and no distress  EYES: Eyes grossly normal to inspection, PERRL and conjunctivae and sclerae normal  HENT: ear canals and TM's normal, nose and mouth without ulcers or lesions, oropharynx clear and oral mucous membranes moist  NECK: no adenopathy, no asymmetry, masses, or scars and thyroid normal to palpation  RESP: lungs clear to auscultation - no rales, rhonchi or wheezes  BREAST: normal without masses, tenderness or nipple discharge and no palpable axillary masses or adenopathy  CV: regular rate and rhythm, normal S1 S2, no S3 or S4, no murmur, click or rub, no peripheral edema and peripheral pulses strong  ABDOMEN: soft, nontender, no hepatosplenomegaly, no masses and bowel sounds normal  MS: no musculoskeletal defects are noted and gait is age appropriate without ataxia  SKIN: no suspicious lesions or rashes  NEURO: Normal strength and tone, sensory exam grossly normal, mentation intact and speech normal  PSYCH: mentation appears normal and affect normal/bright    Diagnostic Test Results:  Labs reviewed in Epic    ASSESSMENT/PLAN:       ICD-10-CM    1. Routine general medical examination at a health care facility  Z00.00       2. KVNG (generalized anxiety disorder)  F41.1 OFFICE/OUTPT VISIT,EST,LEVL IV      3. History of heartburn  Z87.898 OFFICE/OUTPT VISIT,EST,LEVL IV      4. Throat clearing  R09.89 OFFICE/OUTPT VISIT,EST,LEVL IV      5. Upper back pain on right side  M54.9 OFFICE/OUTPT VISIT,EST,LEVL IV      6. Seizure disorder (H)  G40.909 Comprehensive metabolic panel (BMP + Alb, Alk Phos, ALT, AST, Total. Bili, TP)     CBC with platelets     Comprehensive metabolic panel (BMP + Alb, Alk Phos, ALT, AST, " Total. Bili, TP)     CBC with platelets      7. Vitamin deficiency  E56.9       8. Family history of malignant neoplasm of breast  Z80.3       9. Breast cancer screening, high risk patient  Z12.39       10. Need for hepatitis C screening test  Z11.59 Hepatitis C Screen Reflex to HCV RNA Quant and Genotype     Hepatitis C Screen Reflex to HCV RNA Quant and Genotype      11. Cervical cancer screening  Z12.4       12. CARDIOVASCULAR SCREENING; LDL GOAL LESS THAN 130  Z13.6 Lipid panel reflex to direct LDL Fasting     Comprehensive metabolic panel (BMP + Alb, Alk Phos, ALT, AST, Total. Bili, TP)     Lipid panel reflex to direct LDL Fasting     Comprehensive metabolic panel (BMP + Alb, Alk Phos, ALT, AST, Total. Bili, TP)        CPE- 49yo, establishing care.  Has been followed by ob/gyn, but now s/p hysterectomy for excessive bleeding.  Pt states cervix was removed.  Discussed diet, calcium and exercise.  Thin prep pap was not done.  Eye and dental care UTD or recommended f/u.  Flu vaccine and COVID immunizations needed today.  See orders below for tests ordered and screening needed.     KVNG- lifelong anxiety.   Sertraline worked well ~1804-1261 (stopped when it ran out during covid).  Trial of Lexapro in '22- se's of palpitations, improved after stopping.  Trial of sertraline again, but had diarrhea (was also on gerd meds then)  --Will try sertraline now, 25mg/d for 1-2 weeks, and if no diarrhea, increase to full tab.  Follow-up in ~6 weeks for VV appt.  --Will also refer to therapy.  Agree with her plan to cut back on etoh (~10 glasses wine/wk), tends to do in evenings for her anxiety.    History of GERD- was on PPI daily for ~2 yrs, but was able to wean down/off.  Now on prn tums.    Throat clearing- rec trial of flonase.    Upper back R side- rec massage, msg for PT referral if needed.    Seizure disorder- presumed, following with Dr. Cintron.  On vitamins through him, and keppra.  Adult seizures potentially vasovagal  episodes, could feel them coming on.  EEG was normal.  Does have strong fam h/o seizures, and feels better anxiety-wise on keppra, no se's.    Fam h/o breast cancer- older sister dx at age 42, and MAunt.  Follows with high risk cancer clinic, alternates MRI/mammogram q6 mo.        Patient has been advised of split billing requirements and indicates understanding: Yes      COUNSELING:  Reviewed preventive health counseling, as reflected in patient instructions        She reports that she has never smoked. She has never used smokeless tobacco.          Ana Maria Cortez MD  [facility]

## 2023-10-31 NOTE — NURSING NOTE
Prior to immunization administration, verified patients identity using patient s name and date of birth. Please see Immunization Activity for additional information.     Screening Questionnaire for Adult Immunization    Are you sick today?   No   Do you have allergies to medications, food, a vaccine component or latex?   No   Have you ever had a serious reaction after receiving a vaccination?   No   Do you have a long-term health problem with heart, lung, kidney, or metabolic disease (e.g., diabetes), asthma, a blood disorder, no spleen, complement component deficiency, a cochlear implant, or a spinal fluid leak?  Are you on long-term aspirin therapy?   No   Do you have cancer, leukemia, HIV/AIDS, or any other immune system problem?   No   Do you have a parent, brother, or sister with an immune system problem?   No   In the past 3 months, have you taken medications that affect  your immune system, such as prednisone, other steroids, or anticancer drugs; drugs for the treatment of rheumatoid arthritis, Crohn s disease, or psoriasis; or have you had radiation treatments?   No   Have you had a seizure, or a brain or other nervous system problem?   No   During the past year, have you received a transfusion of blood or blood    products, or been given immune (gamma) globulin or antiviral drug?   No   For women: Are you pregnant or is there a chance you could become       pregnant during the next month?   No   Have you received any vaccinations in the past 4 weeks?   No     Immunization questionnaire answers were all negative.      Patient instructed to remain in clinic for 15 minutes afterwards, and to report any adverse reactions.     Screening performed by Jj Bills MA on 10/31/2023 at 8:31 AM.

## 2023-11-02 NOTE — RESULT ENCOUNTER NOTE
-Your CBC labs (which includes blood labs looking for signs of infection or anemia) is normal.  -Your comprehensive metabolic panel (CMP, which includes electrolyte levels, blood sugar levels, and kidney and liver function tests) also looks great.  -Your hepatitis C screening antibody is negative.    -Your cholesterol panel looks improved from last time with a lower LDL (the bad cholesterol, down from 142 to 126) and a higher HDL (the good cholesterol, up from 65 to 78).  Both are very good changes.     Please let me know if you have any questions.  Best,   Manny Cortez MD

## 2023-12-04 ENCOUNTER — TELEPHONE (OUTPATIENT)
Dept: FAMILY MEDICINE | Facility: CLINIC | Age: 48
End: 2023-12-04
Payer: COMMERCIAL

## 2023-12-04 DIAGNOSIS — Z12.39 BREAST CANCER SCREENING, HIGH RISK PATIENT: Primary | ICD-10-CM

## 2023-12-04 NOTE — TELEPHONE ENCOUNTER
Forms/Letter Request    Type of form/letter:   Medications related to conditions    Have you been seen for this request: N/A    Do we have the form/letter:   Placed in Dr. Cortez's box    Who is the form from?   [facility]    Where did/will the form come from? form was faxed in    When is form/letter needed by: n/a    How would you like the form/letter returned:   Fax: [phone]    Patient Notified form requests are processed in 3-5 business days:No    Could we send this information to you in [employer] or would you prefer to receive a phone call?:   n/a

## 2023-12-05 PROBLEM — Z12.39 BREAST CANCER SCREENING, HIGH RISK PATIENT: Status: ACTIVE | Noted: 2023-12-05

## 2023-12-05 NOTE — TELEPHONE ENCOUNTER
Forms faxed to [facility] [place] fax # [phone] and copy sent to stat scan.     Jessica HENRIQUEZ

## 2023-12-05 NOTE — TELEPHONE ENCOUNTER
Looks like they want information on pt's tamoxifem use.   Form should have been sent to high risk cancer clinic, but will fill out.  Reviewed chart.  She was on tamoxifem from 4/19-9/19 for higher risk of developing breast cancer due to fam h/o.  Genetic testing in 1/19 neg.  1/23 mammo neg  7/23 breast MRI neg.    Will bring form to TC's to fax.  Thanks- CW

## 2024-01-30 ENCOUNTER — ANCILLARY PROCEDURE (OUTPATIENT)
Dept: MAMMOGRAPHY | Facility: CLINIC | Age: 49
End: 2024-01-30
Attending: CLINICAL NURSE SPECIALIST
Payer: COMMERCIAL

## 2024-01-30 DIAGNOSIS — Z80.3 FAMILY HISTORY OF MALIGNANT NEOPLASM OF BREAST: ICD-10-CM

## 2024-01-30 DIAGNOSIS — R92.30 DENSE BREAST: ICD-10-CM

## 2024-01-30 DIAGNOSIS — Z12.39 BREAST CANCER SCREENING, HIGH RISK PATIENT: ICD-10-CM

## 2024-01-30 PROCEDURE — 77067 SCR MAMMO BI INCL CAD: CPT | Mod: GC | Performed by: STUDENT IN AN ORGANIZED HEALTH CARE EDUCATION/TRAINING PROGRAM

## 2024-01-30 PROCEDURE — 77063 BREAST TOMOSYNTHESIS BI: CPT | Mod: GC | Performed by: STUDENT IN AN ORGANIZED HEALTH CARE EDUCATION/TRAINING PROGRAM

## 2024-02-02 ENCOUNTER — ANCILLARY PROCEDURE (OUTPATIENT)
Dept: MAMMOGRAPHY | Facility: CLINIC | Age: 49
End: 2024-02-02
Attending: CLINICAL NURSE SPECIALIST
Payer: COMMERCIAL

## 2024-02-02 DIAGNOSIS — R92.8 ABNORMAL MAMMOGRAM OF LEFT BREAST: ICD-10-CM

## 2024-02-02 PROCEDURE — 19081 BX BREAST 1ST LESION STRTCTC: CPT | Mod: LT | Performed by: RADIOLOGY

## 2024-02-02 PROCEDURE — 88305 TISSUE EXAM BY PATHOLOGIST: CPT | Mod: 26 | Performed by: PATHOLOGY

## 2024-02-02 PROCEDURE — G0279 TOMOSYNTHESIS, MAMMO: HCPCS | Performed by: RADIOLOGY

## 2024-02-02 PROCEDURE — 88342 IMHCHEM/IMCYTCHM 1ST ANTB: CPT | Mod: 26 | Performed by: PATHOLOGY

## 2024-02-02 PROCEDURE — 88305 TISSUE EXAM BY PATHOLOGIST: CPT | Mod: TC | Performed by: CLINICAL NURSE SPECIALIST

## 2024-02-02 PROCEDURE — 77066 DX MAMMO INCL CAD BI: CPT | Performed by: RADIOLOGY

## 2024-02-02 RX ORDER — LIDOCAINE HYDROCHLORIDE AND EPINEPHRINE 10; 10 MG/ML; UG/ML
10 INJECTION, SOLUTION INFILTRATION; PERINEURAL ONCE
Status: COMPLETED | OUTPATIENT
Start: 2024-02-02 | End: 2024-02-02

## 2024-02-02 RX ORDER — IOPAMIDOL 755 MG/ML
100 INJECTION, SOLUTION INTRAVASCULAR ONCE
Status: COMPLETED | OUTPATIENT
Start: 2024-02-02 | End: 2024-02-02

## 2024-02-02 RX ADMIN — LIDOCAINE HYDROCHLORIDE AND EPINEPHRINE 10 ML: 10; 10 INJECTION, SOLUTION INFILTRATION; PERINEURAL at 10:08

## 2024-02-02 RX ADMIN — IOPAMIDOL 100 ML: 755 INJECTION, SOLUTION INTRAVASCULAR at 09:47

## 2024-02-07 ENCOUNTER — PATIENT OUTREACH (OUTPATIENT)
Dept: ONCOLOGY | Facility: CLINIC | Age: 49
End: 2024-02-07
Payer: COMMERCIAL

## 2024-02-07 ENCOUNTER — TELEPHONE (OUTPATIENT)
Dept: MAMMOGRAPHY | Facility: CLINIC | Age: 49
End: 2024-02-07
Payer: COMMERCIAL

## 2024-02-07 DIAGNOSIS — N60.82 FLAT EPITHELIAL ATYPIA (FEA) OF LEFT BREAST: ICD-10-CM

## 2024-02-07 DIAGNOSIS — N60.92 ATYPICAL LOBULAR HYPERPLASIA (ALH) OF LEFT BREAST: Primary | ICD-10-CM

## 2024-02-07 LAB
PATH REPORT.COMMENTS IMP SPEC: NORMAL
PATH REPORT.COMMENTS IMP SPEC: NORMAL
PATH REPORT.FINAL DX SPEC: NORMAL
PATH REPORT.GROSS SPEC: NORMAL
PATH REPORT.MICROSCOPIC SPEC OTHER STN: NORMAL
PATH REPORT.RELEVANT HX SPEC: NORMAL
PHOTO IMAGE: NORMAL

## 2024-02-07 NOTE — PROGRESS NOTES
New Patient Oncology Nurse Navigator Note     Referring provider: Imani CURRIE CNS      Referring Clinic/Organization:     [facility]      Referred to (specialty:) Cancer Surgery     Requested provider (if applicable): Dr. Lewis      Date Referral Received: February 7, 2024     Evaluation for:    N60.92 (ICD-10-CM) - Atypical lobular hyperplasia (ALH) of left breast   N60.82 (ICD-10-CM) - Flat epithelial atypia (FEA) of left breast        Clinical History (per Nurse review of records provided):      BILATERAL FULL FIELD DIGITAL SCREENING MAMMOGRAM WITH TOMOSYNTHESIS     Performed on: 1/30/24     Compared to: 07/18/2023, 01/24/2023, 01/18/2022, and 01/07/2021     Technique:  This study was evaluated with the assistance of  Computer-Aided Detection.  Breast Tomosynthesis was used in  interpretation.     Findings: The breasts are heterogeneously dense, which may obscure  small masses.     There are possible calcifications in the left breast at the 3 o'clock  position, middle depth.     There is no suspicious finding of the right breast.                                                                                                                                    IMPRESSION: BI-RADS CATEGORY: 0 - Incomplete - Need Additional Imaging     RECOMMENDED FOLLOW-UP: Diagnostic Mammogram and Ultrasound.     Diagnostic left breast mammogram and possible left breast ultrasound.     The results and recommendations of this examination will be  communicated to the patient and the imaging center will attempt to  contact the patient within 2-3 business days to schedule follow-up  imaging.     Based on the pre-exam history questionnaire and medical history, there  may be increased risk for developing breast cancer or other cancers in  the future. The contact for scheduling cancer genetic counseling for  risk assessment and possible genetic testing  and supplemental  screening is: [phone].     I have personally reviewed the examination and initial interpretation  and I agree with the findings.     LAUREN SALAZAR MD     EXAMINATION: MA DIAGNOSTIC DIGITAL LEFT, MA CONTRAST ENHANCED DIGITAL  MAMMOGRAM W KELLY, 2/2/2024 9:05 AM      HISTORY/FAMILY HISTORY: High risk patient with developed  calcifications left breast on screening mammogram     COMPARISON: 1/30/2024 1/24/2023 1/18/2020 and multiple prior MRIs.     TECHNIQUE: Following the administration of intravenous contrast,  dual-energy mammography of both breasts is performed.  Contrast: 120  mL Isovue-370.     FINDINGS:   BREAST DENSITY: Heterogeneously dense.     Magnification views demonstrates 1.2 cm grouping of amorphous  calcifications in the upper outer left breast.      Bilateral contrast-enhanced tomography was performed. No suspicious  enhancement in either breast.                                                                       IMPRESSION: BI-RADS CATEGORY: 4 - Suspicious.     RECOMMENDED FOLLOW-UP: Biopsy.     Stereotactic biopsy of the left breast follow.    Final Diagnosis   LEFT breast, 3:00, calcifications, stereotactic core biopsy:  -Atypical lobular hyperplasia (ALH)  -Flat epithelial atypia (FEA)  -Other findings: Fibrocystic change (including microcysts) and columnar cell change  -Luminal calcifications in atypical and non-atypical acini  -Negative for malignancy   Electronically signed by Victoria Vidal MD on 2/7/2024 at 10:16 AM   Clinical Information  UUMADUANE   The patient is a 48-year-old woman with a family history of breast cancer. Screening mammogram showed LEFT breast calcifications, and subsequent diagnostic mammogram showed a 1.2 cm group of amorphous LEFT breast calcifications at 3:00, middle depth. Contrast enhanced mammogram shows no suspicious enhancement.  Procedure: LEFT breast stereotactic core biopsy (biopsy marker placed).    Gross Description  OLENA  "  A(1). Breast, Left, Left breast, 3oclock, calcifications:  The specimen is received in formalin with proper patient identification, labeled \"left breast, 3:00, calcifications\".  It includes a 2.2 x 1.5 x 0.2 cm aggregate of yellow-tan fibroadipose tissue, which is received in a separate cassette.  Also in the specimen container is a 2.0 x 1.2 x 0.2 cm aggregate of yellow-tan fibroadipose tissue.  The specimen is entirely submitted.     A1- tissue cores  A2-remainder of specimen               The specimen is collected and placed in formalin at 10:54 AM on 02/02/24.   Microscopic Description  UUMAYO   Microscopic examination is performed. An E-cadherin immunostain (block A1, examined with appropriate positive control) shows diminished membranous staining in ALH, supporting the diagnosis.   I have personally reviewed all specimens and or slides, including the listed special stains, and used them with my medical judgement to determine the final diagnosis.         Records Location: See Bookmarked material     Records Needed: NA     Additional testing needed prior to consult: NA    Payor: the Shelf / Plan: the Shelf COMMERCIAL / Product Type: HMO /     February 7, 2024    Called patient to introduced myself and role as nurse navigator with [facility] Hematology/Oncology department and to inform them that we have received the referral for a diagnosis of ALH from Imani HOWELL. Patient confirms they are aware of the referral and ready to schedule.  Provided them with contact information for NPS and encourage them to call with any questions. Patient verbalized understanding of plan. Transferred to NPS to schedule.     Regina Hunt, RN, BSN  [facility] Hematology/Oncology Nurse Navigator  [phone]    "

## 2024-02-07 NOTE — TELEPHONE ENCOUNTER
Spoke to Brittney about the finding of Atypical Lobular Hyperplasia and Flat Epithelial Atypia found in her left breast.  We discussed with Radiologist's recommendation of surgical consultation.  A referral has been placed and someone will be reaching out to help coordinate the appointment.  Brittney verbalized understanding and all questions and concerns were answered at this time.

## 2024-02-14 NOTE — PROGRESS NOTES
[facility] Breast Center Note    CHIEF COMPLAINT:  Atypical lobular hyperplasia, FEA    HISTORY OF PRESENT ILLNESS:  Brittney Morris is a 48 year old female who is seen for newly diagnosed left breast atypical lobular hyperplasia and FEA.     She is well known to me as I had seen Brittney in 2018 for a radial scar on her right breast for which we did a tag localized excisional biopsy. Her final pathology was benign. Her lifetime risk for breast cancer is 29% using the Bulmaro model and a 42.9% using the JACOB risk calculator. and she has followed since with KUSH Ruiz with our high risk clinic. She had tried tamoxifen but did not tolerate well due to abnormal vaginal bleeding. She has since had a hysterectomy with Dr. Walton and surgery went very well.     She had a screening mammogram on 1/30/2024 which revealed a 1cm span of microcalcifications in the lateral left breast. She had stereotactic biopsy of this which revealed ALH and FEA. She also had a biopsy on the right breast in 2021 which revealed a papilloma.     Hormonal history:  Pre menopausal, 5 years OCP use, no HRT, no fertility treatment.  2 children, 1st at age 38, menarche 12. She  for 2 months per child. .     Family history of breast cancer: Yes - sister at 42 and maternal aunt in her 50s.   Family history of ovarian cancer:  No  Family history of colon cancer: No  Family history of prostate cancer: No      Past Medical History:   Diagnosis Date    Anxiety     Missed ab     Seizures (H) 1985    Seizure as a child. Took meds for 2 years.    UTI (urinary tract infection)        Past Surgical History:   Procedure Laterality Date    BIOPSY BREAST SEED LOCALIZATION Right 8/22/2018    Procedure: BIOPSY BREAST SEED LOCALIZATION;  RIGHT SEED LOCALIZED BREAST BIOPSY ;  Surgeon: Naomi Lewis MD;  Location: [facility]    DILATION AND CURETTAGE SUCTION  12/3/2012    Procedure: DILATION AND CURETTAGE SUCTION;  SUCTION DILATION AND CURETTAGE;   Surgeon: Nargis Gordon MD;  [facility]dom teeth[         Family History   Problem Relation Age of Onset    Hypertension Mother     Breast Cancer Sister 42    Breast Cancer Maternal Aunt 50    Esophageal Cancer Maternal Uncle 70       Social History     Tobacco Use    Smoking status: Never    Smokeless tobacco: Never   Substance Use Topics    Alcohol use: Yes     Comment: occas       Patient Active Problem List   Diagnosis    Vacuum extractor delivery, delivered    Pain in joint involving pelvic region and thigh    Female stress incontinence    Syncope    Dysplastic nevi    Gastroesophageal reflux disease without esophagitis    Leiomyoma of uterus    Neoplasm of uncertain behavior of skin    Vitamin deficiency    KVNG (generalized anxiety disorder)    Hemorrhoid    Hyperlipidemia    Seizure disorder (H)    Conjunctival cyst of both eyes    History of dysplastic nevus    Myopia of both eyes with astigmatism and presbyopia    Superficial punctate keratitis, left    History of heartburn    Breast cancer screening, high risk patient     No Known Allergies  Current Outpatient Medications   Medication Sig Dispense Refill    cholecalciferol 25 MCG (1000 UT) TABS Take 1,000 Units by mouth      levETIRAcetam (KEPPRA) 500 MG tablet TAKE 1 TABLET BY MOUTH TWICE A DAY      magnesium 250 MG tablet Take 1 tablet by mouth daily      sertraline (ZOLOFT) 50 MG tablet Take 1 tablet (50 mg) by mouth daily (Patient not taking: Reported on 1/30/2024) 90 tablet 0    vitamin C (ASCORBIC ACID) 500 MG tablet Take 500 mg by mouth (Patient not taking: Reported on 10/31/2023)       Vitals: LMP 08/01/2018 (Approximate)   BMI= There is no height or weight on file to calculate BMI.    EXAM:  GENERAL: healthy, alert and no distress   BREAST: well healed scar on the right upper outer breast. Brusing on the lateral left breast at recent biopsy site. 2cm hematoma palpable in the breast at the biopsy site. No other  masses. Skin otherwise normal. NAC normal.   There is no axillary or supraclavicular lymphadenopathy.  CARDIOVASCULAR:  RRR  RESPIRATORY: nonlabored breathing  NECK: Neck supple. No adenopathy. Thyroid symmetric, normal size  SKIN: No suspicious lesions or rashes  LYMPH: Normal cervical lymph nodes      ASSESSMENT/PLAN:  Brittney Morris is a 48yof with increased lifetime risk of breast cancer as above and newly diagnosed left breast ALH/FEA. We discussed that ALH is a marker in the breast tissue of increased future lifetime risk of breast cancer in either breast and also discussed FEA. We reviewed her imaging together and I believe it is concordant as well. This is a small span of microcalcifications which was largely removed with the core needle biopsy. We then discussed options going forward including continuing with high risk screening, restarting tamoxifen with high risk screening or considering prophylactic mastectomies. At this time she is leaning toward prophylactic mastectomies given the number  of biopsies she has now had along with her family history. I would recommend this as well. She is interested in reconstruction. We discussed the types of mastectomy including simple vs skin sparing vs nipple sparing. She is wearing a C cup typically. We discussed the small risk of nipple necrosis with nipple sparing option and no sensation is typical. We will help her schedule with plastic surgery. She does want to be smaller and as such [facility] may be a better option but she  will discuss with plastics on that regard. She is interested in surgery in the fall which is reasonable. Plan for MRI in July to continue high risk screening. I would like to see her after imaging to finalize surgery plan. It would be reasonable  to have her start tamoxifen for risk reduction in the meantime. She is open to this. I will route to Imani Gallagher to touch base with her.           Naomi Lewis MD  [facility]  [phone]        Please route or send letter to:  Primary Care Provider (PCP) and Referring Provider

## 2024-02-15 ENCOUNTER — OFFICE VISIT (OUTPATIENT)
Dept: SURGERY | Facility: CLINIC | Age: 49
End: 2024-02-15
Attending: CLINICAL NURSE SPECIALIST
Payer: COMMERCIAL

## 2024-02-15 VITALS
HEIGHT: 62 IN | HEART RATE: 76 BPM | BODY MASS INDEX: 24.95 KG/M2 | DIASTOLIC BLOOD PRESSURE: 88 MMHG | OXYGEN SATURATION: 100 % | WEIGHT: 135.6 LBS | SYSTOLIC BLOOD PRESSURE: 128 MMHG

## 2024-02-15 DIAGNOSIS — Z12.39 BREAST CANCER SCREENING, HIGH RISK PATIENT: Primary | ICD-10-CM

## 2024-02-15 DIAGNOSIS — N60.92 ATYPICAL LOBULAR HYPERPLASIA (ALH) OF LEFT BREAST: ICD-10-CM

## 2024-02-15 DIAGNOSIS — N60.82 FLAT EPITHELIAL ATYPIA (FEA) OF LEFT BREAST: ICD-10-CM

## 2024-02-15 PROCEDURE — 99204 OFFICE O/P NEW MOD 45 MIN: CPT | Performed by: SURGERY

## 2024-02-15 NOTE — LETTER
2/15/2024         RE: Brittney Morris  [address] [facility] [address]        Dear Colleague,    Thank you for referring your patient, Brittney Morris, to the [facility]. Please see a copy of my visit note below.    [facility] Breast Center Note    CHIEF COMPLAINT:  Atypical lobular hyperplasia, FEA    HISTORY OF PRESENT ILLNESS:  Brittney Morris is a 48 year old female who is seen for newly diagnosed left breast atypical lobular hyperplasia and FEA.     She is well known to me as I had seen Brittney in 2018 for a radial scar on her right breast for which we did a tag localized excisional biopsy. Her final pathology was benign. Her lifetime risk for breast cancer is 29% using the Bulmaro model and a 42.9% using the JACOB risk calculator. and she has followed since with KUSH Ruiz with our high risk clinic. She had tried tamoxifen but did not tolerate well due to abnormal vaginal bleeding. She has since had a hysterectomy with Dr. Walton and surgery went very well.     She had a screening mammogram on 1/30/2024 which revealed a 1cm span of microcalcifications in the lateral left breast. She had stereotactic biopsy of this which revealed ALH and FEA. She also had a biopsy on the right breast in 2021 which revealed a papilloma.     Hormonal history:  Pre menopausal, 5 years OCP use, no HRT, no fertility treatment.  2 children, 1st at age 38, menarche 12. She  for 2 months per child. .     Family history of breast cancer: Yes - sister at 42 and maternal aunt in her 50s.   Family history of ovarian cancer:  No  Family history of colon cancer: No  Family history of prostate cancer: No      Past Medical History:   Diagnosis Date    Anxiety     Missed ab     Seizures (H) 1985    Seizure as a child. Took meds for 2 years.    UTI (urinary tract infection)        Past Surgical History:   Procedure Laterality Date    BIOPSY BREAST SEED LOCALIZATION  Right 8/22/2018    Procedure: BIOPSY BREAST SEED LOCALIZATION;  RIGHT SEED LOCALIZED BREAST BIOPSY ;  Surgeon: Naomi Lewis MD;  Location: [facility]    DILATION AND CURETTAGE SUCTION  12/3/2012    Procedure: DILATION AND CURETTAGE SUCTION;  SUCTION DILATION AND CURETTAGE;  Surgeon: Nargis Gordon MD;  [facility]         Family History   Problem Relation Age of Onset    Hypertension Mother     Breast Cancer Sister 42    Breast Cancer Maternal Aunt 50    Esophageal Cancer Maternal Uncle 70       Social History     Tobacco Use    Smoking status: Never    Smokeless tobacco: Never   Substance Use Topics    Alcohol use: Yes     Comment: occas       Patient Active Problem List   Diagnosis    Vacuum extractor delivery, delivered    Pain in joint involving pelvic region and thigh    Female stress incontinence    Syncope    Dysplastic nevi    Gastroesophageal reflux disease without esophagitis    Leiomyoma of uterus    Neoplasm of uncertain behavior of skin    Vitamin deficiency    KVNG (generalized anxiety disorder)    Hemorrhoid    Hyperlipidemia    Seizure disorder (H)    Conjunctival cyst of both eyes    History of dysplastic nevus    Myopia of both eyes with astigmatism and presbyopia    Superficial punctate keratitis, left    History of heartburn    Breast cancer screening, high risk patient     No Known Allergies  Current Outpatient Medications   Medication Sig Dispense Refill    cholecalciferol 25 MCG (1000 UT) TABS Take 1,000 Units by mouth      levETIRAcetam (KEPPRA) 500 MG tablet TAKE 1 TABLET BY MOUTH TWICE A DAY      magnesium 250 MG tablet Take 1 tablet by mouth daily      sertraline (ZOLOFT) 50 MG tablet Take 1 tablet (50 mg) by mouth daily (Patient not taking: Reported on 1/30/2024) 90 tablet 0    vitamin C (ASCORBIC ACID) 500 MG tablet Take 500 mg by mouth (Patient not taking: Reported on 10/31/2023)       Vitals: LMP 08/01/2018 (Approximate)   BMI= There is no height  or weight on file to calculate BMI.    EXAM:  GENERAL: healthy, alert and no distress   BREAST: well healed scar on the right upper outer breast. Brusing on the lateral left breast at recent biopsy site. 2cm hematoma palpable in the breast at the biopsy site. No other masses. Skin otherwise normal. NAC normal.   There is no axillary or supraclavicular lymphadenopathy.  CARDIOVASCULAR:  RRR  RESPIRATORY: nonlabored breathing  NECK: Neck supple. No adenopathy. Thyroid symmetric, normal size  SKIN: No suspicious lesions or rashes  LYMPH: Normal cervical lymph nodes      ASSESSMENT/PLAN:  Brittney Morris is a 48yof with increased lifetime risk of breast cancer as above and newly diagnosed left breast ALH/FEA. We discussed that ALH is a marker in the breast tissue of increased future lifetime risk of breast cancer in either breast and also discussed FEA. We reviewed her imaging together and I believe it is concordant as well. This is a small span of microcalcifications which was largely removed with the core needle biopsy. We then discussed options going forward including continuing with high risk screening, restarting tamoxifen with high risk screening or considering prophylactic mastectomies. At this time she is leaning toward prophylactic mastectomies given the number  of biopsies she has now had along with her family history. I would recommend this as well. She is interested in reconstruction. We discussed the types of mastectomy including simple vs skin sparing vs nipple sparing. She is wearing a C cup typically. We discussed the small risk of nipple necrosis with nipple sparing option and no sensation is typical. We will help her schedule with plastic surgery. She does want to be smaller and as such [facility] may be a better option but she  will discuss with plastics on that regard. She is interested in surgery in the fall which is reasonable. Plan for MRI in July to continue high risk screening. I would like to see  her after imaging to finalize surgery plan. It would be reasonable  to have her start tamoxifen for risk reduction in the meantime. She is open to this. I will route to Imani Gallagher to touch base with her.           Naomi Lewis MD  [facility]  [phone]        Please route or send letter to:  Primary Care Provider (PCP) and Referring Provider

## 2024-02-15 NOTE — PATIENT INSTRUCTIONS
You are scheduled with Dr. Darci Son at [facility] on 03/12/24 at 10:00 AM.     Schedule MR Breast Bilateral w/o & w Contrast in 5 months (July 2024)  Schedule follow-up appointment with Dr. Lewis after MRI

## 2024-02-15 NOTE — NURSING NOTE
"Brittney Morris's goals for this visit include:   Chief Complaint   Patient presents with    Consult     Atypical lobular hyperplasia (ALH) of left breast, Flat epithelial atypia (FEA) of left breast       She requests these members of her care team be copied on today's visit information:     PCP: Ana Maria Cortez    Referring Provider:  KUSH Nix CNS  [address] [place] [address]    /88 (BP Location: Right arm, Patient Position: Sitting, Cuff Size: Adult Regular)   Pulse 76   Ht 1.583 m (5' 2.32\")   Wt 61.5 kg (135 lb 9.6 oz)   LMP 08/01/2018 (Approximate)   SpO2 100%   BMI 24.55 kg/m      Do you need any medication refills at today's visit? No  Jen Brandt CMA      "

## 2024-02-16 ENCOUNTER — TELEPHONE (OUTPATIENT)
Dept: SURGERY | Facility: CLINIC | Age: 49
End: 2024-02-16
Payer: COMMERCIAL

## 2024-02-16 NOTE — TELEPHONE ENCOUNTER
Left Voicemail (1st Attempt) for the patient to call back and schedule the following:    Appointment type: return  Provider: dr. jenkins  Return date: 7/15/2024  Specialty phone number: [phone]   Additional appointment(s) needed: mri prior to follow up   Additonal Notes: Return in about 5 months (around 7/15/2024)     [facility]   Orthopedics, Podiatry, Sports Medicine, Ent ,Eye , Audiology, Adult Endocrine & Diabetes, Nutrition & Medication Therapy Management Specialties   [facility]

## 2024-02-20 NOTE — PROGRESS NOTES
Oncology Risk Management Consultation:  Date on this visit: 2/21/2024    Brittney Morris  returns to the clinic today for a follow up appointment. She requires high risk screening and surveillance to reduce her risk of cancer secondary to having a history of atypical lobular hyperplasia (ALH). She is considered to be at high risk for breast cancer, with a risk of up to 35%.    Primary Physician: Johana Agrawal MD     History Of Present Illness:  Ms. Morris is a very pleasant, healthy 48 year old female who presents with a history of atypical lobular hyperplasia.    Pertinent history:  Menarche at 12.  First child at 38.  12/3/2012 - Partial molar pregnancy.  Premenopausal.  Ovaries intact  1/22/2021-  Uterus, cervix, and bilateral fallopian tubes removed for bleeding: FINAL DIAGNOSIS:   Uterus, cervix, bilateral fallopian tubes, total hysterectomy and  bilateral salpingectomy   - Cervix: No significant abnormality   - Endometrium: Gland and stromal changes consistent with exogenous hormone effect   - Myometrium: Leiomyoma   - Fallopian tubes, bilateral: No significant abnormality   Breast Density: heterogeneously dense.  Hx of OCP use x3-4 years.  Vasectomy for birth control.  No hx of HRT.  6/7/2018: Right breast core biopsies x2:  Site A: 1230 position, 2 cm from nipple measuring 0.6 cm biopsied.  An X shaped clip was placed. Site B; 0.6 cm ill-defined hypoechoic lesion at 12:00, 4 cm from the nipple was biopsied -Q shaped clip was placed. Percutaneous core needle biopsy, right:  A.  Benign intraductal papilloma B: Fragments of a radial scar (complex sclerosing lesion (with associated calcifications.  There is no evidence of invasive malignancy on this biopsy specimen.  Imaging reviewed with Dr. Santiago and Dr. Parisi who agreed the pathologic findings are concordant with radiologic findings.     8/22/2018 - Excisional biopsy, right breast: no evidence of residual radial scar, fibrocystic changes with  usual ductal hyperplasia and microcalcifications. Negative for in situ or invasive carcinoma.  No hx of atypia or malignancy.   2021- Right stereotactic core biopsy, Pathology results:  RIGHT BREAST, 9:00, MID DEPTH, STEREOTACTIC CORE BIOPSY:   - Fibrocystic change (including apocrine metaplasia and microcysts),   columnar cell change, adenomyoepithelial   adenosis (focal), and usual duct hyperplasia (UDH).   - Benign intraductal papilloma (2 mm).   - Calcifications associated with benign acini.   - Negative for atypia or malignancy.   History of low dose tamoxifen from April-2019; stopped due to dizziness, side effects.    2024- LEFT breast, 3:00, calcifications, stereotactic core biopsy:  -Atypical lobular hyperplasia (ALH)  -Flat epithelial atypia (FEA)  -Other findings: Fibrocystic change (including microcysts) and columnar cell change  -Luminal calcifications in atypical and non-atypical acini  -Negative for malignancy     Genetic testin2019 NEGATIVE for mutations in the MARIIA, BRCA1, BRCA2, BRIP1, CDH1, CHEK2, EPCAM, MLH1, MSH2, MSH6, NBN, NF1, PALB2, PMS2, PTEN, RAD51C, RAD51D, STK11, and TP53 genes using a Custom Next-Cancer (19 genes, a combination of Gyn Plus and BRCA plus + NBN, STK11, and NF1) panel  from [employer].  No mutations were found in any of the 19 genes analyzed. This test involved sequencing and deletion/duplication analysis of all genes with the exception of EPCAM (deletions/duplications only). This testing was done because of her family history of her sister's breast cancer diagnosed at age 42, and her maternal aunt's breast cancer diagnosed in her 50's.     Pertinent screening history:  2014 right breast ultrasound for palpable right axillary lump. Patient is currently breast-feeding.  BI-RADS Category 1.     2015: Baseline screening mammogram, BI-RADS 1.   10/11/2016: Screening Tomosynthesis mammogram, BI-RADS 1  .10/12/2017: Screening Tomosynthesis  mammogram, BI-RADS 1.   6/6/2018 -- Digital mammogram diagnostic unilateral tomosynthesis and Ultrasound of the right medial breast:  An oval hypoechoic solid-appearing mass is present at the 12 o'clock position 4 cm from the nipple measuring 0.6 cm.  A round hypoechoic mass with through transmission is present at the 1230 position, 2 cm from the nipple measuring 0.6 cm which may represent a complicated cyst.  Scattered tiny benign cysts are noted in the medial right breast. BI-RADS category: 4 suspicious -ultrasound-guided biopsy recommended.  12/5/2018- Screening tomosynthesis mammogram, BiRads2  5/6/2019- Breast MRI, BiRads2  12/12/2019- Screening tomosynthesis mammogram, BiRads1  1/7/2021- Screening tomosynthesis mammogram, BiRads0 for Post biopsy changes right breast. Possible developing  calcifications mid depth right breast just lateral to central core mid  depth. (CC josafat 30/72). No significant change left breast.  1/11/2021- Diagnostic tomosynthesis mammogram, BiRads4 for group of amorphous calcifications in the slightly upper slightly outer right breast at mid depth posterior to the site of prior excision, for which additional evaluation with stereotactic guided biopsy is indicated.  1/19/2021- Biopsy:  2 mm benign papilloma.   7/2/2021- Breast MRI and diagnostic tomosynthesis mammogram of right breast, both BiRads1  1/18/2022- Screening tomosynthesis mammogram, BiRads1  7/8/2022- Breast MRI, BiRads2  1/24/2023- Screening tomosynthesis mammogram, BiRads1  7/18/2023- Breast MRI, BiRads1  1/30/2024- Screening tomosynthesis mammogram, BiRads0- There are possible calcifications in the left breast at the 3 o'clock position, middle depth.  2/2/2024: Diagnostic mammogram- Magnification views demonstrates 1.2 cm grouping of amorphous  calcifications in the upper outer left breast. Bilateral contrast-enhanced tomography was performed. No suspicious enhancement in either breast. BiRads4. Left breast mammogram guided  biopsy. Path: LEFT breast, 3:00, calcifications, stereotactic core biopsy: Atypical lobular hyperplasia (ALH). Flat epithelial atypia (FEA). Other findings: Fibrocystic change (including microcysts) and columnar cell change. Luminal calcifications in atypical and non-atypical acini. Negative for malignancy    At this visit, she denies new fatigue, breast asymmetry, lumps, masses, thickening, pain, nipple discharge and skin changes.      Past Medical/Surgical History:  Past Medical History:   Diagnosis Date    Anxiety     Missed ab     Seizures (H) 1985    Seizure as a child. Took meds for 2 years.    UTI (urinary tract infection)      Past Surgical History:   Procedure Laterality Date    BIOPSY BREAST SEED LOCALIZATION Right 8/22/2018    Procedure: BIOPSY BREAST SEED LOCALIZATION;  RIGHT SEED LOCALIZED BREAST BIOPSY ;  Surgeon: Naomi Lewis MD;  Location: [facility]    DILATION AND CURETTAGE SUCTION  12/3/2012    Procedure: DILATION AND CURETTAGE SUCTION;  SUCTION DILATION AND CURETTAGE;  Surgeon: Nargis Gordon MD;  Location: [facility]    wisdom teeth[         Allergies:  Allergies as of 02/21/2024    (No Known Allergies)       Current Medications:  Current Outpatient Medications   Medication Sig Dispense Refill    cholecalciferol 25 MCG (1000 UT) TABS Take 1,000 Units by mouth      levETIRAcetam (KEPPRA) 500 MG tablet TAKE 1 TABLET BY MOUTH TWICE A DAY      vitamin C (ASCORBIC ACID) 500 MG tablet Take 500 mg by mouth (Patient not taking: Reported on 10/31/2023)          Family History:  Family History   Problem Relation Age of Onset    Hypertension Mother     Breast Cancer Sister 42    Breast Cancer Maternal Aunt 50    Esophageal Cancer Maternal Uncle 70       Social History:  Social History     Socioeconomic History    Marital status:      Spouse name: Jamison    Number of children: 2    Years of education: Not on file    Highest education level: Not on file   Occupational History     Employer:  [employer]   Tobacco Use    Smoking status: Never    Smokeless tobacco: Never   Substance and Sexual Activity    Alcohol use: Yes     Comment: occas    Drug use: No    Sexual activity: Yes     Partners: Male     Birth control/protection: Male Surgical, Female Surgical   Other Topics Concern    Parent/sibling w/ CABG, MI or angioplasty before 65F 55M? Not Asked   Social History Narrative    Not on file     Social Determinants of Health     Financial Resource Strain: Low Risk  (10/31/2023)    Financial Resource Strain     Within the past 12 months, have you or your family members you live with been unable to get utilities (heat, electricity) when it was really needed?: No   Food Insecurity: Low Risk  (10/31/2023)    Food Insecurity     Within the past 12 months, did you worry that your food would run out before you got money to buy more?: No     Within the past 12 months, did the food you bought just not last and you didn t have money to get more?: No   Transportation Needs: Low Risk  (10/31/2023)    Transportation Needs     Within the past 12 months, has lack of transportation kept you from medical appointments, getting your medicines, non-medical meetings or appointments, work, or from getting things that you need?: No   Physical Activity: Not on file   Stress: Not on file   Social Connections: Not on file   Interpersonal Safety: Low Risk  (10/31/2023)    Interpersonal Safety     Do you feel physically and emotionally safe where you currently live?: Yes     Within the past 12 months, have you been hit, slapped, kicked or otherwise physically hurt by someone?: No     Within the past 12 months, have you been humiliated or emotionally abused in other ways by your partner or ex-partner?: No   Housing Stability: Low Risk  (10/31/2023)    Housing Stability     Do you have housing? : Yes     Are you worried about losing your housing?: No       Physical Exam:  /88   Ht 1.583 m (5' 2.32\")   Wt 61.2 kg (135 lb)  "  LMP 08/01/2018 (Approximate)   BMI 24.44 kg/m    GENERAL: alert and no distress  EYES: Eyes grossly normal to inspection.  No discharge or erythema, or obvious scleral/conjunctival abnormalities.  RESP: No audible wheeze, cough, or visible cyanosis.    SKIN: Visible skin clear. No significant rash, abnormal pigmentation or lesions.  NEURO: Cranial nerves grossly intact.  Mentation and speech appropriate for age.  PSYCH: Appropriate affect, tone, and pace of words      Laboratory/Imaging Studies  No results found for any visits on 02/21/24.    ASSESSMENT:    We discussed Brittney's recent breast imaging and new diagnosis of ALH. She is planning a prophylactic mastectomy this fall with Dr. Lewis. We will still plan for MRI in July to continue high risk screening (already ordered by Dr. Lewis). Today she is here to discuss resuming tamoxifen to reduce her risk of breast cancer prior to surgery. She was previously on this medication, however she developed heavy menstrual bleeding. She has since had a hysterectomy. She is willing to try the medication again. We discussed risks/benefits of different doses. We will proceed with the 20mg dose at this time. If Brittney has side effects at this dose, we discussed that she can cut down to 10mg daily. The prescription was sent to her pharmacy in [place].     Tamoxifen has been approved in the [place] to help lower the risk of breast cancer. It is in the class of medications called selective estrogen receptor modulators (or SERMs) and  acts against (or block) estrogen  in some tissues of the body, but act like estrogen in others. Estrogen can fuel the growth of breast cancer cells. It also block estrogen in breast cells, which is why it can be useful in lowering the risk of breast cancer. Tamoxifen at  20 mg per day for 5 years was shown to reduce risk of breast cancer by 49%, even up to 10 years after the therapy is complete.   Side effects may include hot flashes and other  menopausal symptoms in some women, which typically resolve after th course of treatment is finished. Adverse effects occur in 1% of women: blood clots or uterine hyperplasia, some elevation of cholesterol and increased risk for cataracts.      Individualized Surveillance Plan for women  With 20% or greater lifetime risk of breast cancer   Per NCCN Breast Cancer Screening and Diagnosis Guidelines Version 1.2023   Recommended screening Test or procedure Last done Next Scheduled    Clinical encounter Clinical exam every 6-12 months.   Refer to genetic counseling if not already done.  Consider risk reduction strategies.   Feb 2024 Feb 2025   However, some family histories with breast cancers at a very young age, may warrant screening starting earlier.    *May begin at age 40 if breast cancers in the family occur at later ages.    Annual mammogram beginning 10 years younger than the earliest breast cancer in the family but not prior to age 30.    Recommend annual breast MRI to begin 10 years younger than the earliest breast cancer in the family but not prior to age 25.    Breast MRIs are preferably done on day 7-15 of the menstrual cycle in premenopausal women.     NA     NA   Breast screening for patients at high risk due to thoracic radiation between the ages of 10-30   Annual clinical exam beginning 8 years after radiation therapy.    Annual screening mammogram beginning at age 30 or 8 years after radiation therapy    Annual breast MRI, beginning at age 25 or 8 years after radiation therapy.     NA   NA   Women who have a lifetime risk of >20% based on history of LCIS or ADH/ALH Annual screening mammogram beginning at age of LCIS or ADH/ALH but not prior to age 30.    Consider annual MRI to begin at age of diagnosis of LCIS or ADH/ALH but not prior to age 25.    Recommend risk reducing strategies if possible.   7/18/2023: Breast MRI BiRads1    2/2/2024: Diagnostic mammogram-  BiRads4. Left breast mammogram guided  biopsy. Path: Atypical lobular hyperplasia (ALH).    Breast MRI in July (7/18 or later)          Recommend risk reducing strategies for women with 1.7% 5 year risk of breast cancer.         I spent a total of 33 minutes on the day of the visit. Please see the note for further information on patient assessment and treatment.     Victorina Cardoza, PETERSON, APRN, AGCNS-BC  Clinical Nurse Specialist  Cancer Risk Management Program  [facility]    The patient was seen in conjunction with me today.  I agree with the exam assessment and  am in agreement with the plan.    Imani Gallagher, APRN-CNS, OCN, AGN-BC  Clinical Nurse Specialist  Cancer Risk Management Program  [facility]      Cc:  MD Naomi Lockett MD

## 2024-02-21 ENCOUNTER — VIRTUAL VISIT (OUTPATIENT)
Dept: ONCOLOGY | Facility: CLINIC | Age: 49
End: 2024-02-21
Attending: CLINICAL NURSE SPECIALIST
Payer: COMMERCIAL

## 2024-02-21 VITALS
WEIGHT: 135 LBS | DIASTOLIC BLOOD PRESSURE: 88 MMHG | SYSTOLIC BLOOD PRESSURE: 128 MMHG | HEIGHT: 62 IN | BODY MASS INDEX: 24.84 KG/M2

## 2024-02-21 DIAGNOSIS — F41.1 GAD (GENERALIZED ANXIETY DISORDER): ICD-10-CM

## 2024-02-21 DIAGNOSIS — N60.99 ATYPICAL LOBULAR HYPERPLASIA (ALH) OF BREAST: Primary | ICD-10-CM

## 2024-02-21 PROCEDURE — 99214 OFFICE O/P EST MOD 30 MIN: CPT | Mod: 95

## 2024-02-21 RX ORDER — TAMOXIFEN CITRATE 20 MG/1
20 TABLET ORAL DAILY
Qty: 30 TABLET | Refills: 3 | Status: SHIPPED | OUTPATIENT
Start: 2024-02-21 | End: 2024-08-30

## 2024-02-21 ASSESSMENT — PAIN SCALES - GENERAL: PAINLEVEL: NO PAIN (0)

## 2024-02-21 NOTE — LETTER
2/21/2024         RE: Brittney Morris  [address] [facility] [address]        Dear Colleague,    Thank you for referring your patient, Brittney Morris, to the [facility]. Please see a copy of my visit note below.    Oncology Risk Management Consultation:  Date on this visit: 2/21/2024    Brittney Morris  returns to the clinic today for a follow up appointment. She requires high risk screening and surveillance to reduce her risk of cancer secondary to having a history of atypical lobular hyperplasia (ALH). She is considered to be at high risk for breast cancer, with a risk of up to 35%.    Primary Physician: Johana Agrawal MD     History Of Present Illness:  Ms. Morris is a very pleasant, healthy 48 year old female who presents with a history of atypical lobular hyperplasia.    Pertinent history:  Menarche at 12.  First child at 38.  12/3/2012 - Partial molar pregnancy.  Premenopausal.  Ovaries intact  1/22/2021-  Uterus, cervix, and bilateral fallopian tubes removed for bleeding: FINAL DIAGNOSIS:   Uterus, cervix, bilateral fallopian tubes, total hysterectomy and  bilateral salpingectomy   - Cervix: No significant abnormality   - Endometrium: Gland and stromal changes consistent with exogenous hormone effect   - Myometrium: Leiomyoma   - Fallopian tubes, bilateral: No significant abnormality   Breast Density: heterogeneously dense.  Hx of OCP use x3-4 years.  Vasectomy for birth control.  No hx of HRT.  6/7/2018: Right breast core biopsies x2:  Site A: 1230 position, 2 cm from nipple measuring 0.6 cm biopsied.  An X shaped clip was placed. Site B; 0.6 cm ill-defined hypoechoic lesion at 12:00, 4 cm from the nipple was biopsied -Q shaped clip was placed. Percutaneous core needle biopsy, right:  A.  Benign intraductal papilloma B: Fragments of a radial scar (complex sclerosing lesion (with associated calcifications.  There is no evidence of invasive  malignancy on this biopsy specimen.  Imaging reviewed with Dr. Santiago and Dr. Parisi who agreed the pathologic findings are concordant with radiologic findings.     2018 - Excisional biopsy, right breast: no evidence of residual radial scar, fibrocystic changes with usual ductal hyperplasia and microcalcifications. Negative for in situ or invasive carcinoma.  No hx of atypia or malignancy.   2021- Right stereotactic core biopsy, Pathology results:  RIGHT BREAST, 9:00, MID DEPTH, STEREOTACTIC CORE BIOPSY:   - Fibrocystic change (including apocrine metaplasia and microcysts),   columnar cell change, adenomyoepithelial   adenosis (focal), and usual duct hyperplasia (UDH).   - Benign intraductal papilloma (2 mm).   - Calcifications associated with benign acini.   - Negative for atypia or malignancy.   History of low dose tamoxifen from April-2019; stopped due to dizziness, side effects.    2024- LEFT breast, 3:00, calcifications, stereotactic core biopsy:  -Atypical lobular hyperplasia (ALH)  -Flat epithelial atypia (FEA)  -Other findings: Fibrocystic change (including microcysts) and columnar cell change  -Luminal calcifications in atypical and non-atypical acini  -Negative for malignancy     Genetic testin2019 NEGATIVE for mutations in the MARIIA, BRCA1, BRCA2, BRIP1, CDH1, CHEK2, EPCAM, MLH1, MSH2, MSH6, NBN, NF1, PALB2, PMS2, PTEN, RAD51C, RAD51D, STK11, and TP53 genes using a Custom Next-Cancer (19 genes, a combination of Gyn Plus and BRCA plus + NBN, STK11, and NF1) panel  from [employer].  No mutations were found in any of the 19 genes analyzed. This test involved sequencing and deletion/duplication analysis of all genes with the exception of EPCAM (deletions/duplications only). This testing was done because of her family history of her sister's breast cancer diagnosed at age 42, and her maternal aunt's breast cancer diagnosed in her 50's.     Pertinent screening  history:  7/14/2014 right breast ultrasound for palpable right axillary lump. Patient is currently breast-feeding.  BI-RADS Category 1.     6/2/2015: Baseline screening mammogram, BI-RADS 1.   10/11/2016: Screening Tomosynthesis mammogram, BI-RADS 1  .10/12/2017: Screening Tomosynthesis mammogram, BI-RADS 1.   6/6/2018 -- Digital mammogram diagnostic unilateral tomosynthesis and Ultrasound of the right medial breast:  An oval hypoechoic solid-appearing mass is present at the 12 o'clock position 4 cm from the nipple measuring 0.6 cm.  A round hypoechoic mass with through transmission is present at the 1230 position, 2 cm from the nipple measuring 0.6 cm which may represent a complicated cyst.  Scattered tiny benign cysts are noted in the medial right breast. BI-RADS category: 4 suspicious -ultrasound-guided biopsy recommended.  12/5/2018- Screening tomosynthesis mammogram, BiRads2  5/6/2019- Breast MRI, BiRads2  12/12/2019- Screening tomosynthesis mammogram, BiRads1  1/7/2021- Screening tomosynthesis mammogram, BiRads0 for Post biopsy changes right breast. Possible developing  calcifications mid depth right breast just lateral to central core mid  depth. (CC josafat 30/72). No significant change left breast.  1/11/2021- Diagnostic tomosynthesis mammogram, BiRads4 for group of amorphous calcifications in the slightly upper slightly outer right breast at mid depth posterior to the site of prior excision, for which additional evaluation with stereotactic guided biopsy is indicated.  1/19/2021- Biopsy:  2 mm benign papilloma.   7/2/2021- Breast MRI and diagnostic tomosynthesis mammogram of right breast, both BiRads1  1/18/2022- Screening tomosynthesis mammogram, BiRads1  7/8/2022- Breast MRI, BiRads2  1/24/2023- Screening tomosynthesis mammogram, BiRads1  7/18/2023- Breast MRI, BiRads1  1/30/2024- Screening tomosynthesis mammogram, BiRads0- There are possible calcifications in the left breast at the 3 o'clock position,  middle depth.  2/2/2024: Diagnostic mammogram- Magnification views demonstrates 1.2 cm grouping of amorphous  calcifications in the upper outer left breast. Bilateral contrast-enhanced tomography was performed. No suspicious enhancement in either breast. BiRads4. Left breast mammogram guided biopsy. Path: LEFT breast, 3:00, calcifications, stereotactic core biopsy: Atypical lobular hyperplasia (ALH). Flat epithelial atypia (FEA). Other findings: Fibrocystic change (including microcysts) and columnar cell change. Luminal calcifications in atypical and non-atypical acini. Negative for malignancy    At this visit, she denies new fatigue, breast asymmetry, lumps, masses, thickening, pain, nipple discharge and skin changes.      Past Medical/Surgical History:  Past Medical History:   Diagnosis Date    Anxiety     Missed ab     Seizures (H) 1985    Seizure as a child. Took meds for 2 years.    UTI (urinary tract infection)      Past Surgical History:   Procedure Laterality Date    BIOPSY BREAST SEED LOCALIZATION Right 8/22/2018    Procedure: BIOPSY BREAST SEED LOCALIZATION;  RIGHT SEED LOCALIZED BREAST BIOPSY ;  Surgeon: Naomi Lewis MD;  Location: [facility]    DILATION AND CURETTAGE SUCTION  12/3/2012    Procedure: DILATION AND CURETTAGE SUCTION;  SUCTION DILATION AND CURETTAGE;  Surgeon: Nargis Gordon MD;  Location: [facility]    wisdom teeth[         Allergies:  Allergies as of 02/21/2024    (No Known Allergies)       Current Medications:  Current Outpatient Medications   Medication Sig Dispense Refill    cholecalciferol 25 MCG (1000 UT) TABS Take 1,000 Units by mouth      levETIRAcetam (KEPPRA) 500 MG tablet TAKE 1 TABLET BY MOUTH TWICE A DAY      vitamin C (ASCORBIC ACID) 500 MG tablet Take 500 mg by mouth (Patient not taking: Reported on 10/31/2023)          Family History:  Family History   Problem Relation Age of Onset    Hypertension Mother     Breast Cancer Sister 42    Breast Cancer Maternal  Aunt 50    Esophageal Cancer Maternal Uncle 70       Social History:  Social History     Socioeconomic History    Marital status:      Spouse name: Jamison    Number of children: 2    Years of education: Not on file    Highest education level: Not on file   Occupational History     Employer: [employer]   Tobacco Use    Smoking status: Never    Smokeless tobacco: Never   Substance and Sexual Activity    Alcohol use: Yes     Comment: occas    Drug use: No    Sexual activity: Yes     Partners: Male     Birth control/protection: Male Surgical, Female Surgical   Other Topics Concern    Parent/sibling w/ CABG, MI or angioplasty before 65F 55M? Not Asked   Social History Narrative    Not on file     Social Determinants of Health     Financial Resource Strain: Low Risk  (10/31/2023)    Financial Resource Strain     Within the past 12 months, have you or your family members you live with been unable to get utilities (heat, electricity) when it was really needed?: No   Food Insecurity: Low Risk  (10/31/2023)    Food Insecurity     Within the past 12 months, did you worry that your food would run out before you got money to buy more?: No     Within the past 12 months, did the food you bought just not last and you didn t have money to get more?: No   Transportation Needs: Low Risk  (10/31/2023)    Transportation Needs     Within the past 12 months, has lack of transportation kept you from medical appointments, getting your medicines, non-medical meetings or appointments, work, or from getting things that you need?: No   Physical Activity: Not on file   Stress: Not on file   Social Connections: Not on file   Interpersonal Safety: Low Risk  (10/31/2023)    Interpersonal Safety     Do you feel physically and emotionally safe where you currently live?: Yes     Within the past 12 months, have you been hit, slapped, kicked or otherwise physically hurt by someone?: No     Within the past 12 months, have you been humiliated or  "emotionally abused in other ways by your partner or ex-partner?: No   Housing Stability: Low Risk  (10/31/2023)    Housing Stability     Do you have housing? : Yes     Are you worried about losing your housing?: No       Physical Exam:  /88   Ht 1.583 m (5' 2.32\")   Wt 61.2 kg (135 lb)   LMP 08/01/2018 (Approximate)   BMI 24.44 kg/m    GENERAL: alert and no distress  EYES: Eyes grossly normal to inspection.  No discharge or erythema, or obvious scleral/conjunctival abnormalities.  RESP: No audible wheeze, cough, or visible cyanosis.    SKIN: Visible skin clear. No significant rash, abnormal pigmentation or lesions.  NEURO: Cranial nerves grossly intact.  Mentation and speech appropriate for age.  PSYCH: Appropriate affect, tone, and pace of words      Laboratory/Imaging Studies  No results found for any visits on 02/21/24.    ASSESSMENT:    We discussed Brittney's recent breast imaging and new diagnosis of ALH. She is planning a prophylactic mastectomy this fall with Dr. Lewis. We will still plan for MRI in July to continue high risk screening (already ordered by Dr. Lewis). Today she is here to discuss resuming tamoxifen to reduce her risk of breast cancer prior to surgery. She was previously on this medication, however she developed heavy menstrual bleeding. She has since had a hysterectomy. She is willing to try the medication again. We discussed risks/benefits of different doses. We will proceed with the 20mg dose at this time. If Brittney has side effects at this dose, we discussed that she can cut down to 10mg daily. The prescription was sent to her pharmacy in [place].     Tamoxifen has been approved in the [place] to help lower the risk of breast cancer. It is in the class of medications called selective estrogen receptor modulators (or SERMs) and  acts against (or block) estrogen  in some tissues of the body, but act like estrogen in others. Estrogen can fuel the growth of breast cancer cells. It also " block estrogen in breast cells, which is why it can be useful in lowering the risk of breast cancer. Tamoxifen at  20 mg per day for 5 years was shown to reduce risk of breast cancer by 49%, even up to 10 years after the therapy is complete.   Side effects may include hot flashes and other menopausal symptoms in some women, which typically resolve after th course of treatment is finished. Adverse effects occur in 1% of women: blood clots or uterine hyperplasia, some elevation of cholesterol and increased risk for cataracts.      Individualized Surveillance Plan for women  With 20% or greater lifetime risk of breast cancer   Per NCCN Breast Cancer Screening and Diagnosis Guidelines Version 1.2023   Recommended screening Test or procedure Last done Next Scheduled    Clinical encounter Clinical exam every 6-12 months.   Refer to genetic counseling if not already done.  Consider risk reduction strategies.   Feb 2024 Feb 2025   However, some family histories with breast cancers at a very young age, may warrant screening starting earlier.    *May begin at age 40 if breast cancers in the family occur at later ages.    Annual mammogram beginning 10 years younger than the earliest breast cancer in the family but not prior to age 30.    Recommend annual breast MRI to begin 10 years younger than the earliest breast cancer in the family but not prior to age 25.    Breast MRIs are preferably done on day 7-15 of the menstrual cycle in premenopausal women.     NA     NA   Breast screening for patients at high risk due to thoracic radiation between the ages of 10-30   Annual clinical exam beginning 8 years after radiation therapy.    Annual screening mammogram beginning at age 30 or 8 years after radiation therapy    Annual breast MRI, beginning at age 25 or 8 years after radiation therapy.     NA   NA   Women who have a lifetime risk of >20% based on history of LCIS or ADH/ALH Annual screening mammogram beginning at age of LCIS  or ADH/ALH but not prior to age 30.    Consider annual MRI to begin at age of diagnosis of LCIS or ADH/ALH but not prior to age 25.    Recommend risk reducing strategies if possible.   7/18/2023: Breast MRI BiRads1    2/2/2024: Diagnostic mammogram-  BiRads4. Left breast mammogram guided biopsy. Path: Atypical lobular hyperplasia (ALH).    Breast MRI in July (7/18 or later)          Recommend risk reducing strategies for women with 1.7% 5 year risk of breast cancer.         I spent a total of 33 minutes on the day of the visit. Please see the note for further information on patient assessment and treatment.     Victorina Cardoza, PETERSON, APRN, AGCNS-BC  Clinical Nurse Specialist  Cancer Risk Management Program  [facility]    The patient was seen in conjunction with me today.  I agree with the exam assessment and  am in agreement with the plan.    Imani Gallagher, APRN-CNS, OCN, AGN-BC  Clinical Nurse Specialist  Cancer Risk Management Program  [facility]      Cc:  MD Naomi Lockett MD

## 2024-02-21 NOTE — TELEPHONE ENCOUNTER
Please call pt and see if she's been taking the sertraline - see our last appt on 10/31/23 (and mychart 11/7/23).  If she is taking it, should schedule a VVV to discuss (and send in refills if they are needed).  If she's stopped, would see why (if se's or just did not feel needed), okay to hold off on follow-up if doing fine off of it.  Thanks!  CW

## 2024-02-21 NOTE — PATIENT INSTRUCTIONS
Individualized Surveillance Plan for women  With 20% or greater lifetime risk of breast cancer   Per NCCN Breast Cancer Screening and Diagnosis Guidelines Version 1.2023   Recommended screening Test or procedure Last done Next Scheduled    Clinical encounter Clinical exam every 6-12 months.   Refer to genetic counseling if not already done.  Consider risk reduction strategies.   Feb 2024 Feb 2025   However, some family histories with breast cancers at a very young age, may warrant screening starting earlier.    *May begin at age 40 if breast cancers in the family occur at later ages.    Annual mammogram beginning 10 years younger than the earliest breast cancer in the family but not prior to age 30.    Recommend annual breast MRI to begin 10 years younger than the earliest breast cancer in the family but not prior to age 25.    Breast MRIs are preferably done on day 7-15 of the menstrual cycle in premenopausal women.     NA     NA   Breast screening for patients at high risk due to thoracic radiation between the ages of 10-30   Annual clinical exam beginning 8 years after radiation therapy.    Annual screening mammogram beginning at age 30 or 8 years after radiation therapy    Annual breast MRI, beginning at age 25 or 8 years after radiation therapy.     NA   NA   Women who have a lifetime risk of >20% based on history of LCIS or ADH/ALH Annual screening mammogram beginning at age of LCIS or ADH/ALH but not prior to age 30.    Consider annual MRI to begin at age of diagnosis of LCIS or ADH/ALH but not prior to age 25.    Recommend risk reducing strategies if possible.   7/18/2023: Breast MRI BiRads1    2/2/2024: Diagnostic mammogram-  BiRads4. Left breast mammogram guided biopsy. Path: Atypical lobular hyperplasia (ALH).    Breast MRI in July (7/18 or later)          Recommend risk reducing strategies for women with 1.7% 5 year risk of breast cancer.

## 2024-02-21 NOTE — NURSING NOTE
Is the patient currently in the state of [place]? YES    Visit mode:VIDEO    If the visit is dropped, the patient can be reconnected by: VIDEO VISIT: Text to cell phone:   Telephone Information:   Mobile [phone]       Will anyone else be joining the visit? NO  (If patient encounters technical issues they should call [phone]    How would you like to obtain your AVS? MyChart    Are changes needed to the allergy or medication list? No    Reason for visit: RECHECK    Naomi PARK

## 2024-02-21 NOTE — PROGRESS NOTES
Virtual Visit Details    Type of service:  Video Visit     Originating Location (pt. Location): Home  Distant Location (provider location):  On-site  Platform used for Video Visit: Blossom

## 2024-04-24 DIAGNOSIS — F41.1 GAD (GENERALIZED ANXIETY DISORDER): ICD-10-CM

## 2024-04-24 NOTE — TELEPHONE ENCOUNTER
Saw pt 10/31/23, started sertraline, rec 6 week follow-up appt.  Unsure if she's still on it or not (at 2/21/24 appt, they had her marked as not taking it).    Could you check with pt if she's taking it or not?  If she is taking it, I should see her for a follow-up appt (virtual video), and can send in refills in the meantime.  Think her insurance needs #[phone] sent.  Thanks!  CW

## 2024-04-30 NOTE — TELEPHONE ENCOUNTER
Spoke with patient.  States she is no longer taking.  Thought she cancelled out at pharmacy.  Refused.  Lena KOTHARI RN

## 2024-05-15 ENCOUNTER — TRANSFERRED RECORDS (OUTPATIENT)
Dept: HEALTH INFORMATION MANAGEMENT | Facility: CLINIC | Age: 49
End: 2024-05-15
Payer: COMMERCIAL

## 2024-07-16 ENCOUNTER — ANCILLARY PROCEDURE (OUTPATIENT)
Dept: MRI IMAGING | Facility: CLINIC | Age: 49
End: 2024-07-16
Attending: SURGERY
Payer: COMMERCIAL

## 2024-07-16 DIAGNOSIS — N60.82 FLAT EPITHELIAL ATYPIA (FEA) OF LEFT BREAST: ICD-10-CM

## 2024-07-16 DIAGNOSIS — Z12.39 BREAST CANCER SCREENING, HIGH RISK PATIENT: ICD-10-CM

## 2024-07-16 DIAGNOSIS — N60.92 ATYPICAL LOBULAR HYPERPLASIA (ALH) OF LEFT BREAST: ICD-10-CM

## 2024-07-16 PROCEDURE — 77049 MRI BREAST C-+ W/CAD BI: CPT | Mod: GC | Performed by: STUDENT IN AN ORGANIZED HEALTH CARE EDUCATION/TRAINING PROGRAM

## 2024-07-16 PROCEDURE — A9585 GADOBUTROL INJECTION: HCPCS | Performed by: STUDENT IN AN ORGANIZED HEALTH CARE EDUCATION/TRAINING PROGRAM

## 2024-07-16 RX ORDER — GADOBUTROL 604.72 MG/ML
7.5 INJECTION INTRAVENOUS ONCE
Status: COMPLETED | OUTPATIENT
Start: 2024-07-16 | End: 2024-07-16

## 2024-07-16 RX ADMIN — GADOBUTROL 6 ML: 604.72 INJECTION INTRAVENOUS at 11:54

## 2024-07-16 NOTE — DISCHARGE INSTRUCTIONS
MRI Contrast Discharge Instructions    The IV contrast you received today will pass out of your body in your  urine. This will happen in the next 24 hours. You will not feel this process.  Your urine will not change color.    Drink at least 4 extra glasses of water or juice today (unless your doctor  has restricted your fluids). This reduces the stress on your kidneys.  You may take your regular medicines.    If you are on dialysis: It is best to have dialysis today.    If you have a reaction: Most reactions happen right away. If you have  any new symptoms after leaving the hospital (such as hives or swelling),  call your hospital at the correct number below. Or call your family doctor.  If you have breathing distress or wheezing, call 911.    Special instructions: ***    I have read and understand the above information.    Signature:______________________________________ Date:___________    Staff:__________________________________________ Date:___________     Time:__________    [facility] Departments:    ___[place]: [phone]  ___[facility]: [phone]  ___[facility]: [phone] ___[facility]: [phone]  ___[facility]: [phone]  ___[facility]: [phone]  ___[phone]  ___[facility]: [phone]  ___[phone]

## 2024-07-18 ENCOUNTER — OFFICE VISIT (OUTPATIENT)
Dept: SURGERY | Facility: CLINIC | Age: 49
End: 2024-07-18
Payer: COMMERCIAL

## 2024-07-18 DIAGNOSIS — N60.82 FLAT EPITHELIAL ATYPIA (FEA) OF LEFT BREAST: Primary | ICD-10-CM

## 2024-07-18 DIAGNOSIS — Z12.39 BREAST CANCER SCREENING, HIGH RISK PATIENT: ICD-10-CM

## 2024-07-18 DIAGNOSIS — N60.92 ATYPICAL LOBULAR HYPERPLASIA (ALH) OF LEFT BREAST: ICD-10-CM

## 2024-07-18 PROCEDURE — 99213 OFFICE O/P EST LOW 20 MIN: CPT | Performed by: SURGERY

## 2024-07-18 RX ORDER — MULTIVITAMIN WITH IRON
1 TABLET ORAL DAILY
COMMUNITY

## 2024-07-18 NOTE — PROGRESS NOTES
[facility] Follow Up Note    CHIEF COMPLAINT:  History of left ALH and FEA and right radial scar    HISTORY OF PRESENT ILLNESS:  Brittney Morris is a 48 year old female who is seen in follow up for left breast atypical lobular hyperplasia and FEA.      She is well known to me as I had seen Brittney in 2018 for a radial scar on her right breast for which we did a tag localized excisional biopsy. Her final pathology was benign. Her lifetime risk for breast cancer is 29% using the Bulmaro model and a 42.9% using the JACOB risk calculator. and she has followed since with [facility] Ruiz with our high risk clinic. She had tried tamoxifen but did not tolerate well due to abnormal vaginal bleeding. She has since had a hysterectomy with Dr. Walton and surgery went very well.  She resumed tamoxifen earlier this year and is tolerating it well.     She had a screening mammogram on 1/30/2024 which revealed a 1cm span of microcalcifications in the lateral left breast. She had stereotactic biopsy of this which revealed ALH and FEA. She also had a biopsy on the right breast in 2021 which revealed a papilloma.     She had a follow-up MRI on 7/16/2024 which was benign.    She met with Dr. Son and discussed implant based reconstruction. She would like to see him back prior to surgery as she is now leaning toward [facility] and would like to be smaller (around a B cup).          Hormonal history:  Pre menopausal, 5 years OCP use, no HRT, no fertility treatment.  2 children, 1st at age 38, menarche 12. She  for 2 months per child.      Family history of breast cancer: Yes - sister at 42 and maternal aunt in her 50s.   Family history of ovarian cancer:  No  Family history of colon cancer: No  Family history of prostate cancer: No.        Past Medical History:   Diagnosis Date    Anxiety     Missed ab     Seizures (H) 1985    Seizure as a child. Took meds for 2 years.    UTI (urinary tract infection)         Past Surgical History:   Procedure Laterality Date    BIOPSY BREAST SEED LOCALIZATION Right 8/22/2018    Procedure: BIOPSY BREAST SEED LOCALIZATION;  RIGHT SEED LOCALIZED BREAST BIOPSY ;  Surgeon: Naomi Lewis MD;  Location: [facility]    DILATION AND CURETTAGE SUCTION  12/3/2012    Procedure: DILATION AND CURETTAGE SUCTION;  SUCTION DILATION AND CURETTAGE;  Surgeon: Nargis Gordon MD;  Location: [facility]    wisdom teeth[         Family History   Problem Relation Age of Onset    Hypertension Mother     Breast Cancer Sister 42    Breast Cancer Maternal Aunt 50    Esophageal Cancer Maternal Uncle 70       Social History     Tobacco Use    Smoking status: Never    Smokeless tobacco: Never   Substance Use Topics    Alcohol use: Yes     Comment: occas       Patient Active Problem List   Diagnosis    Vacuum extractor delivery, delivered    Pain in joint involving pelvic region and thigh    Female stress incontinence    Syncope    Dysplastic nevi    Gastroesophageal reflux disease without esophagitis    Leiomyoma of uterus    Neoplasm of uncertain behavior of skin    Vitamin deficiency    KVNG (generalized anxiety disorder)    Hemorrhoid    Hyperlipidemia    Seizure disorder (H)    Conjunctival cyst of both eyes    History of dysplastic nevus    Myopia of both eyes with astigmatism and presbyopia    Superficial punctate keratitis, left    History of heartburn    Breast cancer screening, high risk patient     No Known Allergies  Current Outpatient Medications   Medication Sig Dispense Refill    cholecalciferol 25 MCG (1000 UT) TABS Take 1,000 Units by mouth      levETIRAcetam (KEPPRA) 500 MG tablet TAKE 1 TABLET BY MOUTH TWICE A DAY      tamoxifen (NOLVADEX) 20 MG tablet Take 1 tablet (20 mg) by mouth daily 30 tablet 3    vitamin C (ASCORBIC ACID) 500 MG tablet Take 500 mg by mouth (Patient not taking: Reported on 10/31/2023)       Vitals: LMP 08/01/2018 (Approximate)   BMI= There is no height or weight  on file to calculate BMI.    EXAM:  GENERAL: healthy, alert and no distress   BREAST:  breasts are symmetric. NAC normal. Skin normal. No masses palpable in either breast.   There is no axillary or supraclavicular lymphadenopathy.  CARDIOVASCULAR:  RRR  RESPIRATORY: nonlabored breathing  NECK: Neck supple. No adenopathy. Thyroid symmetric, normal size  SKIN: No suspicious lesions or rashes  LYMPH: Normal cervical lymph nodes      ASSESSMENT/PLAN:  Brittney Morris is a 48-year-old female who is at increased lifetime risk for breast cancer as calculated above.  We reviewed her MRI images together and it is great news that there are no areas of concern.  Her clinical breast exam is also benign.  We discussed options going forward include continuing high risk screening and tamoxifen versus consideration of prophylactic mastectomies.  We discussed mastectomy surgery at length including options for reconstruction.  At this time she would like to proceed with follow up with Dr. Son to discuss [facility] and then scheduling in the fall. I will have Laura call to assist in scheduling.          20 minutes total time spent on the date of this encounter doing: chart review, review of test results, patient visit, physical exam, education, counseling, developing plan of care, and documenting.      Naomi Lewis MD  [facility]  [phone]        Please route or send letter to:  Primary Care Provider (PCP) and Referring Provider

## 2024-07-18 NOTE — NURSING NOTE
Brittney Morris's goals for this visit include:   Chief Complaint   Patient presents with    RECHECK     High risk, review MRI results       She requests these members of her care team be copied on today's visit information: no    PCP: Ana Maria Cortez    Referring Provider:  Referred Self, MD  No address on file    LMP 08/01/2018 (Approximate)     Do you need any medication refills at today's visit? No    Georgiana Bowser LPN

## 2024-07-18 NOTE — LETTER
7/18/2024      Brittney Morris  [address]  [place] [address]      Dear Colleague,    Thank you for referring your patient, Brittney Morris, to the [facility]. Please see a copy of my visit note below.    [facility] Follow Up Note    CHIEF COMPLAINT:  History of left ALH and FEA and right radial scar    HISTORY OF PRESENT ILLNESS:  Brittney Morris is a 48 year old female who is seen in follow up for left breast atypical lobular hyperplasia and FEA.      She is well known to me as I had seen Brittney in 2018 for a radial scar on her right breast for which we did a tag localized excisional biopsy. Her final pathology was benign. Her lifetime risk for breast cancer is 29% using the Bulmaro model and a 42.9% using the JACOB risk calculator. and she has followed since with [facility] with our high risk clinic. She had tried tamoxifen but did not tolerate well due to abnormal vaginal bleeding. She has since had a hysterectomy with Dr. Walton and surgery went very well.  She resumed tamoxifen earlier this year and is tolerating it well.     She had a screening mammogram on 1/30/2024 which revealed a 1cm span of microcalcifications in the lateral left breast. She had stereotactic biopsy of this which revealed ALH and FEA. She also had a biopsy on the right breast in 2021 which revealed a papilloma.     She had a follow-up MRI on 7/16/2024 which was benign.    She met with Dr. Son and discussed implant based reconstruction. She would like to see him back prior to surgery as she is now leaning toward [facility] and would like to be smaller (around a B cup).          Hormonal history:  Pre menopausal, 5 years OCP use, no HRT, no fertility treatment.  2 children, 1st at age 38, menarche 12. She  for 2 months per child.      Family history of breast cancer: Yes - sister at 42 and maternal aunt in her 50s.   Family history of ovarian cancer:   No  Family history of colon cancer: No  Family history of prostate cancer: No.        Past Medical History:   Diagnosis Date     Anxiety      Missed ab      Seizures (H) 1985    Seizure as a child. Took meds for 2 years.     UTI (urinary tract infection)        Past Surgical History:   Procedure Laterality Date     BIOPSY BREAST SEED LOCALIZATION Right 8/22/2018    Procedure: BIOPSY BREAST SEED LOCALIZATION;  RIGHT SEED LOCALIZED BREAST BIOPSY ;  Surgeon: Naomi Lewis MD;  Location: [facility]     DILATION AND CURETTAGE SUCTION  12/3/2012    Procedure: DILATION AND CURETTAGE SUCTION;  SUCTION DILATION AND CURETTAGE;  Surgeon: Nargis Gordon MD;  Location: [facility]     wisdom teeth[         Family History   Problem Relation Age of Onset     Hypertension Mother      Breast Cancer Sister 42     Breast Cancer Maternal Aunt 50     Esophageal Cancer Maternal Uncle 70       Social History     Tobacco Use     Smoking status: Never     Smokeless tobacco: Never   Substance Use Topics     Alcohol use: Yes     Comment: occas       Patient Active Problem List   Diagnosis     Vacuum extractor delivery, delivered     Pain in joint involving pelvic region and thigh     Female stress incontinence     Syncope     Dysplastic nevi     Gastroesophageal reflux disease without esophagitis     Leiomyoma of uterus     Neoplasm of uncertain behavior of skin     Vitamin deficiency     KVNG (generalized anxiety disorder)     Hemorrhoid     Hyperlipidemia     Seizure disorder (H)     Conjunctival cyst of both eyes     History of dysplastic nevus     Myopia of both eyes with astigmatism and presbyopia     Superficial punctate keratitis, left     History of heartburn     Breast cancer screening, high risk patient     No Known Allergies  Current Outpatient Medications   Medication Sig Dispense Refill     cholecalciferol 25 MCG (1000 UT) TABS Take 1,000 Units by mouth       levETIRAcetam (KEPPRA) 500 MG tablet TAKE 1 TABLET BY MOUTH  TWICE A DAY       tamoxifen (NOLVADEX) 20 MG tablet Take 1 tablet (20 mg) by mouth daily 30 tablet 3     vitamin C (ASCORBIC ACID) 500 MG tablet Take 500 mg by mouth (Patient not taking: Reported on 10/31/2023)       Vitals: LMP 08/01/2018 (Approximate)   BMI= There is no height or weight on file to calculate BMI.    EXAM:  GENERAL: healthy, alert and no distress   BREAST:  breasts are symmetric. NAC normal. Skin normal. No masses palpable in either breast.   There is no axillary or supraclavicular lymphadenopathy.  CARDIOVASCULAR:  RRR  RESPIRATORY: nonlabored breathing  NECK: Neck supple. No adenopathy. Thyroid symmetric, normal size  SKIN: No suspicious lesions or rashes  LYMPH: Normal cervical lymph nodes      ASSESSMENT/PLAN:  Brittney Morris is a 48-year-old female who is at increased lifetime risk for breast cancer as calculated above.  We reviewed her MRI images together and it is great news that there are no areas of concern.  Her clinical breast exam is also benign.  We discussed options going forward include continuing high risk screening and tamoxifen versus consideration of prophylactic mastectomies.  We discussed mastectomy surgery at length including options for reconstruction.  At this time she would like to proceed with follow up with Dr. Son to discuss [facility] and then scheduling in the fall. I will have Laura call to assist in scheduling.          20 minutes total time spent on the date of this encounter doing: chart review, review of test results, patient visit, physical exam, education, counseling, developing plan of care, and documenting.      Naomi Lewis MD  [facility]  [phone]        Please route or send letter to:  Primary Care Provider (PCP) and Referring Provider         Again, thank you for allowing me to participate in the care of your patient.        Sincerely,        Naomi Lewis MD

## 2024-08-22 ENCOUNTER — TELEPHONE (OUTPATIENT)
Dept: SURGERY | Facility: CLINIC | Age: 49
End: 2024-08-22
Payer: COMMERCIAL

## 2024-08-22 RX ORDER — CEFAZOLIN SODIUM/WATER 2 G/20 ML
2 SYRINGE (ML) INTRAVENOUS
Status: CANCELLED | OUTPATIENT
Start: 2024-08-22

## 2024-08-22 RX ORDER — CEFAZOLIN SODIUM/WATER 2 G/20 ML
2 SYRINGE (ML) INTRAVENOUS SEE ADMIN INSTRUCTIONS
Status: CANCELLED | OUTPATIENT
Start: 2024-08-22

## 2024-08-22 NOTE — TELEPHONE ENCOUNTER
Type of surgery: Bilateral prophylactic mastectomy  Location of surgery: [facility]  Date and time of surgery: 10/7/24 at 12:30pm  Surgeon: Dr. Naomi Lewis  Pre-Op Appt Date: patient to schedule  Post-Op Appt Date: patient to schedule   Packet sent out: Yes  Pre-cert/Authorization completed:  Not Applicable  Date: 8/22/24

## 2024-08-30 ENCOUNTER — TELEPHONE (OUTPATIENT)
Dept: ONCOLOGY | Facility: CLINIC | Age: 49
End: 2024-08-30
Payer: COMMERCIAL

## 2024-08-30 DIAGNOSIS — N60.99 ATYPICAL LOBULAR HYPERPLASIA (ALH) OF BREAST: ICD-10-CM

## 2024-08-30 NOTE — TELEPHONE ENCOUNTER
Pending Prescriptions:                       Disp   Refills    tamoxifen (NOLVADEX) 20 MG tablet         30 tab*3            Sig: Take 1 tablet (20 mg) by mouth daily.  Last prescribing provider: Victorina Cardoza     Last clinic visit date: 02/21/24 w/Imani Gallagher    Recommendations for requested medication (if none, N/A): N/A    Any other pertinent information (if none, N/A): N/A    Refilled: Y/N, if NO, why?

## 2024-09-03 ENCOUNTER — TELEPHONE (OUTPATIENT)
Dept: ONCOLOGY | Facility: CLINIC | Age: 49
End: 2024-09-03
Payer: COMMERCIAL

## 2024-09-03 RX ORDER — TAMOXIFEN CITRATE 20 MG/1
10 TABLET ORAL DAILY
Qty: 30 TABLET | Refills: 1 | Status: SHIPPED | OUTPATIENT
Start: 2024-09-03

## 2024-09-24 ASSESSMENT — ANXIETY QUESTIONNAIRES
GAD7 TOTAL SCORE: 3
GAD7 TOTAL SCORE: 3
7. FEELING AFRAID AS IF SOMETHING AWFUL MIGHT HAPPEN: NOT AT ALL
8. IF YOU CHECKED OFF ANY PROBLEMS, HOW DIFFICULT HAVE THESE MADE IT FOR YOU TO DO YOUR WORK, TAKE CARE OF THINGS AT HOME, OR GET ALONG WITH OTHER PEOPLE?: NOT DIFFICULT AT ALL
GAD7 TOTAL SCORE: 3

## 2024-09-24 ASSESSMENT — PATIENT HEALTH QUESTIONNAIRE - PHQ9
SUM OF ALL RESPONSES TO PHQ QUESTIONS 1-9: 0
SUM OF ALL RESPONSES TO PHQ QUESTIONS 1-9: 0

## 2024-09-25 ENCOUNTER — OFFICE VISIT (OUTPATIENT)
Dept: FAMILY MEDICINE | Facility: CLINIC | Age: 49
End: 2024-09-25
Payer: COMMERCIAL

## 2024-09-25 VITALS
BODY MASS INDEX: 23.53 KG/M2 | SYSTOLIC BLOOD PRESSURE: 118 MMHG | WEIGHT: 130 LBS | OXYGEN SATURATION: 99 % | RESPIRATION RATE: 20 BRPM | TEMPERATURE: 96.8 F | HEART RATE: 90 BPM | DIASTOLIC BLOOD PRESSURE: 84 MMHG

## 2024-09-25 DIAGNOSIS — Z01.818 PREOP GENERAL PHYSICAL EXAM: Primary | ICD-10-CM

## 2024-09-25 DIAGNOSIS — Z12.39 BREAST CANCER SCREENING, HIGH RISK PATIENT: ICD-10-CM

## 2024-09-25 DIAGNOSIS — Z71.85 VACCINE COUNSELING: ICD-10-CM

## 2024-09-25 DIAGNOSIS — N60.82 FLAT EPITHELIAL ATYPIA (FEA) OF LEFT BREAST: ICD-10-CM

## 2024-09-25 DIAGNOSIS — N60.92 ATYPICAL LOBULAR HYPERPLASIA (ALH) OF LEFT BREAST: ICD-10-CM

## 2024-09-25 PROCEDURE — 99214 OFFICE O/P EST MOD 30 MIN: CPT | Performed by: FAMILY MEDICINE

## 2024-09-25 ASSESSMENT — PAIN SCALES - GENERAL: PAINLEVEL: NO PAIN (0)

## 2024-09-25 NOTE — PATIENT INSTRUCTIONS

## 2024-09-25 NOTE — PROGRESS NOTES
Answers submitted by the patient for this visit:  Patient Health Questionnaire (Submitted on 9/24/2024)  PHQ9 TOTAL SCORE: 0  Patient Health Questionnaire (G7) (Submitted on 9/24/2024)  KVNG 7 TOTAL SCORE: 3      Preoperative Evaluation  [facility]  [address] [place], [place] [address], [address]  [facility] [address]  Phone: [phone]  Primary Provider: Ana Maria Cortez MD  Pre-op Performing Provider: Ana Maria Cortez MD  Sep 25, 2024               9/24/2024   Surgical Information   What procedure is being done? Double mastectomy   Facility or Hospital where procedure/surgery will be performed: [facility]   Who is doing the procedure / surgery? Dr Naomi Martínez   Date of surgery / procedure: 10/7/24   Time of surgery / procedure: 12:30pm   Where do you plan to recover after surgery? at home with family        Fax number for surgical facility: Note does not need to be faxed, will be available electronically in Epic.    Assessment & Plan     The proposed surgical procedure is considered INTERMEDIATE risk.      ICD-10-CM    1. Preop general physical exam  Z01.818       2. Flat epithelial atypia (FEA) of left breast  N60.82       3. Atypical lobular hyperplasia (ALH) of left breast  N60.92       4. Breast cancer screening, high risk patient  Z12.39            Risks and Recommendations  The patient has the following additional risks and recommendations for perioperative complications:   - No identified additional risk factors other than previously addressed    Preoperative Medication Instructions  Antiplatelet or Anticoagulation Medication Instructions   - Patient is on no antiplatelet or anticoagulation medications.    Additional Medication Instructions  Take all scheduled medications on the day of surgery EXCEPT for modifications listed below:     - Herbal medications and vitamins: DO NOT TAKE 14 days prior to surgery.   - Antiepileptics: Continue without modification.   -  Tamoxifen: Continue without modification    Recommendation  Approval given to proceed with proposed procedure, without further diagnostic evaluation.    Gabriele Lugo is a 48 year old, presenting for the following:  Pre-Op Exam          9/25/2024     9:57 AM   Additional Questions   Roomed by sabine lee   Accompanied by lavonne juarez         9/25/2024     9:57 AM   Patient Reported Additional Medications   Patient reports taking the following new medications n/a     HPI related to upcoming procedure:     Mammogram in 1/24  Bx's in 2/24  MRI in 7/24  Started tamoxifen after MRI.  Doing okay on lower dose- 10mg/d.        9/24/2024   Pre-Op Questionnaire   Have you ever had a heart attack or stroke? No   Have you ever had surgery on your heart or blood vessels, such as a stent placement, a coronary artery bypass, or surgery on an artery in your head, neck, heart, or legs? No   Do you have chest pain with activity? No   Do you have a history of heart failure? No   Do you currently have a cold, bronchitis or symptoms of other infection? No   Do you have a cough, shortness of breath, or wheezing? No   Do you or anyone in your family have previous history of blood clots? No   Do you or does anyone in your family have a serious bleeding problem such as prolonged bleeding following surgeries or cuts? No   Have you ever had problems with anemia or been told to take iron pills? No   Have you had any abnormal blood loss such as black, tarry or bloody stools, or abnormal vaginal bleeding? No   Have you ever had a blood transfusion? No   Are you willing to have a blood transfusion if it is medically needed before, during, or after your surgery? Yes   Have you or any of your relatives ever had problems with anesthesia? No   Do you have sleep apnea, excessive snoring or daytime drowsiness? No   Do you have any artifical heart valves or other implanted medical devices like a pacemaker, defibrillator, or continuous glucose monitor? No   Do  you have artificial joints? No   Are you allergic to latex? No        Health Care Directive  Patient does not have a Health Care Directive or Living Will: Discussed advance care planning with patient; information given to patient to review.    Preoperative Review of    reviewed - no record of controlled substances prescribed.      Status of Chronic Conditions:  See problem list for active medical problems.  Problems all longstanding and stable, except as noted/documented.  See ROS for pertinent symptoms related to these conditions.    Patient Active Problem List    Diagnosis Date Noted    Breast cancer screening, high risk patient 12/05/2023     Priority: Medium     Following with Imani Gallagher, q6mo mammograms/breast MRIs.   Was on tamoxifem x 6mo in 2019 due to high risk, stopped due to se's of dizziness.      History of heartburn 10/31/2023     Priority: Medium    Conjunctival cyst of both eyes 05/10/2022     Priority: Medium    Myopia of both eyes with astigmatism and presbyopia 05/10/2022     Priority: Medium    Superficial punctate keratitis, left 05/10/2022     Priority: Medium    History of dysplastic nevus 01/31/2022     Priority: Medium     Formatting of this note might be different from the original.  Moderately DN, left labia minora, s/p shave 11/2/2007  Mildly DN, right inferior chest, s/p punch biopsy 8/3/2009  Severely DN with spitzoid features, right upper back, s/p excision 5/11/2011  Formatting of this note might be different from the original.   DN, moderate atypia, left labia minora, s/p shave 2007 (clinical monitoring)   DN, mild atypia, right lower chest, s/p shave 2009   DN, severe atypia with spitzoid features, right upper back, s/p excision 2011      Hyperlipidemia 08/05/2021     Priority: Medium    KVNG (generalized anxiety disorder) 08/04/2021     Priority: Medium     Long-standing anxiety.  Sertraline worked well ~1267-1286 (stopped when it ran out during covid).  Trial of Lexapro in  '22- se's of palpitations, improved after stopping.  Trial of sertraline again, but had diarrhea (was also on gerd meds then)      Hemorrhoid 08/04/2021     Priority: Medium    Seizure disorder (H) 08/04/2021     Priority: Medium     Seizure in 4th grade, 2017 at work (possible vasovagal), and one more at home.  EEG okay.  Has a neurologist, Dr. Cintron, [facility].   Keppra, 2018/19  Also takes B12, magnesium, Vit D.      Dysplastic nevi 03/14/2019     Priority: Medium     - Severely atypical nevus with spitzoid features, right upper back, s/p excision 2011  - Mildly dysplastic nevus, right lower chest, 2009  - Moderately dysplastic nevus, left labia minora 2007 (biopsy had positive margins but had completely healed by time of reexcision and site could not be identified). Clinical monitoring.    Formatting of this note might be different from the original.  - Severely atypical nevus with spitzoid features, right upper back, s/p excision 2011  - Mildly dysplastic nevus, right lower chest, 2009  - Moderately dysplastic nevus, left labia minora 2007 (biopsy had positive margins but had completely healed by time of reexcision and site could not be identified). Clinical monitoring.      Vitamin deficiency 08/21/2018     Priority: Medium     Overview:   Summer 2018 neurologist stated she is low in vitamin D, C, B12 and magnesium    Formatting of this note might be different from the original.  Summer 2018 neurologist stated she is low in vitamin D, C, B12 and magnesium      Syncope 10/10/2017     Priority: Medium    Gastroesophageal reflux disease without esophagitis 07/14/2017     Priority: Medium     Overview:   Rx prn TUMS, ranitidine    Formatting of this note might be different from the original.  Rx prn TUMS, ranitidine      Pain in joint involving pelvic region and thigh 05/12/2015     Priority: Medium    Female stress incontinence 05/12/2015     Priority: Medium    Vacuum extractor delivery,  delivered 05/23/2014     Priority: Medium    Leiomyoma of uterus 09/29/2008     Priority: Medium     Overview:     ultrasound 9/2008    Formatting of this note might be different from the original.    ultrasound 9/2008      Neoplasm of uncertain behavior of skin 09/24/2008     Priority: Medium     Overview:     left labia minora 2007   Dysplastic Nevus    Formatting of this note might be different from the original.    left labia minora 2007   Dysplastic Nevus        Past Medical History:   Diagnosis Date    Anxiety     Missed ab     Seizures (H) 1985    Seizure as a child. Took meds for 2 years.    UTI (urinary tract infection)      Past Surgical History:   Procedure Laterality Date    BIOPSY      BIOPSY BREAST SEED LOCALIZATION Right 08/22/2018    Procedure: BIOPSY BREAST SEED LOCALIZATION;  RIGHT SEED LOCALIZED BREAST BIOPSY ;  Surgeon: Naomi Lewis MD;  Location: [facility]    BREAST SURGERY      COLONOSCOPY      DILATION AND CURETTAGE SUCTION  12/03/2012    Procedure: DILATION AND CURETTAGE SUCTION;  SUCTION DILATION AND CURETTAGE;  Surgeon: Nargis Gordon MD;  Location: [facility]    HYSTERECTOMY, PAP NO LONGER INDICATED      cervix/uterus removed, ovaries remain (unsure tubes)    wisdom teeth[         Current Outpatient Medications   Medication Sig Dispense Refill    cholecalciferol 25 MCG (1000 UT) TABS Take 1,000 Units by mouth      levETIRAcetam (KEPPRA) 500 MG tablet TAKE 1 TABLET BY MOUTH TWICE A DAY      magnesium 250 MG tablet Take 1 tablet by mouth daily      tamoxifen (NOLVADEX) 20 MG tablet Take 0.5 tablets (10 mg) by mouth daily. 30 tablet 1    vitamin C (ASCORBIC ACID) 500 MG tablet Take 500 mg by mouth         No Known Allergies     Social History     Tobacco Use    Smoking status: Never    Smokeless tobacco: Never   Substance Use Topics    Alcohol use: Yes     Comment: occas     Family History   Problem Relation Age of Onset    Hypertension Mother     Breast Cancer Sister 42     "Breast Cancer Maternal Aunt 50    Esophageal Cancer Maternal Uncle 70     History   Drug Use No             Review of Systems  CONSTITUTIONAL: NEGATIVE for fever, chills, change in weight  INTEGUMENTARY/SKIN: NEGATIVE for worrisome rashes, moles or lesions  EYES: NEGATIVE for vision changes or irritation  ENT/MOUTH: NEGATIVE for ear, mouth and throat problems  RESP: NEGATIVE for significant cough or SOB  BREAST: NEGATIVE for masses, tenderness or discharge  CV: NEGATIVE for chest pain, palpitations or peripheral edema  GI: NEGATIVE for nausea, abdominal pain, heartburn, or change in bowel habits  : NEGATIVE for frequency, dysuria, or hematuria  MUSCULOSKELETAL: NEGATIVE for significant arthralgias or myalgia  NEURO: NEGATIVE for weakness, dizziness or paresthesias  ENDOCRINE: NEGATIVE for temperature intolerance, skin/hair changes  HEME: NEGATIVE for bleeding problems  PSYCHIATRIC: NEGATIVE for changes in mood or affect    Objective    /84 (BP Location: Left arm, Patient Position: Sitting, Cuff Size: Adult Regular)   Pulse 90   Temp 96.8  F (36  C) (Temporal)   Resp 20   Wt 59 kg (130 lb)   LMP 08/01/2018 (Approximate)   SpO2 99%   BMI 23.53 kg/m     Estimated body mass index is 23.53 kg/m  as calculated from the following:    Height as of 2/21/24: 1.583 m (5' 2.32\").    Weight as of this encounter: 59 kg (130 lb).  Physical Exam  GENERAL: alert and no distress  EYES: Eyes grossly normal to inspection, PERRL and conjunctivae and sclerae normal  HENT: ear canals and TM's normal, nose and mouth without ulcers or lesions  NECK: no adenopathy, no asymmetry, masses, or scars  RESP: lungs clear to auscultation - no rales, rhonchi or wheezes  CV: regular rate and rhythm, normal S1 S2, no S3 or S4, no murmur, click or rub, no peripheral edema  ABDOMEN: soft, nontender, no hepatosplenomegaly, no masses and bowel sounds normal  MS: no gross musculoskeletal defects noted, no edema  NEURO: Normal strength and " tone, mentation intact and speech normal  PSYCH: mentation appears normal, affect normal/bright    Recent Labs   Lab Test 10/31/23  0836   HGB 13.9         POTASSIUM 3.4   CR 0.72        Diagnostics  No labs were ordered during this visit.   No EKG required, no history of coronary heart disease, significant arrhythmia, peripheral arterial disease or other structural heart disease.    Revised Cardiac Risk Index (RCRI)  The patient has the following serious cardiovascular risks for perioperative complications:   - No serious cardiac risks = 0 points     RCRI Interpretation: 0 points: Class I (very low risk - 0.4% complication rate)         Signed Electronically by: Ana Maria Cortez MD  A copy of this evaluation report is provided to the requesting physician.

## 2024-10-02 RX ORDER — UBIDECARENONE 75 MG
100 CAPSULE ORAL DAILY
COMMUNITY

## 2024-10-02 RX ORDER — MUPIROCIN 20 MG/G
OINTMENT TOPICAL 3 TIMES DAILY
COMMUNITY

## 2024-10-02 NOTE — PROGRESS NOTES
PTA medications updated by Medication Scribe prior to surgery via phone call with patient (last doses completed by Nurse)     Medication history sources: Patient, Surescripts, and H&P  In the past week, patient estimated taking medication this percent of the time: Greater than 90%      Significant changes made to the medication list:  None      Additional medication history information:   Patient was advised to bring: mupirocin ointment    Medication reconciliation completed by provider prior to medication history? No    Time spent in this activity: 30 minutes    The information provided in this note is only as accurate as the sources available at the time of update(s)      Prior to Admission medications    Medication Sig Last Dose Taking? Auth Provider Long Term End Date   cholecalciferol 25 MCG (1000 UT) TABS Take 1,000 Units by mouth 09/26/2024 at am Yes Reported, Patient     cyanocobalamin (VITAMIN B-12) 100 MCG tablet Take 100 mcg by mouth daily. 09/26/2024 at am Yes Reported, Patient     levETIRAcetam (KEPPRA) 500 MG tablet TAKE 1 TABLET BY MOUTH TWICE A DAY 10/07/2024 at am Yes Reported, Patient Yes    magnesium 250 MG tablet Take 1 tablet by mouth daily 09/26/2024 at am Yes Reported, Patient No    mupirocin (BACTROBAN) 2 % external ointment Apply topically 3 times daily.  Yes Reported, Patient     tamoxifen (NOLVADEX) 20 MG tablet Take 0.5 tablets (10 mg) by mouth daily. 10/07/2024 at am Yes Victorina Cardoza APRN CNP Yes    vitamin C (ASCORBIC ACID) 500 MG tablet Take 500 mg by mouth 09/26/2024 at am Yes Reported, Patient         Medication history completed by: Ann Contreras

## 2024-10-04 NOTE — PROGRESS NOTES
I called Brittney and discussed that we need to cancel her surgery for Monday due to the fluid shortage. Laura will call to help her reschedule.     Naomi Lewis MD  [facility]  [phone]

## 2024-10-15 ENCOUNTER — TELEPHONE (OUTPATIENT)
Dept: FAMILY MEDICINE | Facility: CLINIC | Age: 49
End: 2024-10-15
Payer: COMMERCIAL

## 2024-10-15 NOTE — TELEPHONE ENCOUNTER
CW,   Dr. Naomi Martínez's office called.   Patient's surgery was moved from 10/7/24 to 10/30/24 due to the IV shortage.   Patient had pre-op w/ you on 24.   Surgeon's office was wondering if you can addend pre-op notes to clear her for 10/30/24 since pre-op will be .    TCs:  No need to call office/fax notes will be available on Epic.    Thanks!  Jessica HENRIQUEZ

## 2024-10-24 NOTE — PROGRESS NOTES
PTA medications updated by Medication Scribe prior to surgery via phone call with patient (last doses completed by Nurse)     Medication history sources: Patient, Surescripts, and H&P  In the past week, patient estimated taking medication this percent of the time: Greater than 90%      Significant changes made to the medication list:  None      Additional medication history information:   None    Medication reconciliation completed by provider prior to medication history? No    Time spent in this activity: 30 minutes    The information provided in this note is only as accurate as the sources available at the time of update(s)      Prior to Admission medications    Medication Sig Last Dose Taking? Auth Provider Long Term End Date   cholecalciferol 25 MCG (1000 UT) TABS Take 1,000 Units by mouth 09/26/2024 at am Yes Reported, Patient     cyanocobalamin (VITAMIN B-12) 100 MCG tablet Take 100 mcg by mouth daily. 09/26/2024 at am Yes Reported, Patient     levETIRAcetam (KEPPRA) 500 MG tablet TAKE 1 TABLET BY MOUTH TWICE A DAY  Yes Reported, Patient Yes    magnesium 250 MG tablet Take 1 tablet by mouth daily 09/26/2024 at am Yes Reported, Patient No    mupirocin (BACTROBAN) 2 % external ointment Apply topically 3 times daily.  Yes Reported, Patient     tamoxifen (NOLVADEX) 20 MG tablet Take 0.5 tablets (10 mg) by mouth daily.  Yes Victorina Cardoza APRN CNP Yes    vitamin C (ASCORBIC ACID) 500 MG tablet Take 500 mg by mouth 09/26/2024 at am Yes Reported, Patient         Medication history completed by: Ann Contreras

## 2024-10-30 ENCOUNTER — ANESTHESIA EVENT (OUTPATIENT)
Dept: SURGERY | Facility: CLINIC | Age: 49
End: 2024-10-30
Payer: COMMERCIAL

## 2024-10-30 ENCOUNTER — APPOINTMENT (OUTPATIENT)
Dept: SURGERY | Facility: PHYSICIAN GROUP | Age: 49
End: 2024-10-30
Payer: COMMERCIAL

## 2024-10-30 ENCOUNTER — ANESTHESIA (OUTPATIENT)
Dept: SURGERY | Facility: CLINIC | Age: 49
End: 2024-10-30
Payer: COMMERCIAL

## 2024-10-30 ENCOUNTER — APPOINTMENT (OUTPATIENT)
Dept: GENERAL RADIOLOGY | Facility: CLINIC | Age: 49
End: 2024-10-30
Attending: PATHOLOGY
Payer: COMMERCIAL

## 2024-10-30 ENCOUNTER — HOSPITAL ENCOUNTER (OUTPATIENT)
Facility: CLINIC | Age: 49
Discharge: HOME | End: 2024-10-31
Attending: SURGERY | Admitting: SURGERY
Payer: COMMERCIAL

## 2024-10-30 DIAGNOSIS — Z90.13 S/P MASTECTOMY, BILATERAL: Primary | ICD-10-CM

## 2024-10-30 PROCEDURE — 250N000011 HC RX IP 250 OP 636

## 2024-10-30 PROCEDURE — 250N000009 HC RX 250: Performed by: SURGERY

## 2024-10-30 PROCEDURE — L8699 PROSTHETIC IMPLANT NOS: HCPCS | Performed by: SURGERY

## 2024-10-30 PROCEDURE — 19303 MAST SIMPLE COMPLETE: CPT | Performed by: ANESTHESIOLOGY

## 2024-10-30 PROCEDURE — 360N000076 HC SURGERY LEVEL 3, PER MIN: Performed by: SURGERY

## 2024-10-30 PROCEDURE — 250N000025 HC SEVOFLURANE, PER MIN: Performed by: SURGERY

## 2024-10-30 PROCEDURE — 370N000017 HC ANESTHESIA TECHNICAL FEE, PER MIN: Performed by: SURGERY

## 2024-10-30 PROCEDURE — 250N000011 HC RX IP 250 OP 636: Performed by: ANESTHESIOLOGY

## 2024-10-30 PROCEDURE — 250N000011 HC RX IP 250 OP 636: Performed by: NURSE ANESTHETIST, CERTIFIED REGISTERED

## 2024-10-30 PROCEDURE — 250N000009 HC RX 250: Performed by: NURSE ANESTHETIST, CERTIFIED REGISTERED

## 2024-10-30 PROCEDURE — 88342 IMHCHEM/IMCYTCHM 1ST ANTB: CPT | Mod: TC | Performed by: SURGERY

## 2024-10-30 PROCEDURE — 258N000003 HC RX IP 258 OP 636: Performed by: NURSE ANESTHETIST, CERTIFIED REGISTERED

## 2024-10-30 PROCEDURE — 19303 MAST SIMPLE COMPLETE: CPT | Performed by: NURSE ANESTHETIST, CERTIFIED REGISTERED

## 2024-10-30 PROCEDURE — 76098 X-RAY EXAM SURGICAL SPECIMEN: CPT | Mod: TC

## 2024-10-30 PROCEDURE — 19303 MAST SIMPLE COMPLETE: CPT | Mod: AS | Performed by: PHYSICIAN ASSISTANT

## 2024-10-30 PROCEDURE — 272N000001 HC OR GENERAL SUPPLY STERILE: Performed by: SURGERY

## 2024-10-30 PROCEDURE — 258N000001 HC RX 258: Performed by: SURGERY

## 2024-10-30 PROCEDURE — 250N000013 HC RX MED GY IP 250 OP 250 PS 637: Performed by: PHYSICIAN ASSISTANT

## 2024-10-30 PROCEDURE — 250N000013 HC RX MED GY IP 250 OP 250 PS 637

## 2024-10-30 PROCEDURE — 19303 MAST SIMPLE COMPLETE: CPT | Mod: 50 | Performed by: SURGERY

## 2024-10-30 PROCEDURE — 250N000011 HC RX IP 250 OP 636: Performed by: REGISTERED NURSE

## 2024-10-30 PROCEDURE — 999N000141 HC STATISTIC PRE-PROCEDURE NURSING ASSESSMENT: Performed by: SURGERY

## 2024-10-30 PROCEDURE — 710N000009 HC RECOVERY PHASE 1, LEVEL 1, PER MIN: Performed by: SURGERY

## 2024-10-30 PROCEDURE — P9045 ALBUMIN (HUMAN), 5%, 250 ML: HCPCS | Performed by: NURSE ANESTHETIST, CERTIFIED REGISTERED

## 2024-10-30 DEVICE — GRAFT ALLODERM 16X20CM  1518320P CHARGE PER SQ CM= 320 UNITS: Type: IMPLANTABLE DEVICE | Site: BREAST | Status: FUNCTIONAL

## 2024-10-30 DEVICE — BREAST TISSUE EXPANDER, SUTURE TABS, INTEGRAL INJECTION DOME, 450CC
Type: IMPLANTABLE DEVICE | Site: BREAST | Status: FUNCTIONAL
Brand: MENTOR CPX4 PLUS, SMOOTH, MEDIUM HEIGHT

## 2024-10-30 RX ORDER — SENNA AND DOCUSATE SODIUM 50; 8.6 MG/1; MG/1
1-2 TABLET, FILM COATED ORAL 2 TIMES DAILY PRN
Qty: 20 TABLET | Refills: 0 | Status: SHIPPED | OUTPATIENT
Start: 2024-10-30

## 2024-10-30 RX ORDER — METHOCARBAMOL 750 MG/1
750 TABLET, FILM COATED ORAL 4 TIMES DAILY PRN
Qty: 20 TABLET | Refills: 0 | Status: SHIPPED | OUTPATIENT
Start: 2024-10-30

## 2024-10-30 RX ORDER — DEXMEDETOMIDINE HYDROCHLORIDE 4 UG/ML
INJECTION, SOLUTION INTRAVENOUS PRN
Status: DISCONTINUED | OUTPATIENT
Start: 2024-10-30 | End: 2024-10-30

## 2024-10-30 RX ORDER — CEFAZOLIN SODIUM/WATER 2 G/20 ML
2 SYRINGE (ML) INTRAVENOUS SEE ADMIN INSTRUCTIONS
Status: DISCONTINUED | OUTPATIENT
Start: 2024-10-30 | End: 2024-10-30 | Stop reason: HOSPADM

## 2024-10-30 RX ORDER — MAGNESIUM HYDROXIDE 1200 MG/15ML
LIQUID ORAL PRN
Status: DISCONTINUED | OUTPATIENT
Start: 2024-10-30 | End: 2024-10-30 | Stop reason: HOSPADM

## 2024-10-30 RX ORDER — TAMOXIFEN CITRATE 10 MG/1
10 TABLET ORAL DAILY
Status: DISCONTINUED | OUTPATIENT
Start: 2024-10-31 | End: 2024-10-31

## 2024-10-30 RX ORDER — DEXAMETHASONE SODIUM PHOSPHATE 4 MG/ML
4 INJECTION, SOLUTION INTRA-ARTICULAR; INTRALESIONAL; INTRAMUSCULAR; INTRAVENOUS; SOFT TISSUE
Status: DISCONTINUED | OUTPATIENT
Start: 2024-10-30 | End: 2024-10-30 | Stop reason: HOSPADM

## 2024-10-30 RX ORDER — CELECOXIB 200 MG/1
200 CAPSULE ORAL ONCE
Status: COMPLETED | OUTPATIENT
Start: 2024-10-30 | End: 2024-10-30

## 2024-10-30 RX ORDER — HYDROMORPHONE HCL IN WATER/PF 6 MG/30 ML
0.4 PATIENT CONTROLLED ANALGESIA SYRINGE INTRAVENOUS
Status: DISCONTINUED | OUTPATIENT
Start: 2024-10-30 | End: 2024-10-31 | Stop reason: HOSPADM

## 2024-10-30 RX ORDER — LEVETIRACETAM 500 MG/1
500 TABLET ORAL 2 TIMES DAILY
Status: DISCONTINUED | OUTPATIENT
Start: 2024-10-30 | End: 2024-10-31 | Stop reason: HOSPADM

## 2024-10-30 RX ORDER — FENTANYL CITRATE 0.05 MG/ML
50 INJECTION, SOLUTION INTRAMUSCULAR; INTRAVENOUS EVERY 5 MIN PRN
Status: DISCONTINUED | OUTPATIENT
Start: 2024-10-30 | End: 2024-10-30 | Stop reason: HOSPADM

## 2024-10-30 RX ORDER — FENTANYL CITRATE 50 UG/ML
INJECTION, SOLUTION INTRAMUSCULAR; INTRAVENOUS PRN
Status: DISCONTINUED | OUTPATIENT
Start: 2024-10-30 | End: 2024-10-30

## 2024-10-30 RX ORDER — LIDOCAINE 40 MG/G
CREAM TOPICAL
Status: DISCONTINUED | OUTPATIENT
Start: 2024-10-30 | End: 2024-10-31 | Stop reason: HOSPADM

## 2024-10-30 RX ORDER — NALOXONE HYDROCHLORIDE 0.4 MG/ML
0.4 INJECTION, SOLUTION INTRAMUSCULAR; INTRAVENOUS; SUBCUTANEOUS
Status: DISCONTINUED | OUTPATIENT
Start: 2024-10-30 | End: 2024-10-31 | Stop reason: HOSPADM

## 2024-10-30 RX ORDER — ONDANSETRON 2 MG/ML
4 INJECTION INTRAMUSCULAR; INTRAVENOUS EVERY 30 MIN PRN
Status: DISCONTINUED | OUTPATIENT
Start: 2024-10-30 | End: 2024-10-30 | Stop reason: HOSPADM

## 2024-10-30 RX ORDER — OXYCODONE HYDROCHLORIDE 5 MG/1
5 TABLET ORAL EVERY 4 HOURS PRN
Status: DISCONTINUED | OUTPATIENT
Start: 2024-10-30 | End: 2024-10-31 | Stop reason: HOSPADM

## 2024-10-30 RX ORDER — CEPHALEXIN 500 MG/1
500 CAPSULE ORAL EVERY 6 HOURS SCHEDULED
Status: DISCONTINUED | OUTPATIENT
Start: 2024-10-31 | End: 2024-10-31 | Stop reason: HOSPADM

## 2024-10-30 RX ORDER — FENTANYL CITRATE 0.05 MG/ML
25 INJECTION, SOLUTION INTRAMUSCULAR; INTRAVENOUS EVERY 5 MIN PRN
Status: DISCONTINUED | OUTPATIENT
Start: 2024-10-30 | End: 2024-10-30 | Stop reason: HOSPADM

## 2024-10-30 RX ORDER — ONDANSETRON 4 MG/1
4 TABLET, ORALLY DISINTEGRATING ORAL EVERY 30 MIN PRN
Status: DISCONTINUED | OUTPATIENT
Start: 2024-10-30 | End: 2024-10-30 | Stop reason: HOSPADM

## 2024-10-30 RX ORDER — ONDANSETRON 2 MG/ML
INJECTION INTRAMUSCULAR; INTRAVENOUS PRN
Status: DISCONTINUED | OUTPATIENT
Start: 2024-10-30 | End: 2024-10-30

## 2024-10-30 RX ORDER — GABAPENTIN 300 MG/1
300 CAPSULE ORAL 3 TIMES DAILY
Status: DISCONTINUED | OUTPATIENT
Start: 2024-10-30 | End: 2024-10-30 | Stop reason: HOSPADM

## 2024-10-30 RX ORDER — HYDROMORPHONE HCL IN WATER/PF 6 MG/30 ML
0.4 PATIENT CONTROLLED ANALGESIA SYRINGE INTRAVENOUS EVERY 5 MIN PRN
Status: DISCONTINUED | OUTPATIENT
Start: 2024-10-30 | End: 2024-10-30 | Stop reason: HOSPADM

## 2024-10-30 RX ORDER — DEXAMETHASONE SODIUM PHOSPHATE 4 MG/ML
INJECTION, SOLUTION INTRA-ARTICULAR; INTRALESIONAL; INTRAMUSCULAR; INTRAVENOUS; SOFT TISSUE PRN
Status: DISCONTINUED | OUTPATIENT
Start: 2024-10-30 | End: 2024-10-30

## 2024-10-30 RX ORDER — INDOCYANINE GREEN AND WATER 25 MG
KIT INJECTION PRN
Status: DISCONTINUED | OUTPATIENT
Start: 2024-10-30 | End: 2024-10-30

## 2024-10-30 RX ORDER — CEFAZOLIN SODIUM/WATER 2 G/20 ML
2 SYRINGE (ML) INTRAVENOUS
Status: COMPLETED | OUTPATIENT
Start: 2024-10-30 | End: 2024-10-30

## 2024-10-30 RX ORDER — SODIUM CHLORIDE, SODIUM LACTATE, POTASSIUM CHLORIDE, CALCIUM CHLORIDE 600; 310; 30; 20 MG/100ML; MG/100ML; MG/100ML; MG/100ML
INJECTION, SOLUTION INTRAVENOUS CONTINUOUS
Status: DISCONTINUED | OUTPATIENT
Start: 2024-10-30 | End: 2024-10-30 | Stop reason: HOSPADM

## 2024-10-30 RX ORDER — GLYCINE 1.5 G/100ML
SOLUTION IRRIGATION PRN
Status: DISCONTINUED | OUTPATIENT
Start: 2024-10-30 | End: 2024-10-30 | Stop reason: HOSPADM

## 2024-10-30 RX ORDER — HYDROMORPHONE HCL IN WATER/PF 6 MG/30 ML
0.2 PATIENT CONTROLLED ANALGESIA SYRINGE INTRAVENOUS EVERY 5 MIN PRN
Status: DISCONTINUED | OUTPATIENT
Start: 2024-10-30 | End: 2024-10-30 | Stop reason: HOSPADM

## 2024-10-30 RX ORDER — ACETAMINOPHEN 325 MG/1
975 TABLET ORAL 4 TIMES DAILY
Status: DISCONTINUED | OUTPATIENT
Start: 2024-10-30 | End: 2024-10-31 | Stop reason: HOSPADM

## 2024-10-30 RX ORDER — NALOXONE HYDROCHLORIDE 0.4 MG/ML
0.1 INJECTION, SOLUTION INTRAMUSCULAR; INTRAVENOUS; SUBCUTANEOUS
Status: DISCONTINUED | OUTPATIENT
Start: 2024-10-30 | End: 2024-10-30 | Stop reason: HOSPADM

## 2024-10-30 RX ORDER — LIDOCAINE HYDROCHLORIDE 20 MG/ML
INJECTION, SOLUTION INFILTRATION; PERINEURAL PRN
Status: DISCONTINUED | OUTPATIENT
Start: 2024-10-30 | End: 2024-10-30

## 2024-10-30 RX ORDER — OXYCODONE HYDROCHLORIDE 5 MG/1
10 TABLET ORAL EVERY 4 HOURS PRN
Status: DISCONTINUED | OUTPATIENT
Start: 2024-10-30 | End: 2024-10-31 | Stop reason: HOSPADM

## 2024-10-30 RX ORDER — LIDOCAINE 40 MG/G
CREAM TOPICAL
Status: DISCONTINUED | OUTPATIENT
Start: 2024-10-30 | End: 2024-10-30 | Stop reason: HOSPADM

## 2024-10-30 RX ORDER — NALOXONE HYDROCHLORIDE 0.4 MG/ML
0.2 INJECTION, SOLUTION INTRAMUSCULAR; INTRAVENOUS; SUBCUTANEOUS
Status: DISCONTINUED | OUTPATIENT
Start: 2024-10-30 | End: 2024-10-31 | Stop reason: HOSPADM

## 2024-10-30 RX ORDER — CELECOXIB 100 MG/1
200 CAPSULE ORAL 2 TIMES DAILY
Status: DISCONTINUED | OUTPATIENT
Start: 2024-10-30 | End: 2024-10-31 | Stop reason: HOSPADM

## 2024-10-30 RX ORDER — ACETAMINOPHEN 650 MG
TABLET, EXTENDED RELEASE ORAL PRN
Status: DISCONTINUED | OUTPATIENT
Start: 2024-10-30 | End: 2024-10-30 | Stop reason: HOSPADM

## 2024-10-30 RX ORDER — METHOCARBAMOL 750 MG/1
750 TABLET, FILM COATED ORAL 4 TIMES DAILY
Status: DISCONTINUED | OUTPATIENT
Start: 2024-10-30 | End: 2024-10-31 | Stop reason: HOSPADM

## 2024-10-30 RX ORDER — PROPOFOL 10 MG/ML
INJECTION, EMULSION INTRAVENOUS PRN
Status: DISCONTINUED | OUTPATIENT
Start: 2024-10-30 | End: 2024-10-30

## 2024-10-30 RX ORDER — CEFAZOLIN SODIUM 1 G/3ML
1 INJECTION, POWDER, FOR SOLUTION INTRAMUSCULAR; INTRAVENOUS ONCE
Status: COMPLETED | OUTPATIENT
Start: 2024-10-30 | End: 2024-10-30

## 2024-10-30 RX ORDER — HYDROXYZINE HYDROCHLORIDE 25 MG/1
25 TABLET, FILM COATED ORAL EVERY 6 HOURS PRN
Status: DISCONTINUED | OUTPATIENT
Start: 2024-10-30 | End: 2024-10-31 | Stop reason: HOSPADM

## 2024-10-30 RX ORDER — APREPITANT 40 MG/1
40 CAPSULE ORAL ONCE
Status: COMPLETED | OUTPATIENT
Start: 2024-10-30 | End: 2024-10-30

## 2024-10-30 RX ORDER — ACETAMINOPHEN 160 MG
TABLET,DISINTEGRATING ORAL PRN
Status: DISCONTINUED | OUTPATIENT
Start: 2024-10-30 | End: 2024-10-30 | Stop reason: HOSPADM

## 2024-10-30 RX ORDER — PROPOFOL 10 MG/ML
INJECTION, EMULSION INTRAVENOUS CONTINUOUS PRN
Status: DISCONTINUED | OUTPATIENT
Start: 2024-10-30 | End: 2024-10-30

## 2024-10-30 RX ORDER — HYDROMORPHONE HCL IN WATER/PF 6 MG/30 ML
0.2 PATIENT CONTROLLED ANALGESIA SYRINGE INTRAVENOUS
Status: DISCONTINUED | OUTPATIENT
Start: 2024-10-30 | End: 2024-10-31 | Stop reason: HOSPADM

## 2024-10-30 RX ADMIN — HYDROMORPHONE HYDROCHLORIDE 0.5 MG: 1 INJECTION, SOLUTION INTRAMUSCULAR; INTRAVENOUS; SUBCUTANEOUS at 13:33

## 2024-10-30 RX ADMIN — DEXMEDETOMIDINE HYDROCHLORIDE 8 MCG: 200 INJECTION INTRAVENOUS at 12:31

## 2024-10-30 RX ADMIN — PHENYLEPHRINE HYDROCHLORIDE 100 MCG: 10 INJECTION INTRAVENOUS at 12:41

## 2024-10-30 RX ADMIN — DEXAMETHASONE SODIUM PHOSPHATE 4 MG: 4 INJECTION, SOLUTION INTRA-ARTICULAR; INTRALESIONAL; INTRAMUSCULAR; INTRAVENOUS; SOFT TISSUE at 12:00

## 2024-10-30 RX ADMIN — APREPITANT 40 MG: 40 CAPSULE ORAL at 10:47

## 2024-10-30 RX ADMIN — PHENYLEPHRINE HYDROCHLORIDE 100 MCG: 10 INJECTION INTRAVENOUS at 14:19

## 2024-10-30 RX ADMIN — FENTANYL CITRATE 50 MCG: 50 INJECTION INTRAMUSCULAR; INTRAVENOUS at 12:15

## 2024-10-30 RX ADMIN — ROCURONIUM BROMIDE 20 MG: 50 INJECTION, SOLUTION INTRAVENOUS at 12:59

## 2024-10-30 RX ADMIN — METHOCARBAMOL 750 MG: 750 TABLET ORAL at 22:11

## 2024-10-30 RX ADMIN — HYDROXYZINE HYDROCHLORIDE 25 MG: 25 TABLET, FILM COATED ORAL at 19:47

## 2024-10-30 RX ADMIN — TRANEXAMIC ACID 1 G: 1 INJECTION, SOLUTION INTRAVENOUS at 13:34

## 2024-10-30 RX ADMIN — FENTANYL CITRATE 50 MCG: 50 INJECTION INTRAMUSCULAR; INTRAVENOUS at 11:55

## 2024-10-30 RX ADMIN — OXYCODONE HYDROCHLORIDE 10 MG: 5 TABLET ORAL at 19:47

## 2024-10-30 RX ADMIN — ROCURONIUM BROMIDE 10 MG: 50 INJECTION, SOLUTION INTRAVENOUS at 13:33

## 2024-10-30 RX ADMIN — ROCURONIUM BROMIDE 40 MG: 50 INJECTION, SOLUTION INTRAVENOUS at 11:55

## 2024-10-30 RX ADMIN — ROCURONIUM BROMIDE 20 MG: 50 INJECTION, SOLUTION INTRAVENOUS at 12:19

## 2024-10-30 RX ADMIN — ALBUMIN (HUMAN): 12.5 SOLUTION INTRAVENOUS at 11:49

## 2024-10-30 RX ADMIN — ROCURONIUM BROMIDE 10 MG: 50 INJECTION, SOLUTION INTRAVENOUS at 13:16

## 2024-10-30 RX ADMIN — GABAPENTIN 300 MG: 300 CAPSULE ORAL at 10:37

## 2024-10-30 RX ADMIN — FENTANYL CITRATE 25 MCG: 50 INJECTION, SOLUTION INTRAMUSCULAR; INTRAVENOUS at 15:35

## 2024-10-30 RX ADMIN — CELECOXIB 200 MG: 200 CAPSULE ORAL at 10:38

## 2024-10-30 RX ADMIN — ACETAMINOPHEN 975 MG: 325 TABLET, FILM COATED ORAL at 22:10

## 2024-10-30 RX ADMIN — LIDOCAINE HYDROCHLORIDE 100 MG: 20 INJECTION, SOLUTION INFILTRATION; PERINEURAL at 11:55

## 2024-10-30 RX ADMIN — LEVETIRACETAM 500 MG: 500 TABLET, FILM COATED ORAL at 22:11

## 2024-10-30 RX ADMIN — Medication 2 G: at 11:53

## 2024-10-30 RX ADMIN — FENTANYL CITRATE 50 MCG: 50 INJECTION, SOLUTION INTRAMUSCULAR; INTRAVENOUS at 14:52

## 2024-10-30 RX ADMIN — ACETAMINOPHEN 975 MG: 325 TABLET, FILM COATED ORAL at 17:16

## 2024-10-30 RX ADMIN — FENTANYL CITRATE 25 MCG: 50 INJECTION, SOLUTION INTRAMUSCULAR; INTRAVENOUS at 15:16

## 2024-10-30 RX ADMIN — CELECOXIB 200 MG: 100 CAPSULE ORAL at 23:54

## 2024-10-30 RX ADMIN — INDOCYANINE GREEN AND WATER 7.5 MG: KIT at 13:37

## 2024-10-30 RX ADMIN — DEXMEDETOMIDINE HYDROCHLORIDE 4 MCG: 200 INJECTION INTRAVENOUS at 13:16

## 2024-10-30 RX ADMIN — CEFAZOLIN 1 G: 1 INJECTION, POWDER, FOR SOLUTION INTRAMUSCULAR; INTRAVENOUS at 22:03

## 2024-10-30 RX ADMIN — METHOCARBAMOL 750 MG: 750 TABLET ORAL at 17:16

## 2024-10-30 RX ADMIN — DEXMEDETOMIDINE HYDROCHLORIDE 4 MCG: 200 INJECTION INTRAVENOUS at 13:14

## 2024-10-30 RX ADMIN — PROPOFOL 50 MCG/KG/MIN: 10 INJECTION, EMULSION INTRAVENOUS at 11:55

## 2024-10-30 RX ADMIN — DEXMEDETOMIDINE HYDROCHLORIDE 4 MCG: 200 INJECTION INTRAVENOUS at 13:33

## 2024-10-30 RX ADMIN — PROPOFOL 200 MG: 10 INJECTION, EMULSION INTRAVENOUS at 11:55

## 2024-10-30 RX ADMIN — PHENYLEPHRINE HYDROCHLORIDE 100 MCG: 10 INJECTION INTRAVENOUS at 13:40

## 2024-10-30 RX ADMIN — Medication 200 MG: at 14:34

## 2024-10-30 RX ADMIN — ONDANSETRON 4 MG: 2 INJECTION INTRAMUSCULAR; INTRAVENOUS at 14:24

## 2024-10-30 ASSESSMENT — LIFESTYLE VARIABLES: TOBACCO_USE: 0

## 2024-10-30 ASSESSMENT — ACTIVITIES OF DAILY LIVING (ADL)
ADLS_ACUITY_SCORE: 0

## 2024-10-30 ASSESSMENT — ENCOUNTER SYMPTOMS
DYSRHYTHMIAS: 0
SEIZURES: 1

## 2024-10-30 NOTE — OP NOTE
Date of Service: 10/30/24    PREOPERATIVE DIAGNOSES:   1. Increased risk for breast cancer  2. Acquired absence of bilateral breasts    POSTOPERATIVE DIAGNOSES:   same    PROCEDURES:     1.  Bilateral first stage breast reconstruction with tissue expanders placed in the prepectoral plane  2.  Acellular dermal matrix (Alloderm) placement to bilateral breasts  3.  Laser angiography of mastectomy skin flaps      SURGEON: Darci Son MD     ASSISTANT: Roxanna Longo PA-c    A first assistant was medically necessary to assist in patient positioning, exposure and visualization of anatomic structures, assistance with hemostasis, and suture closure of the incisions.    ANESTHESIA: General anesthesia    COMPLICATIONS: None.     ESTIMATED BLOOD LOSS: 10 cc    DISPOSITION: To the Post Anesthesia Care Unit in stable condition.    TUBES AND DRAINS: brandon x 2    COUNTS: Correct     SPECIMENS: None    IMPLANTS:     1. On the right : [facility] CPX4 Plus, SCPX-127MH, SN [id number] filled to 275 cc  2. On the left : [facility] CPX4 Plus, SCPX-127MH, SN [id number] filled to 275 cc    INDICATIONS:    This is a 49 year old  yo female who has an increase risk for breast cancer and has elected for bilateral prophylactic mastectomies.  She has elected for an implant-based reconstruction using tissue expanders and possibly acellular dermal matrix at the first stage.  She has requested prepectoral placement if possible.    We discussed details, risks, benefits, and alternatives of the procedure. She understands limitations and risks including bleeding, hematoma, or seroma that could require surgical evacuation. We discussed tissue expander complications including rupture or deflation, infection or extrusion, rippling or wrinkling, capsular contracture, or any known, suspected, or yet to be determined links between systemic disease and implants. She understands there could be a need for unplanned surgical revision. We discussed there is no  guarantee of cure or protection from benign or malignant breast or skin disease. She voiced understanding, signed consent, elected to proceed.    DESCRIPTION OF PROCEDURE:    She was marked preoperatively in the upright position with a transverse ellipse, skin sparing pattern. She was taken to the operating room.  I came in the room at the completion of the mastectomies. The right breast specimen weighed 360 g and the left breast specimen weighed 415 g.  A timeout was performed to ensure the correct orientation and procedure.    First, laser angiography was performed.  The anesthesia staff injected 3 cc of the indocyanine green followed by a 10 cc bolus of normal saline.  Using the [employer] system, the skin flaps were evaluated for 90 seconds over 300 cm2 of skin.  Skin flap perfusion indices appeared viable on both sides.  Based on these findings, I decided to proceed with prepectoral reconstruction.    I began on the right side.  I inspected the pocket and ensured hemostasis.  I irrigated with dilute peroxide and Irrisept.   I brought a tissue expander of appropriate base width onto the back field.  It was filled with 275 ml of sterile injectable saline and rinsed in a 50/50 mixture of Irrisept and Betadine.  I brought a piece of 16 x 20 cm acellular dermal matrix onto the field.  It was rinsed in a 50/50 mixture of Irrisept and Betadine.  The acellular dermal matrix was placed into the pocket with the smooth side down and it was secured superiorly and inferiorly with 2-0 vicryl sutures.  The tissue expander was placed into the prepectoral pocket under the acellular dermal matrix and it was secured using the foot plates and 2-0 silk sutures.  The acellular dermal matrix was used to support the lateral aspect of the device by securing it with 2-0 silk sutures.  A dogear of the excess acellular dermal matrix was removed superiorly.  The resulting defect in the mesh was closed with a running 2-0 vicryl suture.   A  15-[place] round channel drain was placed laterally and secured with a 2-0 silk.  The skin was closed without tension using deep dermal 3-0 Monocryl and running 4-0 Monocryl subcuticular stitching.      I turned my attention to the left side.  I inspected the pocket and ensured hemostasis.  I irrigated with dilute peroxide and Irrisept.   I brought a tissue expander of appropriate base width onto the back field.  It was filled with 275 ml of sterile injectable saline and rinsed in a 50/50 mixture of Irrisept and Betadine.  I brought a piece of 16 x 20 cm acellular dermal matrix onto the field.  It was rinsed in a 50/50 mixture of Irrisept and Betadine.  The acellular dermal matrix was placed into the pocket with the smooth side down and it was secured superiorly and inferiorly with 2-0 vicryl sutures.  The tissue expander was placed into the prepectoral pocket under the acellular dermal matrix and it was secured using the foot plates and 2-0 silk sutures.  The acellular dermal matrix was used to support the lateral aspect of the device by securing it with 2-0 silk sutures.  A dogear of the excess acellular dermal matrix was removed superiorly.  The resulting defect in the mesh was closed with a running 2-0 vicryl suture.   A 15-[place] round channel drain was placed laterally and secured with a 2-0 silk.  The skin was closed without tension using deep dermal 3-0 Monocryl and running 4-0 Monocryl subcuticular stitching.     Steri-Strips were applied to the incisions.  Tegaderms were used to secure a Biopatch over each drain site.  Sterile Kerlix gauze was placed over the breasts. She was wrapped comfortably with a double 6-inch Ace bandage, awoken from anesthesia, and taken to the PACU in stable condition.

## 2024-10-30 NOTE — OR NURSING
Hand-off report given to RN.  To 2212-1 per cart with all belongings.  Will transport with Capnography monitoring.  , Jamison notified of transfer.

## 2024-10-30 NOTE — OR NURSING
Transfer criteria met.  Dr. Brooks at bedside to assess; order received to transfer to [facility] for continued recovery.  Awaiting availability of RN for hand-off report.

## 2024-10-30 NOTE — ANESTHESIA PROCEDURE NOTES
Pectoralis Procedure Note    Pre-Procedure   Staff -        Anesthesiologist:  Luz Brooks MD       Performed By: anesthesiologist       Location: pre-op       Pre-Anesthestic Checklist: patient identified, IV checked, site marked, risks and benefits discussed, informed consent, monitors and equipment checked, pre-op evaluation, at physician/surgeon's request and post-op pain management  Timeout:       Correct Patient: Yes        Correct Procedure: Yes        Correct Site: Yes        Correct Position: Yes        Correct Laterality: Yes        Site Marked: Yes  Procedure Documentation  Procedure: Pectoralis             Pectoralis I and Pectoralis II       Laterality: bilateral       Patient Position: sitting       Skin prep: Betadine       Local skin infiltrated with 1 mL of 1% lidocaine.        Needle Type: insulated       Needle Gauge: 21.        Needle Length (Inches): 3.13        Ultrasound guided       1. Ultrasound was used to identify targeted nerve, plexus, vascular marker, or fascial plane and place a needle adjacent to it in real-time.       2. Ultrasound was used to visualize the spread of anesthetic in close proximity to the above referenced structure.       3. A permanent image is entered into the patient's record.       4. The visualized anatomic structures appeared normal.       5. There were no apparent abnormal pathologic findings.    Assessment/Narrative         The placement was negative for: blood aspirated, painful injection and site bleeding       Paresthesias: No.       Bolus given via needle. no blood aspirated via catheter.        Secured via.        Insertion/Infusion Method: Single Shot    Medication(s) Administered   Bupivacaine 0.25% w/ 1:400K Epi (Injection) - Injection   60 mL - 10/30/2024 12:11:00 PM   Comments:  Bolus via needle, 30 ml of 0.5% bupivacaine with 1:400,000 epinephrine on the left followed by 30 ml of 0.5% bupivacaine with 1:400,000 epinephrine on the right  Patient  "tolerated well, was mildly sedated but communicative throughout the procedure.   The surgeon has given a verbal order transferring care of this patient to me for the performance of regional analgesia block for post op pain control. It is requested of me because I am uniquely trained and qualified to perform this block and the surgeon is neither trained nor qualified to perform this procedure.         FOR [facility] ([facility]) ONLY:   Pain Team Contact information: please page the Pain Team Via [employer]. Search \"Pain\". During daytime hours, please page the attending first. At night please page the resident first.      "

## 2024-10-30 NOTE — ANESTHESIA PREPROCEDURE EVALUATION
Anesthesia Pre-Procedure Evaluation    Patient: Brittney Morris   MRN: [id number] : 1975        Procedure : Procedure(s):  bilateral prophylactic mastectomies  Bilateral First Stage Breast Reconstruction with Tissue Expander Placement; Possible Acellular Dermal Matrix          Past Medical History:   Diagnosis Date    Anxiety     Female stress incontinence     Hx of heartburn     Hyperlipidemia     Leiomyoma of uterus     Missed ab     Seizures (H) 1985    Seizure as a child. Took meds for 2 years.    Syncope     UTI (urinary tract infection)       Past Surgical History:   Procedure Laterality Date    BIOPSY      BIOPSY BREAST SEED LOCALIZATION Right 2018    Procedure: BIOPSY BREAST SEED LOCALIZATION;  RIGHT SEED LOCALIZED BREAST BIOPSY ;  Surgeon: Naomi Lewis MD;  Location: [facility]    BREAST SURGERY      COLONOSCOPY      DILATION AND CURETTAGE SUCTION  2012    Procedure: DILATION AND CURETTAGE SUCTION;  SUCTION DILATION AND CURETTAGE;  Surgeon: Nargis Gordon MD;  Location: [facility]    HYSTERECTOMY, PAP NO LONGER INDICATED      cervix/uterus removed, ovaries remain (unsure tubes)    wisdom teeth[        No Known Allergies   Social History     Tobacco Use    Smoking status: Never    Smokeless tobacco: Never   Substance Use Topics    Alcohol use: Yes     Comment: occas      Wt Readings from Last 1 Encounters:   10/30/24 59.4 kg (131 lb)        Anesthesia Evaluation    Type: General.        ROS/MED HX  ENT/Pulmonary:    (-) tobacco use, asthma and sleep apnea   Neurologic:     (+)       seizures,                      (-) no CVA and migraines   Cardiovascular:     (+) Dyslipidemia - -   -  - -                                   (-) hypertension, CAD, KING, arrhythmias, valvular problems/murmurs and murmur   METS/Exercise Tolerance:     Hematologic:    (-) history of blood clots and anemia   Musculoskeletal:    (-) arthritis   GI/Hepatic:     (+) GERD,                (-) liver  "disease   Renal/Genitourinary: Comment: Stress incontinence   (-) renal disease and nephrolithiasis   Endo:    (-) Type I DM, Type II DM, thyroid disease and obesity   Psychiatric/Substance Use:     (+) psychiatric history anxiety       Infectious Disease:    (-) Recent Fever   Malignancy:       Other:            Physical Exam    Airway        Mallampati: II   TM distance: > 3 FB   Neck ROM: full   Mouth opening: > 3 cm    Respiratory Devices and Support         Dental       (+) Minor Abnormalities - some fillings, tiny chips      Cardiovascular          Rhythm and rate: regular and normal (-) no murmur    Pulmonary           breath sounds clear to auscultation           OUTSIDE LABS:  CBC:   Lab Results   Component Value Date    WBC 4.4 10/31/2023    WBC 6.0 09/11/2020    HGB 13.9 10/31/2023    HGB 12.9 09/11/2020    HCT 41.7 10/31/2023    HCT 38.6 09/11/2020     10/31/2023     09/11/2020     BMP:   Lab Results   Component Value Date     10/31/2023     09/11/2020    POTASSIUM 3.4 10/31/2023    POTASSIUM 3.6 09/11/2020    CHLORIDE 103 10/31/2023    CHLORIDE 105 09/11/2020    CO2 27 10/31/2023    CO2 28 09/11/2020    BUN 7.6 10/31/2023    BUN 11 09/11/2020    CR 0.72 10/31/2023    CR 0.63 09/11/2020    GLC 97 10/31/2023    GLC 98 11/11/2022     COAGS: No results found for: \"PTT\", \"INR\", \"FIBR\"  POC:   Lab Results   Component Value Date    HCG Negative 08/22/2018    HCGS Negative 09/11/2020     HEPATIC:   Lab Results   Component Value Date    ALBUMIN 4.4 10/31/2023    PROTTOTAL 7.1 10/31/2023    ALT 25 10/31/2023    AST 25 10/31/2023    ALKPHOS 75 10/31/2023    BILITOTAL 0.5 10/31/2023     OTHER:   Lab Results   Component Value Date    CESAR 9.2 10/31/2023    TSH 3.13 11/11/2022       Anesthesia Plan    ASA Status:  2       Anesthesia Type: General.     - Airway: ETT   Induction: Propofol.   Maintenance: Balanced.        Consents    Anesthesia Plan(s) and associated risks, benefits, and " realistic alternatives discussed. Questions answered and patient/representative(s) expressed understanding.     - Discussed:     - Discussed with:  Patient            Postoperative Care    Pain management: Peripheral nerve block (Single Shot).   PONV prophylaxis: Ondansetron (or other 5HT-3), Dexamethasone or Solumedrol, Background Propofol Infusion, Aprepitant     Comments:               Luz Brooks MD    I have reviewed the pertinent notes and labs in the chart from the past 30 days and (re)examined the patient.  Any updates or changes from those notes are reflected in this note.

## 2024-10-30 NOTE — ANESTHESIA POSTPROCEDURE EVALUATION
Patient: Brittney Morris    Procedure: Procedure(s):  bilateral prophylactic mastectomies  Bilateral First Stage Breast Reconstruction with Tissue Expander Placement; Acellular Dermal Matrix       Anesthesia Type:  General    Note:  Disposition: Inpatient   Postop Pain Control: Uneventful            Sign Out: Well controlled pain   PONV: No   Neuro/Psych: Uneventful            Sign Out: Acceptable/Baseline neuro status   Airway/Respiratory: Uneventful            Sign Out: Acceptable/Baseline resp. status   CV/Hemodynamics: Uneventful            Sign Out: Acceptable CV status; No obvious hypovolemia; No obvious fluid overload   Other NRE: NONE   DID A NON-ROUTINE EVENT OCCUR? No           Last vitals:  Vitals Value Taken Time   /70 10/30/24 1545   Temp 36.7  C (98  F) 10/30/24 1515   Pulse 75 10/30/24 1545   Resp 14 10/30/24 1530   SpO2 89 % 10/30/24 1554   Vitals shown include unfiled device data.    Electronically Signed By: Luz Brooks MD  October 30, 2024  3:55 PM

## 2024-10-30 NOTE — PLAN OF CARE
Goal Outcome Evaluation:bilateral prophylactic mastectomies  Bilateral First Stage Breast Reconstruction with Tissue Expander Placement; Acellular Dermal Matrix             Orientation: A/O x4 calm/pleasant, moderate general discomforts, otherwise short of breath, chest tightness, nausea, vomiting and fever     Vitals: vss on RA     IV Access/drains: PIV SL     Diet : regular     Mobility: SBA     GI/Gu continent  with good output , 500 ml urine output     Wound/Skin: WDL  except bilateral mastectomies, PREET    Consults: none     Discharge Plan: pending       See Flow sheets for assessment

## 2024-10-30 NOTE — ANESTHESIA PROCEDURE NOTES
Airway       Patient location during procedure: OR       Procedure Start/Stop Times: 10/30/2024 11:59 AM  Staff -        CRNA: Rachna Goel APRN CRNA       Performed By: CRNAIndications and Patient Condition       Indications for airway management: aman-procedural       Induction type:intravenous       Mask difficulty assessment: 1 - vent by mask    Final Airway Details       Final airway type: endotracheal airway       Successful airway: ETT - single  Endotracheal Airway Details        ETT size (mm): 7.0       Cuffed: yes       Successful intubation technique: direct laryngoscopy       DL Blade Type: MAC 3       Grade View of Cords: 1       Adjucts: stylet       Position: Right       Measured from: lips       Secured at (cm): 19       Bite block used: None    Post intubation assessment        Placement verified by: capnometry, equal breath sounds and chest rise        Number of attempts at approach: 1       Number of other approaches attempted: 0       Secured with: tape       Ease of procedure: easy       Dentition: Intact and Unchanged    Medication(s) Administered   Medication Administration Time: 10/30/2024 11:59 AM

## 2024-10-30 NOTE — ANESTHESIA CARE TRANSFER NOTE
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    Anesthesia Type:   General     Note:    Oropharynx: oropharynx clear of all foreign objects  Level of Consciousness: awake  Oxygen Supplementation: face mask  Level of Supplemental Oxygen (L/min / FiO2): 8  Independent Airway: airway patency satisfactory and stable  Dentition: dentition unchanged  Vital Signs Stable: post-procedure vital signs reviewed and stable  Report to RN Given: handoff report given  Patient transferred to: PACU    Handoff Report: Identifed the Patient, Identified the Reponsible Provider, Reviewed the pertinent medical history, Discussed the surgical course, Reviewed Intra-OP anesthesia mangement and issues during anesthesia, Set expectations for post-procedure period and Allowed opportunity for questions and acknowledgement of understanding      Vitals:  Vitals Value Taken Time   /59    Temp 36    Pulse 80    Resp 14    SpO2 99 % 10/30/24 1442   Vitals shown include unfiled device data.    Electronically Signed By: KUSH Haynes CRNA  October 30, 2024  2:44 PM

## 2024-10-30 NOTE — OP NOTE
[facility] Operative Note    PREOPERATIVE DIAGNOSIS:  high risk breast cancer, h/o ALH, FEA    POSTOPERATIVE DIAGNOSIS:  same, pathology pending    PROCEDURE:    1. Bilateral skin sparing mastectomies  2. Reconstruction per Dr. Son, please see their operative report for details regarding their portion of the procedure.     SURGEON:  Naomi Lewis MD    ASSISTANT:  Suyapa Berkowitz PA-C  The physician s assistant was medically necessary for their expertise in retraction and suctioning.    ANESTHESIA:  General.    BLOOD LOSS: 25ml    INDICATIONS:   Please see my clinic notes for details.     DETAILS OF PROCEDURE:     The patient was taken to the operating room and placed in supine position and general anesthesia by endotracheal tube.   Perioperative antiobiotics were given. SCDs were placed on the lower extremities. . Bilateral PECS blocks were performed by anesthesia.   The patient was also marked by Dr. Son with skin sparing periareolar ellipse   incisions.     The breasts and axilla were prepped with chloraprep and draped in the usual sterile fashion.  The timeout procedure was performed.      Starting on the left side, a periareolar skin sparing incision was made following the pre operative markings with a #10 scalpel blade and deepened with electrocautery.  The superior flap was raised with electrocautery up to the top edge of the breast tissue just under the clavicle.  The inferior flap was similarly raised using the cautery down to the inframammary fold.  Flaps were raised medially to the lateral aspect of the sternum and laterally to the lateral chest wall. The breast tissue was then elevated off of the pectoralis fascia with cautery. The fascia was excised with the breast tissue.  Once the breast was removed, it was oriented with sutures with a short suture superior and long suture lateral.  The breast was sent to pathology to be placed in formalin and have routine pathology exam.    The left side was irrigated, hemostasis assured and packed with a saline-wetted lap pad and covered with a dry towel while attention was turned to the right breast.     A similar  incision was made on the right side with a #10 scalpel blade and deepened with electrocautery.  Again, the flaps were raised with electrocautery and the breast was dissected away from the pectoralis fascia.  The tissue was oriented with a short suture superior and long suture lateral.  The breast was then sent to pathology and placed in formalin for permanent section.        Dr. Son then performed their portion of the procedure with reconstruction. Please see their operative report for details.     Naomi Lewis MD

## 2024-10-30 NOTE — DISCHARGE INSTRUCTIONS
[facility]  Discharge Instructions: Post-Operative Mastectomy with Reconstruction    ACTIVITY  Take frequent, short walks and increase your activity gradually.    Avoid strenuous physical activity or heavy lifting greater than 10 pounds. You may climb stairs.   Gentle rotation and stretching of your arms and shoulders will prevent joint stiffness.  You may drive without restrictions when you are not using any prescription pain medication and feel comfortable in a car.  You may return to work/school when you are comfortable without any prescription pain medication.    DRAIN CARE  You have drains at your surgical site.  In order to empty these, open the tab/vent on the drain and record how much liquid you remove. To properly close the drain, squeeze the bulb (with tab/vent open) then close the tab while still squeezing the bulb. You should notice that liquid moves in the tubing toward the bulb if it is properly closed.   You should also strip the drain tubing once daily to avoid any clogging. You do this by gently pinching the drain, starting near the skin, and stretching the tubing out this way until you reach the bulb. Do not pull hard on the drain tubing to the point of pulling the drain away from the skin.   Record your drain output daily.    DIET  Start with liquids, then gradually resume your regular diet as tolerated.   Drink plenty of fluids to stay hydrated.    PAIN  Expect some tenderness and discomfort at the incision site(s).  Use the prescribed pain medication at your discretion.  Expect gradual resolution of your pain over several days.  If you are taking Norco, do not take any additional acetaminophen/APAP/Tylenol.  Do not drink alcohol or drive while you are taking pain medications.  You may apply ice to your incisions in 20 minute intervals as needed.    EXPECTATIONS  Pain medications can cause constipation.  Limit use when possible.  Take the prescribed stool  softener/stimulant twice daily as needed. You may take a mild over the counter laxative, such as Miralax or a Dulcolax suppository, as needed.   You may discontinue these medications once you are having regular bowel movements and/or are no longer taking your narcotic pain medication.     RETURN APPOINTMENT  Follow-up with Dr. Son's office next week as directed.  Follow-up with Dr. Lewis in approximately 2-4 weeks for postop check. Call our office at [phone] to schedule this appointment. Our clinic's name [facility]. The address is [address], [facility] [address], [place], [place], [address]     CALL DR SON'S OFFICE IF YOU HAVE:   Chills or fever above 101 F.  Increased redness, warmth, or drainage at your incisions.  Significant bleeding or swelling.  Pain not relieved by your pain medication or rest.  Increasing pain after the first 48 hours.  Any other concerns or questions.

## 2024-10-30 NOTE — BRIEF OP NOTE
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 Lewis's portion    Specimens:   ID Type Source Tests Collected by Time Destination   1 : Left breast mastectomy, short stitch superior, long stitch lateral Tissue Breast, Left SURGICAL PATHOLOGY EXAM Naomi Lewis MD 10/30/2024 12:49 PM    2 : Right breast masectomy, Short stitch superior, long stitch lateral Tissue Breast, Right SURGICAL PATHOLOGY EXAM Naomi Lewis MD 10/30/2024  1:25 PM      Findings:  As above. No immediate complications. See operative report for full details. Dr. Son to follow with immediate reconstruction.         Suyapa Berkowitz PA-C  [facility]  [phone]

## 2024-10-31 VITALS
RESPIRATION RATE: 14 BRPM | TEMPERATURE: 98 F | HEIGHT: 63 IN | SYSTOLIC BLOOD PRESSURE: 129 MMHG | DIASTOLIC BLOOD PRESSURE: 76 MMHG | BODY MASS INDEX: 23.21 KG/M2 | HEART RATE: 75 BPM | OXYGEN SATURATION: 99 % | WEIGHT: 131 LBS

## 2024-10-31 LAB
CREAT SERPL-MCNC: 0.74 MG/DL (ref 0.51–0.95)
EGFRCR SERPLBLD CKD-EPI 2021: >90 ML/MIN/1.73M2
GLUCOSE BLDC GLUCOMTR-MCNC: 99 MG/DL (ref 70–99)

## 2024-10-31 PROCEDURE — 36415 COLL VENOUS BLD VENIPUNCTURE: CPT | Performed by: SURGERY

## 2024-10-31 PROCEDURE — 250N000013 HC RX MED GY IP 250 OP 250 PS 637: Performed by: PHYSICIAN ASSISTANT

## 2024-10-31 PROCEDURE — 250N000013 HC RX MED GY IP 250 OP 250 PS 637

## 2024-10-31 PROCEDURE — 82565 ASSAY OF CREATININE: CPT | Performed by: SURGERY

## 2024-10-31 PROCEDURE — 250N000013 HC RX MED GY IP 250 OP 250 PS 637: Performed by: SURGERY

## 2024-10-31 PROCEDURE — 82962 GLUCOSE BLOOD TEST: CPT

## 2024-10-31 RX ORDER — OXYCODONE HYDROCHLORIDE 5 MG/1
5-10 TABLET ORAL EVERY 6 HOURS PRN
Qty: 20 TABLET | Refills: 0 | Status: SHIPPED | OUTPATIENT
Start: 2024-10-31 | End: 2024-11-03

## 2024-10-31 RX ORDER — CEPHALEXIN 500 MG/1
500 CAPSULE ORAL 4 TIMES DAILY
Qty: 30 CAPSULE | Refills: 0 | Status: SHIPPED | OUTPATIENT
Start: 2024-10-31

## 2024-10-31 RX ORDER — AMOXICILLIN 250 MG
1 CAPSULE ORAL 2 TIMES DAILY
Status: DISCONTINUED | OUTPATIENT
Start: 2024-10-31 | End: 2024-10-31 | Stop reason: HOSPADM

## 2024-10-31 RX ORDER — CELECOXIB 200 MG/1
200 CAPSULE ORAL 2 TIMES DAILY PRN
Qty: 20 CAPSULE | Refills: 0 | Status: SHIPPED | OUTPATIENT
Start: 2024-10-31

## 2024-10-31 RX ORDER — POLYETHYLENE GLYCOL 3350 17 G/17G
17 POWDER, FOR SOLUTION ORAL DAILY PRN
Status: DISCONTINUED | OUTPATIENT
Start: 2024-10-31 | End: 2024-10-31 | Stop reason: HOSPADM

## 2024-10-31 RX ADMIN — CEPHALEXIN 500 MG: 500 CAPSULE ORAL at 10:23

## 2024-10-31 RX ADMIN — ACETAMINOPHEN 975 MG: 325 TABLET, FILM COATED ORAL at 07:51

## 2024-10-31 RX ADMIN — CELECOXIB 200 MG: 100 CAPSULE ORAL at 07:51

## 2024-10-31 RX ADMIN — OXYCODONE HYDROCHLORIDE 10 MG: 5 TABLET ORAL at 05:13

## 2024-10-31 RX ADMIN — SENNOSIDES AND DOCUSATE SODIUM 1 TABLET: 50; 8.6 TABLET ORAL at 07:52

## 2024-10-31 RX ADMIN — METHOCARBAMOL 750 MG: 750 TABLET ORAL at 11:31

## 2024-10-31 RX ADMIN — OXYCODONE HYDROCHLORIDE 5 MG: 5 TABLET ORAL at 00:04

## 2024-10-31 RX ADMIN — METHOCARBAMOL 750 MG: 750 TABLET ORAL at 07:51

## 2024-10-31 RX ADMIN — ACETAMINOPHEN 975 MG: 325 TABLET, FILM COATED ORAL at 11:31

## 2024-10-31 RX ADMIN — LEVETIRACETAM 500 MG: 500 TABLET, FILM COATED ORAL at 07:51

## 2024-10-31 RX ADMIN — CEPHALEXIN 500 MG: 500 CAPSULE ORAL at 05:13

## 2024-10-31 RX ADMIN — OXYCODONE HYDROCHLORIDE 5 MG: 5 TABLET ORAL at 09:10

## 2024-10-31 ASSESSMENT — ACTIVITIES OF DAILY LIVING (ADL)
ADLS_ACUITY_SCORE: 0

## 2024-10-31 NOTE — PROGRESS NOTES
"Plastic Surgery POD#1    Patient seen sitting up in bed. Reports uneventful night. Some discomfort, well controlled with PO meds. Ambulating. Voiding. Tolerating PO diet. Denies N/V.    /65 (BP Location: Right arm, Cuff Size: Adult Regular)   Pulse 57   Temp 97.4  F (36.3  C) (Axillary)   Resp 16   Ht 1.6 m (5' 3\")   Wt 59.4 kg (131 lb)   LMP 08/01/2018 (Approximate)   SpO2 97%   BMI 23.21 kg/m    Incisions cdi with steri-strips in place. No erythema or signs of infection. No fluid collections. Drain outputs serosang. Volumes ok.    Plan:  Discussed surgical outcomes.  Discharge home today. Discussed discharge instructions and follow-up.  Regular diet. Ambulate as tolerated. Encourage IS use.  Pain management with PO meds.  Patient may change out of ACE and into mastectomy camisole prior to discharge.    "

## 2024-10-31 NOTE — PLAN OF CARE
Summary:      Date & Time: 10/30-31 4406-9721   Surgery/POD#: POD0/1 bilateral prophylactic mastectomies  Behavior & Aggression: Green   Fall Risk: No   Orientation: A&O x 4  ABNL VS/O2: VSS on RA   ABNL Labs: see chart   Pain Management: Moderate pain managed w/ PRN oxy and scheduled Tylenol - ice packs applied to chest   Bowel/Bladder: Voiding adequately, passing gas - No BM this shift. Continent of B/B.   Drains: PIV SL; bilateral BRENDAN drains - bloody red output   Wounds/incisions: R mastectomy site w/ gauze cdi and L mastectomy incision site cdi   Diet: Regular - tolerating   Activity Level: SBA  Tests/Procedures: na  Anticipated  DC Date: Today - will go home w/ BRENDAN drains   Significant Information: Stripped BRENDAN drains q4hrs.

## 2024-10-31 NOTE — PROGRESS NOTES
"[facility]  General Surgery Progress Note    Admission Date: 10/30/2024  10/31/2024         Assessment and Plan:     Brittney Morris is a 49 year old female 1 day S/P:  Bilateral prophylactic mastectomies by Dr. Lewis  Bilateral First Stage Breast Reconstruction with Tissue Expander Placement; Acellular Dermal Matrix by Dr. Son    - Pain controlled with scheduled tylenol, robaxin, and prn oxycodone.  - Tolerating diet. Senokot BID, prn miralax.  - Ambulate 4x day and encourage IS.  - BRENDAN drain teaching by nursing prior to discharge.    DISPOSITION:  - Discharge home today with robaxin and senokot. Oxycodone and antibiotic per Plastics.  - Follow up with Dr. Son next week and when drains are ready to be removed.  - Follow up with Dr. Lewis in 2-4 weeks. She will call when pathology results are available.  - Discharge instructions and incision cares per Plastic Surgery.              Interval History:     Brittney Morris is seen on surgical rounds. Pain is controlled with oral analgesia but worse this morning than overnight. She is tolerating diet without nausea but not eating much yet. Passing flatus. Urinating without difficulty, ambulating. Vitals wnl. Meds reviewed.                    Physical Exam:   Blood pressure 109/65, pulse 57, temperature 97.4  F (36.3  C), temperature source Axillary, resp. rate 16, height 1.6 m (5' 3\"), weight 59.4 kg (131 lb), last menstrual period 2018, SpO2 97%, not currently breastfeeding.  Temperature Temp  Av.7  F (36.5  C)  Min: 96.3  F (35.7  C)  Max: 98.2  F (36.8  C)   I/O last 3 completed shifts:  In: 1140 [P.O.:860; I.V.:30]  Out: 1251 [Urine:950; Drains:266; Blood:35]    GENERAL: VS reviewed, alert, oriented, no acute distress  LUNGS: Normal respiratory effort, no wheezing  ABDOMEN:  Soft, non-distended  CHEST: Check beneath ACE, no palpable seroma/hematoma. Healthy flaps with some ecchymosis inferior right flap. BRENDAN " drains intact without leakage, serosanguinous. Left- 128 ml since placement, Right- 138 ml since placement  INCISION: Dressing in place without saturation. No surrounding erythema.  EXTREMITIES: Moving all extremities, no gross deformities, well perfused, no lower extremity edema or tenderness  NEUROLOGICAL: Grossly non-focal, mood & affect appropriate         Data:     Recent Labs   Lab Test 10/31/23  0836 09/11/20  1134 05/11/18  1822   WBC 4.4 6.0 8.1   HGB 13.9 12.9 12.6   HCT 41.7 38.6 38.0    207 189      Recent Labs   Lab Test 10/31/23  0836 09/11/20  1134 05/11/18  1822   POTASSIUM 3.4 3.6 4.0   CHLORIDE 103 105 104   CO2 27 28 24   BUN 7.6 11 15   CR 0.72 0.63 0.63                    No lab results found.     Rosi Knapp PA-C  [facility]    Please use Vocera to page 7:30am-4pm, page on-call surgeon after 4pm  Office number: [phone]

## 2024-10-31 NOTE — PLAN OF CARE
Date & Time: 10/31 7349-2811  Surgery/POD#: POD0/1 bilateral prophylactic mastectomies.    Behavior & Aggression: Green   Fall Risk: No   Orientation: A&O x 4  ABNL VS/O2: VSS on RA   ABNL Labs: None  Pain Management: Moderate pain managed w/ PRN oxy and scheduled Tylenol - ice packs applied to chest   Bowel/Bladder: Voiding adequately, passing gas - No BM this shift. Continent of B/B.   Drains: PIV SL; bilateral BRENDAN drains - bloody red output   Wounds/incisions: R mastectomy site w/ gauze cdi and L mastectomy incision site cdi   Diet: Regular - tolerating   Activity Level: SBA  Tests/Procedures: na  Anticipated  DC Date: Today - will go home w/ BRENDAN drains.    Significant Information: Stripped BRENDAN drains q4hrs.     Pt. Discharged home with .  Pt. And  given medications and discharge paperwork.   shown how to strip & empty BRENDAN drains.

## 2024-11-04 ENCOUNTER — TELEPHONE (OUTPATIENT)
Dept: SURGERY | Facility: CLINIC | Age: 49
End: 2024-11-04

## 2024-11-04 LAB
PATH REPORT.COMMENTS IMP SPEC: NORMAL
PATH REPORT.FINAL DX SPEC: NORMAL
PATH REPORT.GROSS SPEC: NORMAL
PATH REPORT.MICROSCOPIC SPEC OTHER STN: NORMAL
PATH REPORT.MICROSCOPIC SPEC OTHER STN: NORMAL
PATH REPORT.RELEVANT HX SPEC: NORMAL
PHOTO IMAGE: NORMAL

## 2024-11-04 PROCEDURE — 88341 IMHCHEM/IMCYTCHM EA ADD ANTB: CPT | Mod: 26 | Performed by: PATHOLOGY

## 2024-11-04 PROCEDURE — 88342 IMHCHEM/IMCYTCHM 1ST ANTB: CPT | Mod: 26 | Performed by: PATHOLOGY

## 2024-11-04 PROCEDURE — 88307 TISSUE EXAM BY PATHOLOGIST: CPT | Mod: 26 | Performed by: PATHOLOGY

## 2024-11-04 NOTE — TELEPHONE ENCOUNTER
Name of caller: Patient    Reason for Call:    Schedule her 2 week post-op appointment.  I didn't see anything available.    Surgeon:  Naomi Lewis MD    Recent Surgery:  Yes.    If yes, when & what type:      *CASE STARTING @ 12:05PM* *OUTPATIENT IN A BED* BILATERAL PROPHYLACTIC MASTECTOMY, DR GARCIA TO FOLLOW WITH RECONSTRUCTION; GENERAL ANESTHESIA; H&P W/DR JOSEF MAJOR--ADDENDED ON 10/15/24; 1ST ASSIST VICKY FAITH/otoniel 10/15     Best phone number to reach pt at is:   [phone]      Ok to leave a message with medical info? Yes.    Pharmacy preferred (if calling for a refill):

## 2024-11-13 NOTE — TELEPHONE ENCOUNTER
Brittney calling back to see if the 11/19 12:00 time works? She never heard anything back?  If it works she is good with a my chart message. If not she would like a call back to figure out when she can get in.     [phone]  Ok to leave VM

## 2024-11-19 ENCOUNTER — OFFICE VISIT (OUTPATIENT)
Dept: SURGERY | Facility: CLINIC | Age: 49
End: 2024-11-19
Payer: COMMERCIAL

## 2024-11-19 DIAGNOSIS — Z09 SURGICAL FOLLOW-UP CARE: Primary | ICD-10-CM

## 2024-11-19 NOTE — LETTER
November 21, 2024          KUSH Ruiz CNS  No address on file      RE:   Brittney Morris 1975      Dear Colleague,    Thank you for referring your patient, Brittney Morris, to [facility]. Please see a copy of my visit note below.     Brittney reports she is doing well after surgery.  Pain has been improving.  Drains were removed a week or so after surgery.  She does feel fairly tight and has an appointment to work with physical therapy coming up.     Breasts: Bilateral mastectomy incisions are healing well.  No erythema or induration.     Pathology: Reviewed and copy.  Patient     A/P  Brittney Morris is recovering from bilateral prophylactic skin sparing mastectomies on 10/30/2024.  She had immediate reconstruction with Dr. Paez.  Her pathology was benign.  She is recovering very well.  There are no areas of concern on chest exam.  I would like to see her back in 6 months for clinical chest wall exam, sooner with any concerns.    Again, thank you for allowing me to participate in the care of your patient.      Sincerely,      Naomi Lewis MD

## 2024-11-19 NOTE — PROGRESS NOTES
[facility] Breast Surgery Postoperative Note    S: Brittney reports she is doing well after surgery.  Pain has been improving.  Drains were removed a week or so after surgery.  She does feel fairly tight and has an appointment to work with physical therapy coming up.    Breasts: Bilateral mastectomy incisions are healing well.  No erythema or induration.    Pathology: Reviewed and copy.  Patient    A/P  Brittney Morris is recovering from bilateral prophylactic skin sparing mastectomies on 10/30/2024.  She had immediate reconstruction with Dr. Paez.  Her pathology was benign.  She is recovering very well.  There are no areas of concern on chest exam.  I would like to see her back in 6 months for clinical chest wall exam, sooner with any concerns.    Thank you for the opportunity to help in her care.    Naomi Lewis MD  [facility]  [phone]    Please route or send letter to:  Primary Care Provider (PCP) and Referring Provider

## 2024-11-27 ENCOUNTER — THERAPY VISIT (OUTPATIENT)
Dept: PHYSICAL THERAPY | Facility: CLINIC | Age: 49
End: 2024-11-27
Payer: COMMERCIAL

## 2024-11-27 ENCOUNTER — TRANSCRIBE ORDERS (OUTPATIENT)
Dept: OTHER | Age: 49
End: 2024-11-27

## 2024-11-27 DIAGNOSIS — Z98.890 S/P BREAST RECONSTRUCTION: Primary | ICD-10-CM

## 2024-11-27 DIAGNOSIS — Z98.890 S/P BREAST RECONSTRUCTION: ICD-10-CM

## 2024-11-27 PROCEDURE — 97161 PT EVAL LOW COMPLEX 20 MIN: CPT | Mod: GP | Performed by: PHYSICAL THERAPIST

## 2024-11-27 PROCEDURE — 97110 THERAPEUTIC EXERCISES: CPT | Mod: GP | Performed by: PHYSICAL THERAPIST

## 2024-11-27 NOTE — PROGRESS NOTES
PHYSICAL THERAPY EVALUATION  Type of Visit: Evaluation       Fall Risk Screen:  Fall screen completed by: PT  Have you fallen 2 or more times in the past year?: No  Have you fallen and had an injury in the past year?: No  Is patient a fall risk?: No    Subjective   Pt arriving 4 weeks post op from preventative bilateral mastectomy with immediate expander placement.  Has 10 lb lifting restriction. Walks for exercise, has been getting back to more, and light weight lifting. Continues to have discomfort with sleeping positions but daily pain is well controlled.    Scheduled for February 3rd for reconstruction with an implant exchange.         Presenting condition or subjective complaint: (Patient-Rptd) Relieve pain and get stronger  Date of onset: 10/30/24    Relevant medical history:     Dates & types of surgery: (Patient-Rptd) Double mastectomy and hysterectomy    Prior diagnostic imaging/testing results: (Patient-Rptd) Other (Patient-Rptd) recovery from surgery   Prior therapy history for the same diagnosis, illness or injury: (Patient-Rptd) No      Prior Level of Function  Transfers: Independent  Ambulation: Independent  ADL: Independent  IADL: Driving, Finances, Housekeeping, Laundry, Meal preparation, Medication management, Work    Living Environment  Social support: (Patient-Rptd) With a significant other or spouse   Type of home: (Patient-Rptd) House   Stairs to enter the home:         Ramp: (Patient-Rptd) No   Stairs inside the home: (Patient-Rptd) Yes (Patient-Rptd) 10 Is there a railing: (Patient-Rptd) Yes     Help at home: (Patient-Rptd) Other  Equipment owned:       Employment: (Patient-Rptd) 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     Objective      Cognitive Status Examination  Orientation: Oriented to person,  place and time   Level of Consciousness: Alert  Follows Commands and Answers Questions: 100% of the time  Personal Safety and Judgement: Intact  Memory: Intact    OBSERVATION: Calm, pleasant  INTEGUMENTARY:  Surgical incisions to chest wall ; intact drains removed at 2 weeks post op.   Axillary cords present to R axilla, less to left axilla      POSTURE: WFL mild forward head position, rounded shoulders in protective posture  PALPATION: Tender to lateral chest wall. Palpable cords present in axilla R more than L  RANGE OF MOTION:   (Degrees) Left AROM Left PROM Right AROM  Right PROM   Shoulder Flexion 135   (standing 145)  130  (Standing 144)    Shoulder Extension       Shoulder Abduction 85  80        STRENGTH: UE Strength WFL, as expected post operatively 10 lbs lifting restriction in place    BED MOBILITY: Independent    TRANSFERS: Independent    WHEELCHAIR MOBILITY: NA    GAIT:   Level of [place]: WNL  Assistive Device(s): None  Gait Deviations:   Gait Distance: Not limited  Stairs: IND    BALANCE: WNL, not formally tested      Assessment & Plan   CLINICAL IMPRESSIONS  Medical Diagnosis: s/p mastecomy bilateral    Treatment Diagnosis: Impaired shoulder ROM, Axillary cording   Impression/Assessment: Patient is a 49 year old female with pain and impaired ROM complaints.  The following significant findings have been identified: Pain and Decreased ROM/flexibility. These impairments interfere with their ability to perform self care tasks, work tasks, recreational activities, and household chores as compared to previous level of function.     Clinical Decision Making (Complexity):  Clinical Presentation: Stable/Uncomplicated  Clinical Presentation Rationale: based on medical and personal factors listed in PT evaluation  Clinical Decision Making (Complexity): Low complexity    PLAN OF CARE  Treatment Interventions:  Interventions: Manual Therapy, Neuromuscular Re-education, Therapeutic Exercise, Self-Care/Home  Management    Long Term Goals     PT Goal 1  Goal Identifier: HEP  Goal Description: Pt will be independent with Chest and shoulder stretching exercises to decrease pain in chest and prevent injury with return to previous household and leisure activity.  Target Date: 01/26/25  PT Goal 2  Goal Identifier: ROM  Goal Description: Pt will demonstrate pain free active shoulder flexion ROM to at least 170 degrees to allow return to previous functioning.  Target Date: 01/26/25  PT Goal 3  Goal Identifier: SPADI  Goal Description: Pt will score within normal ranges on SPADI to demonstrate no further shoudler disfunction with return to previous functioning  Target Date: 01/26/25      Frequency of Treatment: 1x a week  Duration of Treatment: 90 days    Recommended Referrals to Other Professionals:   Education Assessment:   Learner/Method: Patient;No Barriers to Learning;Demonstration;Pictures/Video  Education Comments: Plan, edema education, home program    Risks and benefits of evaluation/treatment have been explained.   Patient/Family/caregiver agrees with Plan of Care.     Evaluation Time:     PT Eval, Low Complexity Minutes (28571): 15     Signing Clinician: Roxanna Butler, PT

## 2024-12-04 ENCOUNTER — THERAPY VISIT (OUTPATIENT)
Dept: PHYSICAL THERAPY | Facility: CLINIC | Age: 49
End: 2024-12-04
Payer: COMMERCIAL

## 2024-12-04 DIAGNOSIS — Z98.890 S/P BREAST RECONSTRUCTION: Primary | ICD-10-CM

## 2025-01-11 ENCOUNTER — HEALTH MAINTENANCE LETTER (OUTPATIENT)
Age: 50
End: 2025-01-11

## 2025-01-27 ASSESSMENT — ANXIETY QUESTIONNAIRES
7. FEELING AFRAID AS IF SOMETHING AWFUL MIGHT HAPPEN: SEVERAL DAYS
4. TROUBLE RELAXING: NOT AT ALL
7. FEELING AFRAID AS IF SOMETHING AWFUL MIGHT HAPPEN: SEVERAL DAYS
5. BEING SO RESTLESS THAT IT IS HARD TO SIT STILL: NOT AT ALL
3. WORRYING TOO MUCH ABOUT DIFFERENT THINGS: SEVERAL DAYS
GAD7 TOTAL SCORE: 4
1. FEELING NERVOUS, ANXIOUS, OR ON EDGE: SEVERAL DAYS
8. IF YOU CHECKED OFF ANY PROBLEMS, HOW DIFFICULT HAVE THESE MADE IT FOR YOU TO DO YOUR WORK, TAKE CARE OF THINGS AT HOME, OR GET ALONG WITH OTHER PEOPLE?: NOT DIFFICULT AT ALL
GAD7 TOTAL SCORE: 4
2. NOT BEING ABLE TO STOP OR CONTROL WORRYING: SEVERAL DAYS
IF YOU CHECKED OFF ANY PROBLEMS ON THIS QUESTIONNAIRE, HOW DIFFICULT HAVE THESE PROBLEMS MADE IT FOR YOU TO DO YOUR WORK, TAKE CARE OF THINGS AT HOME, OR GET ALONG WITH OTHER PEOPLE: NOT DIFFICULT AT ALL
GAD7 TOTAL SCORE: 4
6. BECOMING EASILY ANNOYED OR IRRITABLE: NOT AT ALL

## 2025-01-27 ASSESSMENT — PATIENT HEALTH QUESTIONNAIRE - PHQ9
SUM OF ALL RESPONSES TO PHQ QUESTIONS 1-9: 0
SUM OF ALL RESPONSES TO PHQ QUESTIONS 1-9: 0

## 2025-01-28 ENCOUNTER — OFFICE VISIT (OUTPATIENT)
Dept: FAMILY MEDICINE | Facility: CLINIC | Age: 50
End: 2025-01-28
Payer: COMMERCIAL

## 2025-01-28 VITALS
HEIGHT: 63 IN | SYSTOLIC BLOOD PRESSURE: 128 MMHG | TEMPERATURE: 98.3 F | HEART RATE: 98 BPM | RESPIRATION RATE: 16 BRPM | WEIGHT: 136.2 LBS | DIASTOLIC BLOOD PRESSURE: 80 MMHG | OXYGEN SATURATION: 100 % | BODY MASS INDEX: 24.13 KG/M2

## 2025-01-28 DIAGNOSIS — Z01.818 PRE-OP EXAM: Primary | ICD-10-CM

## 2025-01-28 DIAGNOSIS — Z90.13 H/O MASTECTOMY, BILATERAL: ICD-10-CM

## 2025-01-28 LAB
ANION GAP SERPL CALCULATED.3IONS-SCNC: 9 MMOL/L (ref 7–15)
BUN SERPL-MCNC: 11.1 MG/DL (ref 6–20)
CALCIUM SERPL-MCNC: 9.6 MG/DL (ref 8.8–10.4)
CHLORIDE SERPL-SCNC: 105 MMOL/L (ref 98–107)
CREAT SERPL-MCNC: 0.68 MG/DL (ref 0.51–0.95)
EGFRCR SERPLBLD CKD-EPI 2021: >90 ML/MIN/1.73M2
ERYTHROCYTE [DISTWIDTH] IN BLOOD BY AUTOMATED COUNT: 12 % (ref 10–15)
GLUCOSE SERPL-MCNC: 104 MG/DL (ref 70–99)
HCO3 SERPL-SCNC: 26 MMOL/L (ref 22–29)
HCT VFR BLD AUTO: 39 % (ref 35–47)
HGB BLD-MCNC: 12.7 G/DL (ref 11.7–15.7)
MCH RBC QN AUTO: 29.1 PG (ref 26.5–33)
MCHC RBC AUTO-ENTMCNC: 32.6 G/DL (ref 31.5–36.5)
MCV RBC AUTO: 89 FL (ref 78–100)
PLATELET # BLD AUTO: 194 10E3/UL (ref 150–450)
POTASSIUM SERPL-SCNC: 3.8 MMOL/L (ref 3.4–5.3)
RBC # BLD AUTO: 4.37 10E6/UL (ref 3.8–5.2)
SODIUM SERPL-SCNC: 140 MMOL/L (ref 135–145)
WBC # BLD AUTO: 5.1 10E3/UL (ref 4–11)

## 2025-01-28 PROCEDURE — 99213 OFFICE O/P EST LOW 20 MIN: CPT | Performed by: FAMILY MEDICINE

## 2025-01-28 PROCEDURE — 36415 COLL VENOUS BLD VENIPUNCTURE: CPT | Performed by: FAMILY MEDICINE

## 2025-01-28 PROCEDURE — 80048 BASIC METABOLIC PNL TOTAL CA: CPT | Performed by: FAMILY MEDICINE

## 2025-01-28 PROCEDURE — 85027 COMPLETE CBC AUTOMATED: CPT | Performed by: FAMILY MEDICINE

## 2025-01-28 ASSESSMENT — PAIN SCALES - GENERAL: PAINLEVEL_OUTOF10: NO PAIN (0)

## 2025-01-28 NOTE — PROGRESS NOTES
Preoperative Evaluation  [facility]  [address], [address]  [facility] [address]  Phone: [phone]  Primary Provider: Ana Maria Cortez MD  Pre-op Performing Provider: Joel Daniel Wegener, MD  Jan 28, 2025 1/27/2025   Surgical Information   What procedure is being done? Breast reconstructive surgery   Facility or Hospital where procedure/surgery will be performed: [facility]   Who is doing the procedure / surgery? Dr. Darci Son   Date of surgery / procedure: 2/3/25   Time of surgery / procedure: 1:30pm   Where do you plan to recover after surgery? at home with family   Pt reports surgery center off [address].    Fax number for surgical facility: not given    Assessment & Plan     The proposed surgical procedure is considered LOW risk.    Pre-op exam  Cleared for procedure.     Do not take any aspirin or over the counter pain medications or supplements or vitamins except for tylenol for 10 days before the procedure since they are blood thinners.     Skip any pills on the morning of the procedure except for the keppra which you can take with a small sip of water.     Consider shingles and pneumonia vaccines after age 50.       - CBC with platelets; Future  - Basic metabolic panel  (Ca, Cl, CO2, Creat, Gluc, K, Na, BUN); Future  - Basic metabolic panel  (Ca, Cl, CO2, Creat, Gluc, K, Na, BUN)  - CBC with platelets    H/O mastectomy, bilateral  Had prophylactic mastectomy and seeking reconstruction now.             - No identified additional risk factors other than previously addressed        Recommendation  Approval given to proceed with proposed procedure, without further diagnostic evaluation.    Gabriele Lugo is a 49 year old, presenting for the following:  Pre-Op Exam          1/28/2025     8:52 AM   Additional Questions   Roomed by KLEVER Damian   Accompanied by N/A     HPI related to upcoming procedure: overall feeling  well.  Looking forward to completing surgery and getting spacers removed!         1/27/2025   Pre-Op Questionnaire   Have you ever had a heart attack or stroke? No   Have you ever had surgery on your heart or blood vessels, such as a stent placement, a coronary artery bypass, or surgery on an artery in your head, neck, heart, or legs? No   Do you have chest pain with activity? No   Do you have a history of heart failure? No   Do you currently have a cold, bronchitis or symptoms of other infection? No   Do you have a cough, shortness of breath, or wheezing? No   Do you or anyone in your family have previous history of blood clots? No   Do you or does anyone in your family have a serious bleeding problem such as prolonged bleeding following surgeries or cuts? No   Have you ever had problems with anemia or been told to take iron pills? No   Have you had any abnormal blood loss such as black, tarry or bloody stools, or abnormal vaginal bleeding? No   Have you ever had a blood transfusion? No   Are you willing to have a blood transfusion if it is medically needed before, during, or after your surgery? Yes   Have you or any of your relatives ever had problems with anesthesia? No   Do you have sleep apnea, excessive snoring or daytime drowsiness? No   Do you have any artifical heart valves or other implanted medical devices like a pacemaker, defibrillator, or continuous glucose monitor? No   Do you have artificial joints? No   Are you allergic to latex? No     Health Care Directive  Patient does not have a Health Care Directive: Discussed advance care planning with patient; information given to patient to review.    Preoperative Review of    reviewed - oxycodone #20 10/31/24          Patient Active Problem List    Diagnosis Date Noted    S/P mastectomy, bilateral 10/30/2024     Priority: Medium    Breast cancer screening, high risk patient 12/05/2023     Priority: Medium     Following with Imani Gallahger, q6mo  mammograms/breast MRIs.   Was on tamoxifem x 6mo in 2019 due to high risk, stopped due to se's of dizziness.      History of heartburn 10/31/2023     Priority: Medium    Conjunctival cyst of both eyes 05/10/2022     Priority: Medium    Myopia of both eyes with astigmatism and presbyopia 05/10/2022     Priority: Medium    Superficial punctate keratitis, left 05/10/2022     Priority: Medium    History of dysplastic nevus 01/31/2022     Priority: Medium     Formatting of this note might be different from the original.  Moderately DN, left labia minora, s/p shave 11/2/2007  Mildly DN, right inferior chest, s/p punch biopsy 8/3/2009  Severely DN with spitzoid features, right upper back, s/p excision 5/11/2011  Formatting of this note might be different from the original.   DN, moderate atypia, left labia minora, s/p shave 2007 (clinical monitoring)   DN, mild atypia, right lower chest, s/p shave 2009   DN, severe atypia with spitzoid features, right upper back, s/p excision 2011      Hyperlipidemia 08/05/2021     Priority: Medium    KVNG (generalized anxiety disorder) 08/04/2021     Priority: Medium     Long-standing anxiety.  Sertraline worked well ~2285-8858 (stopped when it ran out during covid).  Trial of Lexapro in '22- se's of palpitations, improved after stopping.  Trial of sertraline again, but had diarrhea (was also on gerd meds then)      Hemorrhoid 08/04/2021     Priority: Medium    Seizure disorder (H) 08/04/2021     Priority: Medium     Seizure in 4th grade, 2017 at work (possible vasovagal), and one more at home.  EEG okay.  Has a neurologist, Dr. Cintron, [facility].   Keppra, 2018/19  Also takes B12, magnesium, Vit D.      Dysplastic nevi 03/14/2019     Priority: Medium     - Severely atypical nevus with spitzoid features, right upper back, s/p excision 2011  - Mildly dysplastic nevus, right lower chest, 2009  - Moderately dysplastic nevus, left labia minora 2007 (biopsy had positive margins  but had completely healed by time of reexcision and site could not be identified). Clinical monitoring.    Formatting of this note might be different from the original.  - Severely atypical nevus with spitzoid features, right upper back, s/p excision 2011  - Mildly dysplastic nevus, right lower chest, 2009  - Moderately dysplastic nevus, left labia minora 2007 (biopsy had positive margins but had completely healed by time of reexcision and site could not be identified). Clinical monitoring.      Vitamin deficiency 08/21/2018     Priority: Medium     Overview:   Summer 2018 neurologist stated she is low in vitamin D, C, B12 and magnesium    Formatting of this note might be different from the original.  Summer 2018 neurologist stated she is low in vitamin D, C, B12 and magnesium      Syncope 10/10/2017     Priority: Medium    Gastroesophageal reflux disease without esophagitis 07/14/2017     Priority: Medium     Overview:   Rx prn TUMS, ranitidine    Formatting of this note might be different from the original.  Rx prn TUMS, ranitidine      Pain in joint involving pelvic region and thigh 05/12/2015     Priority: Medium    Female stress incontinence 05/12/2015     Priority: Medium    Leiomyoma of uterus 09/29/2008     Priority: Medium     Overview:     ultrasound 9/2008    Formatting of this note might be different from the original.    ultrasound 9/2008      Neoplasm of uncertain behavior of skin 09/24/2008     Priority: Medium     Overview:     left labia minora 2007   Dysplastic Nevus    Formatting of this note might be different from the original.    left labia minora 2007   Dysplastic Nevus        Past Medical History:   Diagnosis Date    Anxiety     Female stress incontinence     Hx of heartburn     Hyperlipidemia     Leiomyoma of uterus     Missed ab     Seizures (H) 1985    Seizure as a child. Took meds for 2 years.    Syncope     UTI (urinary tract infection)      Past Surgical History:   Procedure Laterality  "Date    BIOPSY      BIOPSY BREAST SEED LOCALIZATION Right 08/22/2018    Procedure: BIOPSY BREAST SEED LOCALIZATION;  RIGHT SEED LOCALIZED BREAST BIOPSY ;  Surgeon: Naomi Lewis MD;  Location: [facility]    BREAST SURGERY      COLONOSCOPY      DILATION AND CURETTAGE SUCTION  12/03/2012    Procedure: DILATION AND CURETTAGE SUCTION;  SUCTION DILATION AND CURETTAGE;  Surgeon: Nargis Gordon MD;  Location: [facility]    HYSTERECTOMY, PAP NO LONGER INDICATED      cervix/uterus removed, ovaries remain (unsure tubes)    MASTECTOMY SIMPLE Bilateral 10/30/2024    Procedure: bilateral prophylactic mastectomies;  Surgeon: Naomi Lewis MD;  Location:  OR    RECONSTRUCT BREAST, INSERT TISSUE EXPANDER BILATERAL, COMBINED Bilateral 10/30/2024    Procedure: Bilateral First Stage Breast Reconstruction with Tissue Expander Placement; Acellular Dermal Matrix;  Surgeon: Darci Son MD;  Location:  OR    wisdom teeth[       Current Outpatient Medications   Medication Sig Dispense Refill    levETIRAcetam (KEPPRA) 500 MG tablet TAKE 1 TABLET BY MOUTH TWICE A DAY         No Known Allergies     Social History     Tobacco Use    Smoking status: Never     Passive exposure: Never    Smokeless tobacco: Never   Substance Use Topics    Alcohol use: Yes     Comment: occas     Family History   Problem Relation Age of Onset    Hypertension Mother     Breast Cancer Sister 42    Breast Cancer Maternal Aunt 50    Esophageal Cancer Maternal Uncle 70     History   Drug Use No             Review of Systems  Constitutional, neuro, ENT, endocrine, pulmonary, cardiac, gastrointestinal, genitourinary, musculoskeletal, integument and psychiatric systems are negative, except as otherwise noted.    Objective    /80 (BP Location: Left arm, Patient Position: Sitting, Cuff Size: Adult Regular)   Pulse 98   Temp 98.3  F (36.8  C) (Temporal)   Resp 16   Ht 1.594 m (5' 2.75\")   Wt 61.8 kg (136 lb 3.2 oz)   LMP 08/01/2018 " "(Approximate)   SpO2 100%   BMI 24.32 kg/m     Estimated body mass index is 24.32 kg/m  as calculated from the following:    Height as of this encounter: 1.594 m (5' 2.75\").    Weight as of this encounter: 61.8 kg (136 lb 3.2 oz).  Physical Exam  GENERAL: alert and no distress  NECK: no adenopathy, no asymmetry, masses, or scars  RESP: lungs clear to auscultation - no rales, rhonchi or wheezes  CV: regular rate and rhythm, normal S1 S2, no S3 or S4, no murmur, click or rub, no peripheral edema  ABDOMEN: soft, nontender, no hepatosplenomegaly, no masses and bowel sounds normal  MS: no gross musculoskeletal defects noted, no edema  SKIN: no suspicious lesions or rashes  NEURO: Normal strength and tone, mentation intact and speech normal  PSYCH: mentation appears normal, affect normal/bright    Recent Labs   Lab Test 10/31/24  0746   CR 0.74        Diagnostics  Recent Results (from the past week)   Basic metabolic panel  (Ca, Cl, CO2, Creat, Gluc, K, Na, BUN)    Collection Time: 01/28/25  9:37 AM   Result Value Ref Range    Sodium 140 135 - 145 mmol/L    Potassium 3.8 3.4 - 5.3 mmol/L    Chloride 105 98 - 107 mmol/L    Carbon Dioxide (CO2) 26 22 - 29 mmol/L    Anion Gap 9 7 - 15 mmol/L    Urea Nitrogen 11.1 6.0 - 20.0 mg/dL    Creatinine 0.68 0.51 - 0.95 mg/dL    GFR Estimate >90 >60 mL/min/1.73m2    Calcium 9.6 8.8 - 10.4 mg/dL    Glucose 104 (H) 70 - 99 mg/dL   CBC with platelets    Collection Time: 01/28/25  9:37 AM   Result Value Ref Range    WBC Count 5.1 4.0 - 11.0 10e3/uL    RBC Count 4.37 3.80 - 5.20 10e6/uL    Hemoglobin 12.7 11.7 - 15.7 g/dL    Hematocrit 39.0 35.0 - 47.0 %    MCV 89 78 - 100 fL    MCH 29.1 26.5 - 33.0 pg    MCHC 32.6 31.5 - 36.5 g/dL    RDW 12.0 10.0 - 15.0 %    Platelet Count 194 150 - 450 10e3/uL      No EKG required, no history of coronary heart disease, significant arrhythmia, peripheral arterial disease or other structural heart disease.    Revised Cardiac Risk Index (RCRI)  The " patient has the following serious cardiovascular risks for perioperative complications:   - No serious cardiac risks = 0 points     RCRI Interpretation: 0 points: Class I (very low risk - 0.4% complication rate)         Signed Electronically by: Joel Daniel Wegener, MD  A copy of this evaluation report is provided to the requesting physician.         Answers submitted by the patient for this visit:  Patient Health Questionnaire (Submitted on 1/27/2025)  PHQ9 TOTAL SCORE: 0  Patient Health Questionnaire (G7) (Submitted on 1/27/2025)  KVNG 7 TOTAL SCORE: 4

## 2025-01-28 NOTE — PATIENT INSTRUCTIONS
Cleared for procedure assuming reassuring labs.     Do not take any aspirin or over the counter pain medications or supplements or vitamins except for tylenol for 10 days before the procedure since they are blood thinners.     Skip any pills on the morning of the procedure except for the keppra which you can take with a small sip of water.     Consider shingles and pneumonia vaccines after age 50.

## 2025-04-17 ENCOUNTER — THERAPY VISIT (OUTPATIENT)
Dept: PHYSICAL THERAPY | Facility: CLINIC | Age: 50
End: 2025-04-17
Payer: COMMERCIAL

## 2025-04-17 DIAGNOSIS — Z90.13 S/P MASTECTOMY, BILATERAL: Primary | ICD-10-CM

## 2025-04-17 NOTE — PROGRESS NOTES
PLAN  Pt case open for follow up visits as needed post next stage breast reconstruction/ fat grafting procedures. Pt goals primarily met, but likely need for new assessment and/ or goals post procedure. Plan for assessment as needed post procedure.     Beginning/End Dates of Progress Note Reporting Period:  01/17/2025 to 04/17/2025    Referring Provider:  Darci Son     04/17/25 0500   Appointment Info   Signing clinician's name / credentials Roxanna Butler, PT,CLT-DIA   Visits Used 6   Medical Diagnosis s/p mastecomy bilateral   PT Tx Diagnosis Impaired shoulder ROM, Axillary cording   Progress Note/Certification   Onset of illness/injury or Date of Surgery 10/30/24   Therapy Frequency up to 5 visits   Predicted Duration 90 days   Progress Note Completed Date 04/17/25   GOALS   PT Goals 2;3;4   PT Goal 1   Goal Identifier HEP   Goal Description Pt will be independent with Chest and shoulder stretching exercises to decrease pain in chest and prevent injury with return to previous household and leisure activity.   Target Date 01/26/25   Date Met 01/17/25   PT Goal 2   Goal Identifier ROM   Goal Description Pt will demonstrate pain free active shoulder flexion ROM to at least 170 degrees to allow return to previous functioning.   Target Date 01/26/25   Date Met 01/17/25   PT Goal 3   Goal Identifier SPADI   Goal Description Pt will score within normal ranges on SPADI to demonstrate no further shoudler disfunction with return to previous functioning   Target Date 01/26/25   Date Met 01/17/25   PT Goal 4   Goal Identifier Post surgical ROM   Goal Description Pt will demonstrate shoulder flexion and abduction ROM greater than 165 degrees to allow return to all previous work and leisure activities   Target Date 03/18/25   Date Met 02/28/25   Subjective Report   Subjective Report Met with plastic surgeon will likely do fat grafting for shaping. Got a [employer] Membership has not started yet but hoping to increase  strength and work on weight training   Objective Measures   Objective Measures Objective Measure 1;Objective Measure 2   Objective Measure 1   Objective Measure Olecranon to mat   Details R: 4.5 cm; L 4.5   Objective Measure 2   Objective Measure Shoulder Flexion   Details Supine: R 170 ([id number]); L 162 (overpressure to 170 b with subustitution pattern)   Therapeutic Procedure/Exercise   Therapeutic Procedures: strength, endurance, ROM, flexibility minutes (21641) 24   Ther Proc 1 ROM, HEP   Ther Proc 1 - Details Pt has not done much stretngthening program since last session. Just got a [employer] membership so planning to increase time on strengthening and exercise overall. Review of provided HEP with technique check for each per PTRX program. Questions answered with discussion of progression of strength. Encouraged pt to check on personal training sessions often offered at gym for further exercise progression post and to help with accountability.   Manual Therapy   Manual Therapy: Mobilization, MFR, MLD, friction massage minutes (71602) 26   Manual Therapy 1 STM   Manual Therapy 1 - Details Check on cording and chest wall tightness with focus on LUE. ROM assessment shows improved motion, but slight increase in left scapular substitution for shoulder flexion. STM to posterior shoulder/ scapular areas with TPR with good release. Gentle inferior mobs grade 1 to increase glenohumeral motion.   Patient Response/Progress improved shoulder motion with less scapular winging   Plan   Home program PTRX   Plan for next session As needed post fat grafting procedure   Total Session Time   Timed Code Treatment Minutes 50   Total Treatment Time (sum of timed and untimed services) 50

## 2025-04-29 ENCOUNTER — PATIENT OUTREACH (OUTPATIENT)
Dept: CARE COORDINATION | Facility: CLINIC | Age: 50
End: 2025-04-29
Payer: COMMERCIAL

## 2025-05-13 ENCOUNTER — OFFICE VISIT (OUTPATIENT)
Dept: SURGERY | Facility: CLINIC | Age: 50
End: 2025-05-13
Payer: COMMERCIAL

## 2025-05-13 VITALS
BODY MASS INDEX: 24.84 KG/M2 | SYSTOLIC BLOOD PRESSURE: 126 MMHG | WEIGHT: 135 LBS | DIASTOLIC BLOOD PRESSURE: 62 MMHG | HEIGHT: 62 IN | OXYGEN SATURATION: 98 % | HEART RATE: 85 BPM

## 2025-05-13 DIAGNOSIS — Z90.13 STATUS POST BILATERAL MASTECTOMY: Primary | ICD-10-CM

## 2025-05-13 PROCEDURE — 3074F SYST BP LT 130 MM HG: CPT | Performed by: SURGERY

## 2025-05-13 PROCEDURE — 99212 OFFICE O/P EST SF 10 MIN: CPT | Performed by: SURGERY

## 2025-05-13 PROCEDURE — 3078F DIAST BP <80 MM HG: CPT | Performed by: SURGERY

## 2025-05-13 NOTE — LETTER
May 15, 2025          Ana Maria Cortez MD      RE:   Brittney Morris 1975      Dear Colleague,    Thank you for referring your patient, Brittney Morris, to [facility] [place] [facility]. Please see a copy of my visit note below.     Brittney Morris is a 49 year old female who is seen in follow up for h/o bilateral mastectomies.     She is s/p bilateral prophylactic skin sparing mastectomies on 10/30/2024.  She had immediate reconstruction with Dr. Paez. She had a history of prior radial scar, ALH and FEA. Her lifetime risk was 42.9%.  She is debating if she should proceed with fat grafting.  She had implants placed earlier this year and tolerated the procedure well.  She is overall happy with her size and cosmesis.  She is nervous about additional procedures.     Past Medical History        Past Medical History:   Diagnosis Date    Anxiety      Female stress incontinence      Hx of heartburn      Hyperlipidemia      Leiomyoma of uterus      Missed ab      Seizures (H) 1985     Seizure as a child. Took meds for 2 years.    Syncope      UTI (urinary tract infection)              Past Surgical History         Past Surgical History:   Procedure Laterality Date    BIOPSY        BIOPSY BREAST SEED LOCALIZATION Right 08/22/2018     Procedure: BIOPSY BREAST SEED LOCALIZATION;  RIGHT SEED LOCALIZED BREAST BIOPSY ;  Surgeon: Naomi Lewis MD;  Location: [facility]    BREAST SURGERY        COLONOSCOPY        DILATION AND CURETTAGE SUCTION   12/03/2012     Procedure: DILATION AND CURETTAGE SUCTION;  SUCTION DILATION AND CURETTAGE;  Surgeon: Nargis Gordon MD;  Location: [facility]    HYSTERECTOMY, PAP NO LONGER INDICATED         cervix/uterus removed, ovaries remain (unsure tubes)    MASTECTOMY SIMPLE Bilateral 10/30/2024     Procedure: bilateral prophylactic mastectomies;  Surgeon: Naomi Lewis MD;  Location:  OR    RECONSTRUCT BREAST, INSERT TISSUE  "EXPANDER BILATERAL, COMBINED Bilateral 10/30/2024     Procedure: Bilateral First Stage Breast Reconstruction with Tissue Expander Placement; Acellular Dermal Matrix;  Surgeon: Darci Son MD;  Location: [facility] OR    wisdom teeth[                Family History         Family History   Problem Relation Age of Onset    Hypertension Mother      Breast Cancer Sister 42    Breast Cancer Maternal Aunt 50    Esophageal Cancer Maternal Uncle 70            Social History            Tobacco Use    Smoking status: Never       Passive exposure: Never    Smokeless tobacco: Never   Substance Use Topics    Alcohol use: Yes       Comment: occas             Patient Active Problem List   Diagnosis    Pain in joint involving pelvic region and thigh    Female stress incontinence    Syncope    Dysplastic nevi    Gastroesophageal reflux disease without esophagitis    Leiomyoma of uterus    Neoplasm of uncertain behavior of skin    Vitamin deficiency    KVNG (generalized anxiety disorder)    Hemorrhoid    Hyperlipidemia    Seizure disorder (H)    Conjunctival cyst of both eyes    History of dysplastic nevus    Myopia of both eyes with astigmatism and presbyopia    Superficial punctate keratitis, left    History of heartburn    Breast cancer screening, high risk patient    S/P mastectomy, bilateral      Allergies   No Known Allergies     Current Outpatient Prescriptions          Current Outpatient Medications   Medication Sig Dispense Refill    levETIRAcetam (KEPPRA) 500 MG tablet TAKE 1 TABLET BY MOUTH TWICE A DAY             Vitals: /62   Pulse 85   Ht 1.575 m (5' 2\")   Wt 61.2 kg (135 lb)   LMP 08/01/2018 (Approximate)   SpO2 98%   BMI 24.69 kg/m    BMI= Body mass index is 24.69 kg/m .     EXAM:  GENERAL: healthy, alert and no distress   BREAST: Breasts are surgically absent.  Implants are in place bilaterally.  Well-healed transverse mastectomy scars.  There are no palpable masses on either chest wall.  There is no " axillary or supraclavicular lymphadenopathy.  CARDIOVASCULAR:  RRR  RESPIRATORY: nonlabored breathing  NECK: Neck supple. No adenopathy. Thyroid symmetric, normal size  SKIN: No suspicious lesions or rashes  LYMPH: Normal cervical lymph nodes        ASSESSMENT/PLAN:  Brittney Morris is a 49-year-old female who is status post bilateral mastectomies with immediate reconstruction.  She is doing well.  There are no areas of concern on clinical exam.  We discussed she should have a clinical exam annually for screening done with her primary or myself.          Again, thank you for allowing me to participate in the care of your patient.      Sincerely,      Naomi Lewis MD

## 2025-05-13 NOTE — PROGRESS NOTES
[facility] Follow Up Note    CHIEF COMPLAINT:  H/o bilateral mastectomies.     HISTORY OF PRESENT ILLNESS:  Brittney Morris is a 49 year old female who is seen in follow up for h/o bilateral mastectomies.    She is s/p bilateral prophylactic skin sparing mastectomies on 10/30/2024.  She had immediate reconstruction with Dr. Paez. She had a history of prior radial scar, ALH and FEA. Her lifetime risk was 42.9%.  She is debating if she should proceed with fat grafting.  She had implants placed earlier this year and tolerated the procedure well.  She is overall happy with her size and cosmesis.  She is nervous about additional procedures.    Past Medical History:   Diagnosis Date    Anxiety     Female stress incontinence     Hx of heartburn     Hyperlipidemia     Leiomyoma of uterus     Missed ab     Seizures (H) 1985    Seizure as a child. Took meds for 2 years.    Syncope     UTI (urinary tract infection)        Past Surgical History:   Procedure Laterality Date    BIOPSY      BIOPSY BREAST SEED LOCALIZATION Right 08/22/2018    Procedure: BIOPSY BREAST SEED LOCALIZATION;  RIGHT SEED LOCALIZED BREAST BIOPSY ;  Surgeon: Naomi Lewis MD;  Location: [facility]    BREAST SURGERY      COLONOSCOPY      DILATION AND CURETTAGE SUCTION  12/03/2012    Procedure: DILATION AND CURETTAGE SUCTION;  SUCTION DILATION AND CURETTAGE;  Surgeon: Nargis Gordon MD;  Location: [facility]    HYSTERECTOMY, PAP NO LONGER INDICATED      cervix/uterus removed, ovaries remain (unsure tubes)    MASTECTOMY SIMPLE Bilateral 10/30/2024    Procedure: bilateral prophylactic mastectomies;  Surgeon: Naomi Lewis MD;  Location:  OR    RECONSTRUCT BREAST, INSERT TISSUE EXPANDER BILATERAL, COMBINED Bilateral 10/30/2024    Procedure: Bilateral First Stage Breast Reconstruction with Tissue Expander Placement; Acellular Dermal Matrix;  Surgeon: Darci Son MD;  Location:  OR    wisdom teeth[    "      Family History   Problem Relation Age of Onset    Hypertension Mother     Breast Cancer Sister 42    Breast Cancer Maternal Aunt 50    Esophageal Cancer Maternal Uncle 70       Social History     Tobacco Use    Smoking status: Never     Passive exposure: Never    Smokeless tobacco: Never   Substance Use Topics    Alcohol use: Yes     Comment: occas       Patient Active Problem List   Diagnosis    Pain in joint involving pelvic region and thigh    Female stress incontinence    Syncope    Dysplastic nevi    Gastroesophageal reflux disease without esophagitis    Leiomyoma of uterus    Neoplasm of uncertain behavior of skin    Vitamin deficiency    KVNG (generalized anxiety disorder)    Hemorrhoid    Hyperlipidemia    Seizure disorder (H)    Conjunctival cyst of both eyes    History of dysplastic nevus    Myopia of both eyes with astigmatism and presbyopia    Superficial punctate keratitis, left    History of heartburn    Breast cancer screening, high risk patient    S/P mastectomy, bilateral     No Known Allergies  Current Outpatient Medications   Medication Sig Dispense Refill    levETIRAcetam (KEPPRA) 500 MG tablet TAKE 1 TABLET BY MOUTH TWICE A DAY       Vitals: /62   Pulse 85   Ht 1.575 m (5' 2\")   Wt 61.2 kg (135 lb)   LMP 08/01/2018 (Approximate)   SpO2 98%   BMI 24.69 kg/m    BMI= Body mass index is 24.69 kg/m .    EXAM:  GENERAL: healthy, alert and no distress   BREAST: Breasts are surgically absent.  Implants are in place bilaterally.  Well-healed transverse mastectomy scars.  There are no palpable masses on either chest wall.  There is no axillary or supraclavicular lymphadenopathy.  CARDIOVASCULAR:  RRR  RESPIRATORY: nonlabored breathing  NECK: Neck supple. No adenopathy. Thyroid symmetric, normal size  SKIN: No suspicious lesions or rashes  LYMPH: Normal cervical lymph nodes      ASSESSMENT/PLAN:  Brittney Morris is a 49-year-old female who is status post bilateral mastectomies with " immediate reconstruction.  She is doing well.  There are no areas of concern on clinical exam.  We discussed she should have a clinical exam annually for screening done with her primary or myself.        10 minutes total time spent on the date of this encounter doing: chart review, review of test results, patient visit, physical exam, education, counseling, developing plan of care, and documenting.      Naomi Lewis MD  [facility]  [phone]        Please route or send letter to:  Primary Care Provider (PCP) and Referring Provider

## 2025-05-20 ENCOUNTER — LAB REQUISITION (OUTPATIENT)
Dept: LAB | Facility: CLINIC | Age: 50
End: 2025-05-20
Payer: COMMERCIAL

## 2025-05-20 DIAGNOSIS — Z13.220 ENCOUNTER FOR SCREENING FOR LIPOID DISORDERS: ICD-10-CM

## 2025-05-20 DIAGNOSIS — R23.2 FLUSHING: ICD-10-CM

## 2025-05-20 DIAGNOSIS — Z13.29 ENCOUNTER FOR SCREENING FOR OTHER SUSPECTED ENDOCRINE DISORDER: ICD-10-CM

## 2025-05-20 DIAGNOSIS — Z13.1 ENCOUNTER FOR SCREENING FOR DIABETES MELLITUS: ICD-10-CM

## 2025-05-20 LAB
CHOLEST SERPL-MCNC: 199 MG/DL
EST. AVERAGE GLUCOSE BLD GHB EST-MCNC: 103 MG/DL
FASTING STATUS PATIENT QL REPORTED: YES
FASTING STATUS PATIENT QL REPORTED: YES
FSH SERPL IRP2-ACNC: 13.4 MIU/ML
GLUCOSE SERPL-MCNC: 87 MG/DL (ref 70–99)
HBA1C MFR BLD: 5.2 %
HDLC SERPL-MCNC: 82 MG/DL
LDLC SERPL CALC-MCNC: 101 MG/DL
NONHDLC SERPL-MCNC: 117 MG/DL
T4 FREE SERPL-MCNC: 1.19 NG/DL (ref 0.9–1.7)
TRIGL SERPL-MCNC: 79 MG/DL
TSH SERPL DL<=0.005 MIU/L-ACNC: 5.58 UIU/ML (ref 0.3–4.2)

## 2025-07-15 ENCOUNTER — TRANSFERRED RECORDS (OUTPATIENT)
Dept: HEALTH INFORMATION MANAGEMENT | Facility: CLINIC | Age: 50
End: 2025-07-15
Payer: COMMERCIAL

## (undated) DEVICE — DRAPE IOBAN INCISE 23X17" 6650EZ

## (undated) DEVICE — GLOVE BIOGEL PI MICRO INDICATOR UNDERGLOVE SZ 6.5 48965

## (undated) DEVICE — GLOVE BIOGEL PI MICRO SZ 6.0 48560

## (undated) DEVICE — KIT PROCEDURE SPY-PHI SGL HH9001

## (undated) DEVICE — LINEN TOWEL PACK X5 5464

## (undated) DEVICE — ESU PENCIL W/SMOKE EVAC NEPTUNE STRYKER 0703-046-000

## (undated) DEVICE — SU TIE VICRYL+ 3-0 12X18IN VIO VCP104G

## (undated) DEVICE — SOL NACL 0.9% INJ 1000ML BAG 2B1324X

## (undated) DEVICE — CLEANSER JET LAVAGE IRRISEPT 0.05% CHG IRRISEPT45USA

## (undated) DEVICE — ESU ELEC BLADE 2.75" COATED/INSULATED E1455

## (undated) DEVICE — DRSG KERLIX 4 1/2"X4YDS ROLL 6715

## (undated) DEVICE — PAD CHUX UNDERPAD 23X24" 7136

## (undated) DEVICE — SU VICRYL 2-0 SH 27" J317H

## (undated) DEVICE — GLOVE BIOGEL PI SZ 7.5 40875

## (undated) DEVICE — DRSG BIOPATCH GERMICIDAL SPLIT SPONGE 7MM LG

## (undated) DEVICE — SYR 50ML LL W/O NDL 309653

## (undated) DEVICE — SU VICRYL 3-0 SH 27" J316H

## (undated) DEVICE — DRSG STERI STRIP 1/2X4" R1547

## (undated) DEVICE — KIT SPY ELITE DISP KIT W/DRAPE SGL PACK LC3001

## (undated) DEVICE — DRSG BIOPATCH GERMICIDAL SPLIT SPONGE 4MM MED 4150

## (undated) DEVICE — SU SILK 2-0 FSL 18" 677G

## (undated) DEVICE — SLEEVE PROTECTIVE BREAST BIOPSY  GMSLV001-10

## (undated) DEVICE — PACK MINOR SBA15MIFSE

## (undated) DEVICE — SU VICRYL 4-0 PS-2 18" UND J496H

## (undated) DEVICE — DRSG STERI STRIP 1/4X3" R1541

## (undated) DEVICE — SUCTION CANISTER MEDIVAC LINER 3000ML W/LID 65651-530

## (undated) DEVICE — SOL WATER IRRIG 1000ML BOTTLE 2F7114

## (undated) DEVICE — GLOVE PROTEXIS BLUE W/NEU-THERA 6.5  2D73EB65

## (undated) DEVICE — GLOVE PROTEXIS MICRO 6.0  2D73PM60

## (undated) DEVICE — PREP CHLORAPREP 26ML TINTED ORANGE  260815

## (undated) DEVICE — SOLUTION WOUND CLEANSING 3/4OZ 10% PVP EA-L3011FB-50

## (undated) DEVICE — DRAIN JACKSON PRATT RESERVOIR 100ML SU130-1305

## (undated) DEVICE — DRSG TEGADERM 4X4 3/4" 1626

## (undated) DEVICE — ESU GROUND PAD UNIVERSAL W/O CORD

## (undated) DEVICE — TUBING INFUSION INFILTRATION LIPOSUCTION 156" 24-6008

## (undated) DEVICE — SU SILK 2-0 SH CR 8X18" C012D

## (undated) DEVICE — DRAPE BREAST/CHEST 29420

## (undated) DEVICE — SU MONOCRYL 4-0 PS-2 18" UND Y496G

## (undated) DEVICE — PACK MAJOR SBA15MAFSI

## (undated) DEVICE — DECANTER BAG 2002S

## (undated) DEVICE — SU MONOCRYL 3-0 PS-2 27" Y427H

## (undated) DEVICE — DRAIN JACKSON PRATT 15FR ROUND SIL LF JP-2229

## (undated) DEVICE — BNDG ELASTIC 6" DBL LENGTH UNSTERILE 6611-16

## (undated) DEVICE — PREP CHLORAPREP 26ML TINTED HI-LITE ORANGE 930815

## (undated) DEVICE — SPONGE LAP 18X18" X8435

## (undated) RX ORDER — FENTANYL CITRATE 50 UG/ML
INJECTION, SOLUTION INTRAMUSCULAR; INTRAVENOUS
Status: DISPENSED
Start: 2024-10-30

## (undated) RX ORDER — APREPITANT 40 MG/1
CAPSULE ORAL
Status: DISPENSED
Start: 2024-10-30

## (undated) RX ORDER — PROPOFOL 10 MG/ML
INJECTION, EMULSION INTRAVENOUS
Status: DISPENSED
Start: 2018-08-22

## (undated) RX ORDER — HYDROCODONE BITARTRATE AND ACETAMINOPHEN 5; 325 MG/1; MG/1
TABLET ORAL
Status: DISPENSED
Start: 2018-08-22

## (undated) RX ORDER — CEFAZOLIN SODIUM 1 G/3ML
INJECTION, POWDER, FOR SOLUTION INTRAMUSCULAR; INTRAVENOUS
Status: DISPENSED
Start: 2018-08-22

## (undated) RX ORDER — CELECOXIB 200 MG/1
CAPSULE ORAL
Status: DISPENSED
Start: 2024-10-30

## (undated) RX ORDER — PROPOFOL 10 MG/ML
INJECTION, EMULSION INTRAVENOUS
Status: DISPENSED
Start: 2024-10-30

## (undated) RX ORDER — HYDROMORPHONE HYDROCHLORIDE 1 MG/ML
INJECTION, SOLUTION INTRAMUSCULAR; INTRAVENOUS; SUBCUTANEOUS
Status: DISPENSED
Start: 2024-10-30

## (undated) RX ORDER — FENTANYL CITRATE 0.05 MG/ML
INJECTION, SOLUTION INTRAMUSCULAR; INTRAVENOUS
Status: DISPENSED
Start: 2024-10-30

## (undated) RX ORDER — GABAPENTIN 300 MG/1
CAPSULE ORAL
Status: DISPENSED
Start: 2024-10-30

## (undated) RX ORDER — FENTANYL CITRATE 50 UG/ML
INJECTION, SOLUTION INTRAMUSCULAR; INTRAVENOUS
Status: DISPENSED
Start: 2018-08-22